# Patient Record
Sex: MALE | Race: WHITE | NOT HISPANIC OR LATINO | Employment: OTHER | URBAN - METROPOLITAN AREA
[De-identification: names, ages, dates, MRNs, and addresses within clinical notes are randomized per-mention and may not be internally consistent; named-entity substitution may affect disease eponyms.]

---

## 2018-02-14 ENCOUNTER — OFFICE VISIT (OUTPATIENT)
Dept: PAIN MEDICINE | Facility: CLINIC | Age: 83
End: 2018-02-14
Payer: MEDICARE

## 2018-02-14 VITALS
BODY MASS INDEX: 29.84 KG/M2 | WEIGHT: 240 LBS | HEART RATE: 89 BPM | SYSTOLIC BLOOD PRESSURE: 136 MMHG | HEIGHT: 75 IN | DIASTOLIC BLOOD PRESSURE: 70 MMHG

## 2018-02-14 DIAGNOSIS — M47.896 OTHER SPONDYLOSIS, LUMBAR REGION: ICD-10-CM

## 2018-02-14 DIAGNOSIS — M51.36 DDD (DEGENERATIVE DISC DISEASE), LUMBAR: Primary | ICD-10-CM

## 2018-02-14 PROCEDURE — 99203 OFFICE O/P NEW LOW 30 MIN: CPT | Mod: PBBFAC,PO | Performed by: ANESTHESIOLOGY

## 2018-02-14 PROCEDURE — 99999 PR PBB SHADOW E&M-NEW PATIENT-LVL III: CPT | Mod: PBBFAC,,, | Performed by: ANESTHESIOLOGY

## 2018-02-14 PROCEDURE — 1159F MED LIST DOCD IN RCRD: CPT | Mod: ,,, | Performed by: ANESTHESIOLOGY

## 2018-02-14 PROCEDURE — 1125F AMNT PAIN NOTED PAIN PRSNT: CPT | Mod: ,,, | Performed by: ANESTHESIOLOGY

## 2018-02-14 PROCEDURE — 99204 OFFICE O/P NEW MOD 45 MIN: CPT | Mod: S$PBB,,, | Performed by: ANESTHESIOLOGY

## 2018-02-14 RX ORDER — SITAGLIPTIN 100 MG/1
100 TABLET, FILM COATED ORAL DAILY
COMMUNITY
Start: 2017-11-28

## 2018-02-14 RX ORDER — HYDROCODONE BITARTRATE AND ACETAMINOPHEN 5; 325 MG/1; MG/1
TABLET ORAL
Status: ON HOLD | COMMUNITY
Start: 2017-12-13 | End: 2018-03-05

## 2018-02-14 RX ORDER — PANTOPRAZOLE SODIUM 40 MG/1
TABLET, DELAYED RELEASE ORAL
COMMUNITY
Start: 2017-12-11 | End: 2019-03-12 | Stop reason: SDUPTHER

## 2018-02-14 RX ORDER — LISINOPRIL 2.5 MG/1
2.5 TABLET ORAL DAILY
COMMUNITY
Start: 2018-01-08 | End: 2019-04-09 | Stop reason: SDUPTHER

## 2018-02-14 RX ORDER — RIVAROXABAN 20 MG/1
TABLET, FILM COATED ORAL
COMMUNITY
Start: 2018-01-17 | End: 2018-04-17 | Stop reason: SDUPTHER

## 2018-02-14 RX ORDER — COLLAGENASE SANTYL 250 [ARB'U]/G
OINTMENT TOPICAL
COMMUNITY
Start: 2017-12-11 | End: 2020-02-26

## 2018-02-14 NOTE — PROGRESS NOTES
This note was completed with dictation software and grammatical errors may exist.    Referring Physician: Navdeep, Philipreferral    PCP: Dave Enriquez MD      CC:  Low back pain    HPI:   Ric Guzmán is a 82 y.o. male with PMHx DM2, clotting disorder on NOAC, HTN, s/p x5 CAIRA for back pain in 2012, SCS placement in 2015 with subsequent removal in 2016 presents with c/o lower back/upper buttock pain.  He states it has been present for many years.  It does not radiate.  It is constant,  It is described as an aching.  Exacerbating factors are standing, alleviating factors are rest.  It is constant.  No bowel or bladder incontinence.  No other associated symptoms. He has not had any lumbar medial branch nerve blocks.        ROS:  CONSTITUTIONAL: No fevers, chills, night sweats, wt. loss, appetite changes  SKIN: no rashes or itching  ENT: No headaches, head trauma, vision changes, or eye pain  LYMPH NODES: None noticed   CV: No chest pain, palpitations.   RESP: No shortness of breath, dyspnea on exertion, cough, wheezing, or hemoptysis  GI: No nausea, emesis, diarrhea, constipation, melena, hematochezia, pain.    : No dysuria, hematuria, urgency, or frequency   HEME: No easy bruising, bleeding problems  PSYCHIATRIC: No depression, anxiety, psychosis, hallucinations.  NEURO: No seizures, memory loss, dizziness or difficulty sleeping  MSK: +HPI      Past Medical History:   Diagnosis Date    ADHD (attention deficit hyperactivity disorder)     Allergy     Arthritis     Cancer     Cataract     Clotting disorder     Diabetes mellitus, type 2     Ulcer      Past Surgical History:   Procedure Laterality Date    CHOLECYSTECTOMY      COLON SURGERY      EYE SURGERY      FRACTURE SURGERY      HERNIA REPAIR      JOINT REPLACEMENT      SPINE SURGERY      TONSILLECTOMY      VASECTOMY       No family history on file.  Social History     Social History    Marital status:      Spouse name: N/A    Number of  "children: N/A    Years of education: N/A     Social History Main Topics    Smoking status: Former Smoker    Smokeless tobacco: Former User    Alcohol use 2.4 oz/week     4 Cans of beer per week    Drug use: No    Sexual activity: No     Other Topics Concern    None     Social History Narrative    None         Medications/Allergies: See med card    Vitals:    02/14/18 1403   BP: 136/70   Pulse: 89   Weight: 108.9 kg (240 lb)   Height: 6' 3" (1.905 m)   PainSc:   8   PainLoc: Back         Physical exam:    GENERAL: A and O x3, the patient appears well groomed and is in no acute distress.  Skin: No rashes or obvious lesions  HEENT: normocephalic, atraumatic  CARDIOVASCULAR:  Palpable peripheral pulses  LUNGS: easy work of breathing  ABDOMEN: soft, nontender   UPPER EXTREMITIES: Normal alignment, normal range of motion, no atrophy, no skin changes,  hair growth and nail growth normal and equal bilaterally. No swelling, no tenderness.    LOWER EXTREMITIES:  Normal alignment, normal range of motion, no atrophy, no skin changes,  hair growth and nail growth normal and equal bilaterally. No swelling, no tenderness.    LUMBAR SPINE  Lumbar spine: ROM is full with flexion extension and oblique extension with no increased pain.  + Mild increase in pain bilaterally with axial loading.  Jalen's test causes no increased pain on either side.    Supine straight leg raise is negative bilaterally.    Internal and external rotation of the hip causes no increased pain on either side.  Myofascial exam: No tenderness to palpation across lumbar paraspinous muscles.      MENTAL STATUS: normal orientation, speech, language, and fund of knowledge for social situation.  Emotional state appropriate.    CRANIAL NERVES:  II:  PERRL bilaterally,   III,IV,VI: EOMI.    V:  Facial sensation equal bilaterally  VII:  Facial motor function normal.  VIII:  Hearing equal to finger rub bilaterally  IX/X: Gag normal, palate symmetric  XI: "  Shoulder shrug equal, head turn equal  XII:  Tongue midline without fasciculations      MOTOR: Tone and bulk: normal bilateral upper and lower Strength: normal   Delt Bi Tri WE WF     R 5 5 5 5 5 5   L 5 5 5 5 5 5     IP ADD ABD Quad TA Gas HAM  R 5 5 5 5 5 5 5  L 5 5 5 5 5 5 5    SENSATION: Light touch and pinprick intact bilaterally  REFLEXES: normal, symmetric, nonbrisk.  Toes down, no clonus. No hoffmans.  GAIT: normal rise, base, steps, and arm swing.        Imaging:  MRI 6.2016  Mild ligamentum flavum hypertrophy noted at L3, L5.  Mild facet arthropathy L4-5.      Assessment:  Patient referred for lower back pain  1. Facet arthropathy, lumbar     2. DDD (degenerative disc disease), lumbar     3. Lumbar spondylosis           Plan:  - I have stressed the importance of physical activity and exercise to improve overall health  - Request records to review  - I believe his low back pain maybe due to facet arthropathy and have recommended lumbar medial branch blocks as a diagnostic procedure.  If successful, would proceed with radiofrequency ablation.  - Follow up after procedure

## 2018-02-19 ENCOUNTER — TELEPHONE (OUTPATIENT)
Dept: PAIN MEDICINE | Facility: CLINIC | Age: 83
End: 2018-02-19

## 2018-02-19 NOTE — TELEPHONE ENCOUNTER
----- Message from Cathleen Carreon sent at 2/19/2018 12:34 PM CST -----  Contact: pt  Pt states that he received a call today thinks from the office and he is returning to see the reason for the call...[phone]

## 2018-02-26 DIAGNOSIS — M48.8X6 OTHER SPECIFIED SPONDYLOPATHIES, LUMBAR REGION: Primary | ICD-10-CM

## 2018-02-27 ENCOUNTER — DOCUMENTATION ONLY (OUTPATIENT)
Dept: PAIN MEDICINE | Facility: CLINIC | Age: 83
End: 2018-02-27

## 2018-02-27 NOTE — PROGRESS NOTES
MRI lumbar spine without contrast 2/2/2018 ([facility])  Impression:  Slight multilevel spondylosis involving slight bilateral foraminal narrowing at L4-5 due to facet arthropathy.  No central canal stenosis.

## 2018-03-05 ENCOUNTER — HOSPITAL ENCOUNTER (OUTPATIENT)
Facility: AMBULARY SURGERY CENTER | Age: 83
Discharge: HOME | End: 2018-03-05
Attending: ANESTHESIOLOGY | Admitting: ANESTHESIOLOGY
Payer: MEDICARE

## 2018-03-05 ENCOUNTER — SURGERY (OUTPATIENT)
Age: 83
End: 2018-03-05

## 2018-03-05 DIAGNOSIS — M47.896 OTHER SPONDYLOSIS, LUMBAR REGION: Primary | ICD-10-CM

## 2018-03-05 LAB — POCT GLUCOSE: 252 MG/DL (ref 70–110)

## 2018-03-05 PROCEDURE — 64494 INJ PARAVERT F JNT L/S 2 LEV: CPT | Mod: 50,,, | Performed by: ANESTHESIOLOGY

## 2018-03-05 PROCEDURE — 64493 INJ PARAVERT F JNT L/S 1 LEV: CPT | Mod: RT | Performed by: ANESTHESIOLOGY

## 2018-03-05 PROCEDURE — 64494 INJ PARAVERT F JNT L/S 2 LEV: CPT | Mod: LT | Performed by: ANESTHESIOLOGY

## 2018-03-05 PROCEDURE — 99152 MOD SED SAME PHYS/QHP 5/>YRS: CPT | Mod: ,,, | Performed by: ANESTHESIOLOGY

## 2018-03-05 PROCEDURE — 64493 INJ PARAVERT F JNT L/S 1 LEV: CPT | Mod: 50,,, | Performed by: ANESTHESIOLOGY

## 2018-03-05 PROCEDURE — 64495 INJ PARAVERT F JNT L/S 3 LEV: CPT | Mod: RT | Performed by: ANESTHESIOLOGY

## 2018-03-05 PROCEDURE — G8907 PT DOC NO EVENTS ON DISCHARG: HCPCS | Performed by: ANESTHESIOLOGY

## 2018-03-05 RX ORDER — BUPIVACAINE HYDROCHLORIDE 5 MG/ML
INJECTION, SOLUTION EPIDURAL; INTRACAUDAL
Status: DISCONTINUED | OUTPATIENT
Start: 2018-03-05 | End: 2018-03-05 | Stop reason: HOSPADM

## 2018-03-05 RX ORDER — BUPIVACAINE HYDROCHLORIDE 5 MG/ML
INJECTION, SOLUTION EPIDURAL; INTRACAUDAL
Status: DISCONTINUED
Start: 2018-03-05 | End: 2018-03-05 | Stop reason: HOSPADM

## 2018-03-05 RX ORDER — SODIUM CHLORIDE, SODIUM LACTATE, POTASSIUM CHLORIDE, CALCIUM CHLORIDE 600; 310; 30; 20 MG/100ML; MG/100ML; MG/100ML; MG/100ML
INJECTION, SOLUTION INTRAVENOUS ONCE AS NEEDED
Status: COMPLETED | OUTPATIENT
Start: 2018-03-05 | End: 2018-03-05

## 2018-03-05 RX ORDER — MIDAZOLAM HYDROCHLORIDE 2 MG/2ML
INJECTION, SOLUTION INTRAMUSCULAR; INTRAVENOUS
Status: DISCONTINUED | OUTPATIENT
Start: 2018-03-05 | End: 2018-03-05 | Stop reason: HOSPADM

## 2018-03-05 RX ORDER — MIDAZOLAM HYDROCHLORIDE 1 MG/ML
INJECTION INTRAMUSCULAR; INTRAVENOUS
Status: DISCONTINUED
Start: 2018-03-05 | End: 2018-03-05 | Stop reason: HOSPADM

## 2018-03-05 RX ADMIN — BUPIVACAINE HYDROCHLORIDE 5 ML: 5 INJECTION, SOLUTION EPIDURAL; INTRACAUDAL at 12:03

## 2018-03-05 RX ADMIN — SODIUM CHLORIDE, SODIUM LACTATE, POTASSIUM CHLORIDE, CALCIUM CHLORIDE: 600; 310; 30; 20 INJECTION, SOLUTION INTRAVENOUS at 12:03

## 2018-03-05 RX ADMIN — MIDAZOLAM HYDROCHLORIDE 2 MG: 2 INJECTION, SOLUTION INTRAMUSCULAR; INTRAVENOUS at 12:03

## 2018-03-05 NOTE — OP NOTE
PROCEDURE DATE: 3/5/2018    PROCEDURE:  Bilateral L3,4,5 medial branch nerve blocks    DIAGNOSIS:  Other lumbar spondylosis    Post Op diagnosis: Same    PHYSICIAN: Joe Matos MD    MEDICATIONS INJECTED: 0.5% bupivicaine, 0.5 ml at each level    SEDATION MEDICATIONS:IV Versed    LOCAL ANESTHETIC USED: None    ESTIMATED BLOOD LOSS:  None    COMPLICATIONS:  None    TECHNIQUE: A time out was taken to identify the patient, procedure and side of the procedure. The patient was placed in a prone position, then prepped and draped in the usual sterile fashion using ChloraPrep and sterile towels.  The levels were determined under fluoroscopic guidance and then marked.  A 25-gauge 3.5 inch needle was introduced to the anatomic location of the L3,4,5 medial branch nerves on the bilateral side. The above medication was then injected. The patient tolerated the procedure well.     The patient was monitored after the procedure. Patient was given pain diary to record pain levels at home.     If found to have greater than a 50% recovery and so will be scheduled for a radiofrequency ablation of the corresponding nerves.  Patient was given post procedure and discharge instructions to follow at home.  The patient was discharged in a stable condition.

## 2018-03-05 NOTE — DISCHARGE SUMMARY
[facility]  Discharge Note  Short Stay    Admit Date: 3/5/2018    Discharge Date and Time: 3/5/2018    Attending Physician: Joe Matos MD     Discharge Provider: Joe Matos    Diagnoses:  Active Hospital Problems    Diagnosis  POA    *Other spondylosis, lumbar region [M47.896]  Yes      Resolved Hospital Problems    Diagnosis Date Resolved POA   No resolved problems to display.       Hospital Course: Lumbar MBB  Discharged Condition: Good    Final Diagnoses:   Active Hospital Problems    Diagnosis  POA    *Other spondylosis, lumbar region [M47.896]  Yes      Resolved Hospital Problems    Diagnosis Date Resolved POA   No resolved problems to display.       Disposition: Home or Self Care    Follow up/Patient Instructions:    Medications:  Reconciled Home Medications:   Current Discharge Medication List      CONTINUE these medications which have NOT CHANGED    Details   lisinopril (PRINIVIL,ZESTRIL) 2.5 MG tablet       JANUVIA 100 mg Tab       pantoprazole (PROTONIX) 40 MG tablet       SANTYL ointment       XARELTO 20 mg Tab          STOP taking these medications       hydrocodone-acetaminophen 5-325mg (NORCO) 5-325 mg per tablet Comments:   Reason for Stopping:               Discharge Procedure Orders  Call MD for:  temperature >100.4     Call MD for:  persistent nausea and vomiting or diarrhea     Call MD for:  severe uncontrolled pain     Call MD for:  redness, tenderness, or signs of infection (pain, swelling, redness, odor or green/yellow discharge around incision site)     Call MD for:  difficulty breathing or increased cough     Call MD for:  severe persistent headache          Follow up with MD in 2-3 weeks    Discharge Procedure Orders (must include Diet, Follow-up, Activity):    Discharge Procedure Orders (must include Diet, Follow-up, Activity)  Call MD for:  temperature >100.4     Call MD for:  persistent nausea and vomiting or diarrhea     Call MD for:  severe uncontrolled pain     Call MD  for:  redness, tenderness, or signs of infection (pain, swelling, redness, odor or green/yellow discharge around incision site)     Call MD for:  difficulty breathing or increased cough     Call MD for:  severe persistent headache

## 2018-03-05 NOTE — PLAN OF CARE
Patient sitting up in chair and states she is ready to go home. Spouse chairside and states she is ready to take patient home; she states she is driving  home. Patient denies nausea, pain or any limb weakness. Patient recorded pain assessment on pain diary; spouse and patient able to teach back all discharge instructions including pain diary and recording and usage and precautions of ice pack(given) for as needed use.

## 2018-03-05 NOTE — DISCHARGE INSTRUCTIONS
Recovery After Procedural Sedation (Adult)  You have been given medicine by vein to make you sleep during your surgery. This may have included both a pain medicine and sleeping medicine. Most of the effects have worn off. But you may still have some drowsiness for the next 6 to 8 hours.  Home care  Follow these guidelines when you get home:  · For the next 8 hours, you should be watched by a responsible adult. This person should make sure your condition is not getting worse.  · Don't drink any alcohol for the next 24 hours.  · Don't drive, operate dangerous machinery, or make important business or personal decisions during the next 24 hours.  Note: Your healthcare provider may tell you not to take any medicine by mouth for pain or sleep in the next 4 hours. These medicines may react with the medicines you were given in the hospital. This could cause a much stronger response than usual.  Follow-up care  Follow up with your healthcare provider if you are not alert and back to your usual level of activity within 12 hours.  When to seek medical advice  Call your healthcare provider right away if any of these occur:  · Drowsiness gets worse  · Weakness or dizziness gets worse  · Repeated vomiting  · You can't be awakened   Date Last Reviewed: 10/18/2016  © 1249-8086 [employer]. [address] [place] [address], [place] [address]. All rights reserved. This information is not intended as a substitute for professional medical care. Always follow your healthcare professional's instructions.      Nerve Block  This was a test, not a treatment. Your pain may return.  Keep your pain journal Dr office will call to check your pain levels within 3 days    Home care instructions  You may apply ice pack to the injection site for 2 days , NO HEAT for 2 days. No hot tubs, swimming pools saunas, or heating pads for 2 days.  You may take a shower but no soaking above level of injection in tub or pool for 2 days  Resume Aspirin,  Plavix, or Coumadin the day after the procedure unless otherwise instructed.  Gradually increase activity  Do not drive for 24 hr  If diabetic monitor your glucose carefully. Follow up with your primary MD if this is a problem    SEEK  IMMEDIATE MEDICAL HELP FOR:  Severe increase in your usual pain or appearance of new pain  Prolonged or increasing weakness or numbness in the legs or arms  Drainage, redness, active bleeding, or increased swelling at the injection site  Temperature over 100.0 degrees F.  Headache that increases when your head is upright and decreases when you lie flat  Shortness of breath, chest pain, or problems breathing

## 2018-03-05 NOTE — H&P (VIEW-ONLY)
This note was completed with dictation software and grammatical errors may exist.    Referring Physician: Navdeep, Philipreferral    PCP: Dave Enriquez MD      CC:  Low back pain    HPI:   Ric Guzmán is a 82 y.o. male with PMHx DM2, clotting disorder on NOAC, HTN, s/p x5 CIARA for back pain in 2012, SCS placement in 2015 with subsequent removal in 2016 presents with c/o lower back/upper buttock pain.  He states it has been present for many years.  It does not radiate.  It is constant,  It is described as an aching.  Exacerbating factors are standing, alleviating factors are rest.  It is constant.  No bowel or bladder incontinence.  No other associated symptoms. He has not had any lumbar medial branch nerve blocks.        ROS:  CONSTITUTIONAL: No fevers, chills, night sweats, wt. loss, appetite changes  SKIN: no rashes or itching  ENT: No headaches, head trauma, vision changes, or eye pain  LYMPH NODES: None noticed   CV: No chest pain, palpitations.   RESP: No shortness of breath, dyspnea on exertion, cough, wheezing, or hemoptysis  GI: No nausea, emesis, diarrhea, constipation, melena, hematochezia, pain.    : No dysuria, hematuria, urgency, or frequency   HEME: No easy bruising, bleeding problems  PSYCHIATRIC: No depression, anxiety, psychosis, hallucinations.  NEURO: No seizures, memory loss, dizziness or difficulty sleeping  MSK: +HPI      Past Medical History:   Diagnosis Date    ADHD (attention deficit hyperactivity disorder)     Allergy     Arthritis     Cancer     Cataract     Clotting disorder     Diabetes mellitus, type 2     Ulcer      Past Surgical History:   Procedure Laterality Date    CHOLECYSTECTOMY      COLON SURGERY      EYE SURGERY      FRACTURE SURGERY      HERNIA REPAIR      JOINT REPLACEMENT      SPINE SURGERY      TONSILLECTOMY      VASECTOMY       No family history on file.  Social History     Social History    Marital status:      Spouse name: N/A    Number of  "children: N/A    Years of education: N/A     Social History Main Topics    Smoking status: Former Smoker    Smokeless tobacco: Former User    Alcohol use 2.4 oz/week     4 Cans of beer per week    Drug use: No    Sexual activity: No     Other Topics Concern    None     Social History Narrative    None         Medications/Allergies: See med card    Vitals:    02/14/18 1403   BP: 136/70   Pulse: 89   Weight: 108.9 kg (240 lb)   Height: 6' 3" (1.905 m)   PainSc:   8   PainLoc: Back         Physical exam:    GENERAL: A and O x3, the patient appears well groomed and is in no acute distress.  Skin: No rashes or obvious lesions  HEENT: normocephalic, atraumatic  CARDIOVASCULAR:  Palpable peripheral pulses  LUNGS: easy work of breathing  ABDOMEN: soft, nontender   UPPER EXTREMITIES: Normal alignment, normal range of motion, no atrophy, no skin changes,  hair growth and nail growth normal and equal bilaterally. No swelling, no tenderness.    LOWER EXTREMITIES:  Normal alignment, normal range of motion, no atrophy, no skin changes,  hair growth and nail growth normal and equal bilaterally. No swelling, no tenderness.    LUMBAR SPINE  Lumbar spine: ROM is full with flexion extension and oblique extension with no increased pain.  + Mild increase in pain bilaterally with axial loading.  Jalen's test causes no increased pain on either side.    Supine straight leg raise is negative bilaterally.    Internal and external rotation of the hip causes no increased pain on either side.  Myofascial exam: No tenderness to palpation across lumbar paraspinous muscles.      MENTAL STATUS: normal orientation, speech, language, and fund of knowledge for social situation.  Emotional state appropriate.    CRANIAL NERVES:  II:  PERRL bilaterally,   III,IV,VI: EOMI.    V:  Facial sensation equal bilaterally  VII:  Facial motor function normal.  VIII:  Hearing equal to finger rub bilaterally  IX/X: Gag normal, palate symmetric  XI: "  Shoulder shrug equal, head turn equal  XII:  Tongue midline without fasciculations      MOTOR: Tone and bulk: normal bilateral upper and lower Strength: normal   Delt Bi Tri WE WF     R 5 5 5 5 5 5   L 5 5 5 5 5 5     IP ADD ABD Quad TA Gas HAM  R 5 5 5 5 5 5 5  L 5 5 5 5 5 5 5    SENSATION: Light touch and pinprick intact bilaterally  REFLEXES: normal, symmetric, nonbrisk.  Toes down, no clonus. No hoffmans.  GAIT: normal rise, base, steps, and arm swing.        Imaging:  MRI 6.2016  Mild ligamentum flavum hypertrophy noted at L3, L5.  Mild facet arthropathy L4-5.      Assessment:  Patient referred for lower back pain  1. Facet arthropathy, lumbar     2. DDD (degenerative disc disease), lumbar     3. Lumbar spondylosis           Plan:  - I have stressed the importance of physical activity and exercise to improve overall health  - Request records to review  - I believe his low back pain maybe due to facet arthropathy and have recommended lumbar medial branch blocks as a diagnostic procedure.  If successful, would proceed with radiofrequency ablation.  - Follow up after procedure

## 2018-03-06 VITALS
DIASTOLIC BLOOD PRESSURE: 76 MMHG | SYSTOLIC BLOOD PRESSURE: 165 MMHG | HEART RATE: 79 BPM | BODY MASS INDEX: 29.85 KG/M2 | RESPIRATION RATE: 18 BRPM | OXYGEN SATURATION: 97 % | WEIGHT: 240.06 LBS | HEIGHT: 75 IN | TEMPERATURE: 98 F

## 2018-03-08 ENCOUNTER — TELEPHONE (OUTPATIENT)
Dept: PAIN MEDICINE | Facility: CLINIC | Age: 83
End: 2018-03-08

## 2018-03-09 ENCOUNTER — OFFICE VISIT (OUTPATIENT)
Dept: PAIN MEDICINE | Facility: CLINIC | Age: 83
End: 2018-03-09
Payer: MEDICARE

## 2018-03-09 VITALS
HEIGHT: 75 IN | BODY MASS INDEX: 29.84 KG/M2 | DIASTOLIC BLOOD PRESSURE: 79 MMHG | WEIGHT: 240 LBS | SYSTOLIC BLOOD PRESSURE: 166 MMHG | HEART RATE: 86 BPM

## 2018-03-09 DIAGNOSIS — M51.36 DDD (DEGENERATIVE DISC DISEASE), LUMBAR: ICD-10-CM

## 2018-03-09 DIAGNOSIS — M47.819 FACET ARTHROPATHY: Primary | ICD-10-CM

## 2018-03-09 PROCEDURE — 99213 OFFICE O/P EST LOW 20 MIN: CPT | Mod: PBBFAC,PO | Performed by: PHYSICIAN ASSISTANT

## 2018-03-09 PROCEDURE — 99214 OFFICE O/P EST MOD 30 MIN: CPT | Mod: S$PBB,,, | Performed by: PHYSICIAN ASSISTANT

## 2018-03-09 PROCEDURE — 99999 PR PBB SHADOW E&M-EST. PATIENT-LVL III: CPT | Mod: PBBFAC,,, | Performed by: PHYSICIAN ASSISTANT

## 2018-03-09 NOTE — PROGRESS NOTES
Referring Physician: No ref. provider found    PCP: Dave Enriquez MD      CC:  Low back pain    Interval History:  Mr. Guzmán is a 82 y.o. male with chornic low back pain who presents today with low back pain that extends into upper buttock. Pain does not radiate. It is worse with walking. He is s/p lumbar MBB at L3, 4, 5 bilaterally that provided 50% relief of his pain. He says he was able to walk twice as far and his friends noticed he was standing straighter and walking better. He lives in [place]. 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  Pain today is rated 8/10.  Pt has been seen in the clinic before, however pt is new to me.     History below per Dr. Matos    HPI:   Ric Guzmán is a 82 y.o. male with PMHx DM2, clotting disorder on NOAC, HTN, s/p x5 CIARA for back pain in 2012, SCS placement in 2015 with subsequent removal in 2016 presents with c/o lower back/upper buttock pain.  He states it has been present for many years.  It does not radiate.  It is constant,  It is described as an aching.  Exacerbating factors are standing, alleviating factors are rest.  It is constant.  No bowel or bladder incontinence.  No other associated symptoms. He has not had any lumbar medial branch nerve blocks.      Interventional History:  Lumbar MBB at L3, ,4,5 bilaterally 50% relief 3/5/18    ROS:  CONSTITUTIONAL: No fevers, chills, night sweats, wt. loss, appetite changes  SKIN: no rashes or itching  ENT: No headaches, head trauma, vision changes, or eye pain  LYMPH NODES: None noticed   CV: No chest pain, palpitations.   RESP: No shortness of breath, dyspnea on exertion, cough, wheezing, or hemoptysis  GI: No nausea, emesis, diarrhea, constipation, melena, hematochezia, pain.    : No dysuria, hematuria, urgency, or frequency   HEME: No easy bruising, bleeding problems  PSYCHIATRIC: No depression, anxiety, psychosis, hallucinations.  NEURO: No seizures, memory loss, dizziness or difficulty  "sleeping  MSK: +HPI      Past Medical History:   Diagnosis Date    ADHD (attention deficit hyperactivity disorder)     Allergy     Arthritis     Cancer     Cataract     Clotting disorder     Diabetes mellitus, type 2     Ulcer      Past Surgical History:   Procedure Laterality Date    CHOLECYSTECTOMY      COLON SURGERY      EYE SURGERY      FRACTURE SURGERY      HERNIA REPAIR      JOINT REPLACEMENT      SPINE SURGERY      TONSILLECTOMY      VASECTOMY       History reviewed. No pertinent family history.  Social History     Social History    Marital status:      Spouse name: N/A    Number of children: N/A    Years of education: N/A     Social History Main Topics    Smoking status: Former Smoker    Smokeless tobacco: Former User    Alcohol use 2.4 oz/week     4 Cans of beer per week    Drug use: No    Sexual activity: No     Other Topics Concern    None     Social History Narrative    None         Medications/Allergies: See med card    Vitals:    03/09/18 0901   BP: (!) 166/79   Pulse: 86   Weight: 108.9 kg (240 lb)   Height: 6' 3" (1.905 m)   PainSc:   8   PainLoc: Back         Physical exam:    GENERAL: A and O x3, the patient appears well groomed and is in no acute distress.  Skin: No rashes or obvious lesions  HEENT: normocephalic, atraumatic  CARDIOVASCULAR:  RRR  LUNGS: non labored breathing  ABDOMEN: soft, nontender   UPPER EXTREMITIES: Normal alignment, normal range of motion, no atrophy, no skin changes,  hair growth and nail growth normal and equal bilaterally. No swelling, no tenderness.    LOWER EXTREMITIES:  Normal alignment, normal range of motion, no atrophy, no skin changes,  hair growth and nail growth normal and equal bilaterally. No swelling, no tenderness.    LUMBAR SPINE  Lumbar spine: ROM is full with flexion extension and oblique extension with no increased pain.  + Mild increase in pain bilaterally with axial loading.  Jalen's test causes no increased pain on " either side.    Supine straight leg raise is negative bilaterally.    Internal and external rotation of the hip causes no increased pain on either side.  Myofascial exam: No tenderness to palpation across lumbar paraspinous muscles.      MENTAL STATUS: normal orientation, speech, language, and fund of knowledge for social situation.  Emotional state appropriate.    CRANIAL NERVES:  II:  PERRL bilaterally,   III,IV,VI: EOMI.    V:  Facial sensation equal bilaterally  VII:  Facial motor function normal.  VIII:  Hearing equal to finger rub bilaterally  IX/X: Gag normal, palate symmetric  XI:  Shoulder shrug equal, head turn equal  XII:  Tongue midline without fasciculations      MOTOR: Tone and bulk: normal bilateral upper and lower Strength: normal   Delt Bi Tri WE WF     R 5 5 5 5 5 5   L 5 5 5 5 5 5     IP ADD ABD Quad TA Gas HAM  R 5 5 5 5 5 5 5  L 5 5 5 5 5 5 5    SENSATION: Light touch and pinprick intact bilaterally  REFLEXES: normal, symmetric, nonbrisk.  Toes down, no clonus. No hoffmans.  GAIT: normal rise, base, steps, and arm swing.        Imaging:  MRI 6.2016  Mild ligamentum flavum hypertrophy noted at L3, L5.  Mild facet arthropathy L4-5.      Assessment:  Mr. Bansal is a 82 y.o. male with back pain  1. Facet arthropathy     2. DDD (degenerative disc disease), lumbar       Plan:  1.  I have stressed the importance of physical activity and exercise to improve overall health  2. Schedule lumbar MB RFA at L3, ,4, 5 bilaterally. I have explained the risks, benefits, and alternatives of the procedure in detail. The patient voices understanding and all questions have been answered. The patient agrees to proceed as planned. Written Consent obtained.   3. Recommend formal PT once he returns home  4. F/u s/p RFA

## 2018-03-09 NOTE — PROGRESS NOTES
This note was completed with dictation software and grammatical errors may exist.    Referring Physician: No ref. provider found    PCP: Dave Enriquez MD      CC:  Low back pain    HPI:   Ric Guzmán is a 82 y.o. male with PMHx DM2, clotting disorder on NOAC, HTN, s/p x5 CIARA for back pain in 2012, SCS placement in 2015 with subsequent removal in 2016 presents with c/o lower back/upper buttock pain.  He states it has been present for many years.  It does not radiate.  It is constant,  It is described as an aching.  Exacerbating factors are standing, alleviating factors are rest.  It is constant.  No bowel or bladder incontinence.  No other associated symptoms. He has not had any lumbar medial branch nerve blocks.        ROS:  CONSTITUTIONAL: No fevers, chills, night sweats, wt. loss, appetite changes  SKIN: no rashes or itching  ENT: No headaches, head trauma, vision changes, or eye pain  LYMPH NODES: None noticed   CV: No chest pain, palpitations.   RESP: No shortness of breath, dyspnea on exertion, cough, wheezing, or hemoptysis  GI: No nausea, emesis, diarrhea, constipation, melena, hematochezia, pain.    : No dysuria, hematuria, urgency, or frequency   HEME: No easy bruising, bleeding problems  PSYCHIATRIC: No depression, anxiety, psychosis, hallucinations.  NEURO: No seizures, memory loss, dizziness or difficulty sleeping  MSK: +HPI      Past Medical History:   Diagnosis Date    ADHD (attention deficit hyperactivity disorder)     Allergy     Arthritis     Cancer     Cataract     Clotting disorder     Diabetes mellitus, type 2     Ulcer      Past Surgical History:   Procedure Laterality Date    CHOLECYSTECTOMY      COLON SURGERY      EYE SURGERY      FRACTURE SURGERY      HERNIA REPAIR      JOINT REPLACEMENT      SPINE SURGERY      TONSILLECTOMY      VASECTOMY       History reviewed. No pertinent family history.  Social History     Social History    Marital status:      Spouse  "name: N/A    Number of children: N/A    Years of education: N/A     Social History Main Topics    Smoking status: Former Smoker    Smokeless tobacco: Former User    Alcohol use 2.4 oz/week     4 Cans of beer per week    Drug use: No    Sexual activity: No     Other Topics Concern    None     Social History Narrative    None         Medications/Allergies: See med card    Vitals:    03/09/18 0901   BP: (!) 166/79   Pulse: 86   Weight: 108.9 kg (240 lb)   Height: 6' 3" (1.905 m)   PainSc:   8   PainLoc: Back         Physical exam:    GENERAL: A and O x3, the patient appears well groomed and is in no acute distress.  Skin: No rashes or obvious lesions  HEENT: normocephalic, atraumatic  CARDIOVASCULAR:  Palpable peripheral pulses  LUNGS: easy work of breathing  ABDOMEN: soft, nontender   UPPER EXTREMITIES: Normal alignment, normal range of motion, no atrophy, no skin changes,  hair growth and nail growth normal and equal bilaterally. No swelling, no tenderness.    LOWER EXTREMITIES:  Normal alignment, normal range of motion, no atrophy, no skin changes,  hair growth and nail growth normal and equal bilaterally. No swelling, no tenderness.    LUMBAR SPINE  Lumbar spine: ROM is full with flexion extension and oblique extension with no increased pain.  + Mild increase in pain bilaterally with axial loading.  Jalen's test causes no increased pain on either side.    Supine straight leg raise is negative bilaterally.    Internal and external rotation of the hip causes no increased pain on either side.  Myofascial exam: No tenderness to palpation across lumbar paraspinous muscles.      MENTAL STATUS: normal orientation, speech, language, and fund of knowledge for social situation.  Emotional state appropriate.    CRANIAL NERVES:  II:  PERRL bilaterally,   III,IV,VI: EOMI.    V:  Facial sensation equal bilaterally  VII:  Facial motor function normal.  VIII:  Hearing equal to finger rub bilaterally  IX/X: Gag normal, " palate symmetric  XI:  Shoulder shrug equal, head turn equal  XII:  Tongue midline without fasciculations      MOTOR: Tone and bulk: normal bilateral upper and lower Strength: normal   Delt Bi Tri WE WF     R 5 5 5 5 5 5   L 5 5 5 5 5 5     IP ADD ABD Quad TA Gas HAM  R 5 5 5 5 5 5 5  L 5 5 5 5 5 5 5    SENSATION: Light touch and pinprick intact bilaterally  REFLEXES: normal, symmetric, nonbrisk.  Toes down, no clonus. No hoffmans.  GAIT: normal rise, base, steps, and arm swing.        Imaging:  MRI 6.2016  Mild ligamentum flavum hypertrophy noted at L3, L5.  Mild facet arthropathy L4-5.      Assessment:  Patient referred for lower back pain  No diagnosis found.      Plan:  - I have stressed the importance of physical activity and exercise to improve overall health  - Request records to review  - I believe his low back pain maybe due to facet arthropathy and have recommended lumbar medial branch blocks as a diagnostic procedure.  If successful, would proceed with radiofrequency ablation.  - Follow up after procedure

## 2018-03-14 DIAGNOSIS — M48.8X6 OTHER SPECIFIED SPONDYLOPATHIES, LUMBAR REGION: Primary | ICD-10-CM

## 2018-03-22 ENCOUNTER — HOSPITAL ENCOUNTER (OUTPATIENT)
Facility: AMBULARY SURGERY CENTER | Age: 83
Discharge: HOME | End: 2018-03-22
Attending: ANESTHESIOLOGY | Admitting: ANESTHESIOLOGY
Payer: MEDICARE

## 2018-03-22 ENCOUNTER — SURGERY (OUTPATIENT)
Age: 83
End: 2018-03-22

## 2018-03-22 DIAGNOSIS — M47.896 OTHER SPONDYLOSIS, LUMBAR REGION: Primary | ICD-10-CM

## 2018-03-22 LAB — POCT GLUCOSE: 230 MG/DL (ref 70–110)

## 2018-03-22 PROCEDURE — 99152 MOD SED SAME PHYS/QHP 5/>YRS: CPT | Mod: ,,, | Performed by: ANESTHESIOLOGY

## 2018-03-22 PROCEDURE — 64635 DESTROY LUMB/SAC FACET JNT: CPT | Mod: RT | Performed by: ANESTHESIOLOGY

## 2018-03-22 PROCEDURE — 64635 DESTROY LUMB/SAC FACET JNT: CPT | Mod: 50,,, | Performed by: ANESTHESIOLOGY

## 2018-03-22 PROCEDURE — G8907 PT DOC NO EVENTS ON DISCHARG: HCPCS | Performed by: ANESTHESIOLOGY

## 2018-03-22 PROCEDURE — 64636 DESTROY L/S FACET JNT ADDL: CPT | Mod: 50,,, | Performed by: ANESTHESIOLOGY

## 2018-03-22 PROCEDURE — 64636 DESTROY L/S FACET JNT ADDL: CPT | Mod: LT | Performed by: ANESTHESIOLOGY

## 2018-03-22 RX ORDER — LIDOCAINE HYDROCHLORIDE 10 MG/ML
INJECTION, SOLUTION EPIDURAL; INFILTRATION; INTRACAUDAL; PERINEURAL
Status: DISCONTINUED
Start: 2018-03-22 | End: 2018-03-22 | Stop reason: HOSPADM

## 2018-03-22 RX ORDER — FENTANYL CITRATE 50 UG/ML
INJECTION, SOLUTION INTRAMUSCULAR; INTRAVENOUS
Status: DISCONTINUED
Start: 2018-03-22 | End: 2018-03-22 | Stop reason: HOSPADM

## 2018-03-22 RX ORDER — LIDOCAINE HYDROCHLORIDE 20 MG/ML
INJECTION, SOLUTION EPIDURAL; INFILTRATION; INTRACAUDAL; PERINEURAL
Status: DISCONTINUED | OUTPATIENT
Start: 2018-03-22 | End: 2018-03-22 | Stop reason: HOSPADM

## 2018-03-22 RX ORDER — SODIUM CHLORIDE, SODIUM LACTATE, POTASSIUM CHLORIDE, CALCIUM CHLORIDE 600; 310; 30; 20 MG/100ML; MG/100ML; MG/100ML; MG/100ML
INJECTION, SOLUTION INTRAVENOUS ONCE AS NEEDED
Status: COMPLETED | OUTPATIENT
Start: 2018-03-22 | End: 2018-03-22

## 2018-03-22 RX ORDER — METHYLPREDNISOLONE ACETATE 80 MG/ML
INJECTION, SUSPENSION INTRA-ARTICULAR; INTRALESIONAL; INTRAMUSCULAR; SOFT TISSUE
Status: DISCONTINUED
Start: 2018-03-22 | End: 2018-03-22 | Stop reason: HOSPADM

## 2018-03-22 RX ORDER — METHYLPREDNISOLONE ACETATE 80 MG/ML
INJECTION, SUSPENSION INTRA-ARTICULAR; INTRALESIONAL; INTRAMUSCULAR; SOFT TISSUE
Status: DISCONTINUED | OUTPATIENT
Start: 2018-03-22 | End: 2018-03-22 | Stop reason: HOSPADM

## 2018-03-22 RX ORDER — BUPIVACAINE HYDROCHLORIDE 2.5 MG/ML
INJECTION, SOLUTION EPIDURAL; INFILTRATION; INTRACAUDAL
Status: DISCONTINUED | OUTPATIENT
Start: 2018-03-22 | End: 2018-03-22 | Stop reason: HOSPADM

## 2018-03-22 RX ORDER — LIDOCAINE HYDROCHLORIDE 20 MG/ML
INJECTION, SOLUTION EPIDURAL; INFILTRATION; INTRACAUDAL; PERINEURAL
Status: DISCONTINUED
Start: 2018-03-22 | End: 2018-03-22 | Stop reason: HOSPADM

## 2018-03-22 RX ORDER — FENTANYL CITRATE 50 UG/ML
INJECTION, SOLUTION INTRAMUSCULAR; INTRAVENOUS
Status: DISCONTINUED | OUTPATIENT
Start: 2018-03-22 | End: 2018-03-22 | Stop reason: HOSPADM

## 2018-03-22 RX ORDER — MIDAZOLAM HYDROCHLORIDE 2 MG/2ML
INJECTION, SOLUTION INTRAMUSCULAR; INTRAVENOUS
Status: DISCONTINUED | OUTPATIENT
Start: 2018-03-22 | End: 2018-03-22 | Stop reason: HOSPADM

## 2018-03-22 RX ORDER — BUPIVACAINE HYDROCHLORIDE 2.5 MG/ML
INJECTION, SOLUTION EPIDURAL; INFILTRATION; INTRACAUDAL
Status: DISCONTINUED
Start: 2018-03-22 | End: 2018-03-22 | Stop reason: HOSPADM

## 2018-03-22 RX ORDER — MIDAZOLAM HYDROCHLORIDE 1 MG/ML
INJECTION INTRAMUSCULAR; INTRAVENOUS
Status: DISCONTINUED
Start: 2018-03-22 | End: 2018-03-22 | Stop reason: HOSPADM

## 2018-03-22 RX ORDER — LIDOCAINE HYDROCHLORIDE 10 MG/ML
INJECTION, SOLUTION EPIDURAL; INFILTRATION; INTRACAUDAL; PERINEURAL
Status: DISCONTINUED | OUTPATIENT
Start: 2018-03-22 | End: 2018-03-22 | Stop reason: HOSPADM

## 2018-03-22 RX ADMIN — SODIUM CHLORIDE, SODIUM LACTATE, POTASSIUM CHLORIDE, CALCIUM CHLORIDE: 600; 310; 30; 20 INJECTION, SOLUTION INTRAVENOUS at 12:03

## 2018-03-22 RX ADMIN — FENTANYL CITRATE 50 MCG: 50 INJECTION, SOLUTION INTRAMUSCULAR; INTRAVENOUS at 12:03

## 2018-03-22 RX ADMIN — METHYLPREDNISOLONE ACETATE 80 MG: 80 INJECTION, SUSPENSION INTRA-ARTICULAR; INTRALESIONAL; INTRAMUSCULAR; SOFT TISSUE at 12:03

## 2018-03-22 RX ADMIN — MIDAZOLAM HYDROCHLORIDE 2 MG: 2 INJECTION, SOLUTION INTRAMUSCULAR; INTRAVENOUS at 12:03

## 2018-03-22 RX ADMIN — BUPIVACAINE HYDROCHLORIDE 6 ML: 2.5 INJECTION, SOLUTION EPIDURAL; INFILTRATION; INTRACAUDAL at 12:03

## 2018-03-22 RX ADMIN — LIDOCAINE HYDROCHLORIDE 10 ML: 10 INJECTION, SOLUTION EPIDURAL; INFILTRATION; INTRACAUDAL; PERINEURAL at 12:03

## 2018-03-22 RX ADMIN — LIDOCAINE HYDROCHLORIDE 6 ML: 20 INJECTION, SOLUTION EPIDURAL; INFILTRATION; INTRACAUDAL; PERINEURAL at 12:03

## 2018-03-22 NOTE — OP NOTE
PROCEDURE DATE: 3/22/2018    PROCEDURE:  Radiofrequency ablation of the L3,4,5 medial branch nerves on the bilateral-side utilizing fluoroscopy    DIAGNOSIS:  Other lumbar spondylosis  Post op Diagnosis: Same    PHYSICIAN: Joe Matos MD    MEDICATIONS INJECTED:  From a mixture of 6ml of 0.25% bupivicaine and 80mg of methylprednisone,  1ml of this solution was injected at each level.    LOCAL ANESTHETIC USED: Lidocaine 1%, 2 ml given at each site.    SEDATION MEDICATIONS: RN IV sedation    ESTIMATED BLOOD LOSS:  None    COMPLICATIONS:  None    TECHNIQUE:  A time out was taken to identify patient and procedure side prior to starting the procedure. Laying in a prone position, the patient was prepped and draped in the usual sterile fashion using ChloraPrep and sterile towels.  The levels were determined under fluoroscopic guidance and then marked.  Local anesthetic was given by raising a wheal at the skin over each site and then infiltrated approximately 2cm deeper.  A 20-gauge  100 mm RF needle was introduced to the anatomic location of the bilateral L3,4,5 medial branch nerves.  Motor stimulation up to 2 Volts at each level confirmed no motor nerve involvement.  Impedance was less than 800 ohms at each level.  1ml of 2% lidocaine was instilled prior to lesioning.  Ablation was performed per level utilizing radiofrequency generator 80°C for 60 seconds.  Needles were then rotated 90° and a second lesion was performed.  The above noted medication was then injected slowly. The patient tolerated the procedure well.     The patient was monitored after the procedure.  Patient was given post procedure and discharge instructions to follow at home.  The patient was discharged in a stable condition

## 2018-03-22 NOTE — DISCHARGE INSTRUCTIONS
Recovery After Procedural Sedation (Adult)  You have been given medicine by vein to make you sleep during your surgery. This may have included both a pain medicine and sleeping medicine. Most of the effects have worn off. But you may still have some drowsiness for the next 6 to 8 hours.  Home care  Follow these guidelines when you get home:  · For the next 8 hours, you should be watched by a responsible adult. This person should make sure your condition is not getting worse.  · Don't drink any alcohol for the next 24 hours.  · Don't drive, operate dangerous machinery, or make important business or personal decisions during the next 24 hours.  Note: Your healthcare provider may tell you not to take any medicine by mouth for pain or sleep in the next 4 hours. These medicines may react with the medicines you were given in the hospital. This could cause a much stronger response than usual.  Follow-up care  Follow up with your healthcare provider if you are not alert and back to your usual level of activity within 12 hours.  When to seek medical advice  Call your healthcare provider right away if any of these occur:  · Drowsiness gets worse  · Weakness or dizziness gets worse  · Repeated vomiting  · You can't be awakened   Date Last Reviewed: 10/18/2016  © 3785-3424 [employer]. [address] [place] [address], [place] [address]. All rights reserved. This information is not intended as a substitute for professional medical care. Always follow your healthcare professional's instructions.      RADIOFREQUENCY/Pain injection instructions:     Steroids take about a 2 weeks to relieve pain.  Initially you may get pain relief from the local anesthetic but this may wear off before the steroid works.    No driving for 24 hrs   Activity as tolerated- gradually increase activities.  Dont lift over 10 lbs for 24 hrs   No heat at injection sites x 2 days. No hot tubs, swimming, saunas, or heating pads for 2 days.  Use ice pack  for mild swelling and for comfort at 20 minute intervals, 20 minutes on 20 minutes off. No direct contact of ice itself with skin.  May shower today. No tub baths for two days.      Resume Aspirin, Plavix, or Coumadin the day after the procedure unless otherwise instructed.   If diabetic,monitor your glucose carefully as steroids can increase glucose level    Seek immediate medical help for:   Severe increase in your usual pain or appearance of new pain.  Prolonged (mor than 8 hours) or increasing weakness or numbness in the legs or arms.    - Numbing medicine was injected that affects nerves that carry information from     the  muscles to brain and the brain to the muscles.  This numbness can last 6-8 hrs so be very careful and get assistance when standing or  walking.    Fever above 101 ,Drainage,redness,active bleeding, or increased swelling at the injection site.  Headache, shortness of breath, chest pain, or breathing problems.

## 2018-03-22 NOTE — PLAN OF CARE
Patient sitting in chair and denies pain, nausea or any weakness. Patient states he is ready to go home. Spouse chairside and states she is ready to take patient home; spouse states she is driving the patient home.

## 2018-03-22 NOTE — DISCHARGE SUMMARY
[facility]  Discharge Note  Short Stay    Admit Date: 3/22/2018    Discharge Date and Time: 3/22/2018    Attending Physician: Joe Matos MD     Discharge Provider: Joe Matos    Diagnoses:  Active Hospital Problems    Diagnosis  POA    *Other spondylosis, lumbar region [M47.896]  Yes      Resolved Hospital Problems    Diagnosis Date Resolved POA   No resolved problems to display.       Hospital Course: Lumbar MB RFA  Discharged Condition: Good    Final Diagnoses:   Active Hospital Problems    Diagnosis  POA    *Other spondylosis, lumbar region [M47.896]  Yes      Resolved Hospital Problems    Diagnosis Date Resolved POA   No resolved problems to display.       Disposition: Home or Self Care    Follow up/Patient Instructions:    Medications:  Reconciled Home Medications:   Current Discharge Medication List      CONTINUE these medications which have NOT CHANGED    Details   lisinopril (PRINIVIL,ZESTRIL) 2.5 MG tablet       JANUVIA 100 mg Tab       pantoprazole (PROTONIX) 40 MG tablet       SANTYL ointment       XARELTO 20 mg Tab              Discharge Procedure Orders  Call MD for:  temperature >100.4     Call MD for:  persistent nausea and vomiting or diarrhea     Call MD for:  severe uncontrolled pain     Call MD for:  redness, tenderness, or signs of infection (pain, swelling, redness, odor or green/yellow discharge around incision site)     Call MD for:  difficulty breathing or increased cough     Call MD for:  severe persistent headache          Follow up with MD in 2-3 weeks    Discharge Procedure Orders (must include Diet, Follow-up, Activity):    Discharge Procedure Orders (must include Diet, Follow-up, Activity)  Call MD for:  temperature >100.4     Call MD for:  persistent nausea and vomiting or diarrhea     Call MD for:  severe uncontrolled pain     Call MD for:  redness, tenderness, or signs of infection (pain, swelling, redness, odor or green/yellow discharge around incision site)     Call MD  for:  difficulty breathing or increased cough     Call MD for:  severe persistent headache

## 2018-03-22 NOTE — INTERVAL H&P NOTE
The patient has been examined and the H&P has been reviewed:    I concur with the findings and no changes have occurred since H&P was written.   This patient has been cleared for surgery in an ambulatory surgical facility    ASA 3,  Mallampatti Score 3  No history of anesthetic complications  Plan for RN IV sedation      Anesthesia/Surgery risks, benefits and alternative options discussed and understood by patient/family.          Active Hospital Problems    Diagnosis  POA    Other spondylosis, lumbar region [M47.896]  Yes      Resolved Hospital Problems    Diagnosis Date Resolved POA   No resolved problems to display.

## 2018-03-23 VITALS
HEIGHT: 75 IN | WEIGHT: 240 LBS | BODY MASS INDEX: 29.84 KG/M2 | SYSTOLIC BLOOD PRESSURE: 199 MMHG | TEMPERATURE: 98 F | OXYGEN SATURATION: 98 % | DIASTOLIC BLOOD PRESSURE: 84 MMHG | HEART RATE: 72 BPM | RESPIRATION RATE: 18 BRPM

## 2018-04-04 ENCOUNTER — OFFICE VISIT (OUTPATIENT)
Dept: PAIN MEDICINE | Facility: CLINIC | Age: 83
End: 2018-04-04
Payer: MEDICARE

## 2018-04-04 VITALS
HEIGHT: 75 IN | DIASTOLIC BLOOD PRESSURE: 75 MMHG | SYSTOLIC BLOOD PRESSURE: 150 MMHG | HEART RATE: 83 BPM | BODY MASS INDEX: 29.84 KG/M2 | WEIGHT: 240 LBS

## 2018-04-04 DIAGNOSIS — M47.819 FACET ARTHROPATHY: ICD-10-CM

## 2018-04-04 DIAGNOSIS — M51.36 DDD (DEGENERATIVE DISC DISEASE), LUMBAR: Primary | ICD-10-CM

## 2018-04-04 PROCEDURE — 99213 OFFICE O/P EST LOW 20 MIN: CPT | Mod: S$PBB,,, | Performed by: PHYSICIAN ASSISTANT

## 2018-04-04 PROCEDURE — 99999 PR PBB SHADOW E&M-EST. PATIENT-LVL III: CPT | Mod: PBBFAC,,, | Performed by: PHYSICIAN ASSISTANT

## 2018-04-04 PROCEDURE — 99213 OFFICE O/P EST LOW 20 MIN: CPT | Mod: PBBFAC,PN | Performed by: PHYSICIAN ASSISTANT

## 2018-04-04 NOTE — PROGRESS NOTES
Referring Physician: No ref. provider found    PCP: Dave Enriquez MD      CC:  Low back pain    Interval History:  Mr. Guzmán is a 82 y.o. male with chornic low back pain who presents today with low back pain that extends into upper buttock. Pain does not radiate. It is worse with walking. He is s/p lumbar MB RFA at L3, 4, 5 bilaterally that provided 80% relief of his pain. He c/o bilateral buttock and posterior thigh pain with walking >200 steps. Pain resolves with rest. He has had several epidurals in the past with minimal benefit. He lives in [place]. 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  Pain today is rated 8/10.    HPI:   Ric Guzmán is a 82 y.o. male with PMHx DM2, clotting disorder on NOAC, HTN, s/p x5 CIARA for back pain in 2012, SCS placement in 2015 with subsequent removal in 2016 presents with c/o lower back/upper buttock pain.  He states it has been present for many years.  It does not radiate.  It is constant,  It is described as an aching.  Exacerbating factors are standing, alleviating factors are rest.  It is constant.  No bowel or bladder incontinence.  No other associated symptoms. He has not had any lumbar medial branch nerve blocks.      Interventional History:  Lumbar MBB at L3, ,4,5 bilaterally 50% relief 3/5/18  Lumbar MB RFA at L3, ,4,5 bilaterally 80% relief of back pain 3/22/18    ROS:  CONSTITUTIONAL: No fevers, chills, night sweats, wt. loss, appetite changes  SKIN: no rashes or itching  ENT: No headaches, head trauma, vision changes, or eye pain  LYMPH NODES: None noticed   CV: No chest pain, palpitations.   RESP: No shortness of breath, dyspnea on exertion, cough, wheezing, or hemoptysis  GI: No nausea, emesis, diarrhea, constipation, melena, hematochezia, pain.    : No dysuria, hematuria, urgency, or frequency   HEME: No easy bruising, bleeding problems  PSYCHIATRIC: No depression, anxiety, psychosis, hallucinations.  NEURO: No seizures, memory loss,  "dizziness or difficulty sleeping  MSK: +HPI      Past Medical History:   Diagnosis Date    ADHD (attention deficit hyperactivity disorder)     Allergy     Arthritis     Cancer     Cataract     Clotting disorder     Diabetes mellitus, type 2     Ulcer      Past Surgical History:   Procedure Laterality Date    CHOLECYSTECTOMY      COLON SURGERY      EYE SURGERY      FRACTURE SURGERY      HERNIA REPAIR      JOINT REPLACEMENT      SPINE SURGERY      TONSILLECTOMY      VASECTOMY       No family history on file.  Social History     Social History    Marital status:      Spouse name: N/A    Number of children: N/A    Years of education: N/A     Social History Main Topics    Smoking status: Former Smoker    Smokeless tobacco: Former User    Alcohol use 2.4 oz/week     4 Cans of beer per week    Drug use: No    Sexual activity: No     Other Topics Concern    Not on file     Social History Narrative    No narrative on file         Medications/Allergies: See med card    Vitals:    04/04/18 1038   BP: (!) 150/75   Pulse: 83   Weight: 108.9 kg (240 lb)   Height: 6' 3" (1.905 m)   PainSc:   8   PainLoc: Back         Physical exam:    GENERAL: A and O x3, the patient appears well groomed and is in no acute distress.  Skin: No rashes or obvious lesions  HEENT: normocephalic, atraumatic  CARDIOVASCULAR:  RRR  LUNGS: non labored breathing  ABDOMEN: soft, nontender   UPPER EXTREMITIES: Normal alignment, normal range of motion, no atrophy, no skin changes,  hair growth and nail growth normal and equal bilaterally. No swelling, no tenderness.    LOWER EXTREMITIES:  Normal alignment, normal range of motion, no atrophy, no skin changes,  hair growth and nail growth normal and equal bilaterally. No swelling, no tenderness.    LUMBAR SPINE  Lumbar spine: ROM is full with flexion extension and oblique extension with no increased pain.  + Mild increase in pain bilaterally with axial loading.  Jalen's test " causes no increased pain on either side.    Supine straight leg raise is negative bilaterally.    Internal and external rotation of the hip causes no increased pain on either side.  Myofascial exam: No tenderness to palpation across lumbar paraspinous muscles.      MENTAL STATUS: normal orientation, speech, language, and fund of knowledge for social situation.  Emotional state appropriate.    CRANIAL NERVES:  II:  PERRL bilaterally,   III,IV,VI: EOMI.    V:  Facial sensation equal bilaterally  VII:  Facial motor function normal.  VIII:  Hearing equal to finger rub bilaterally  IX/X: Gag normal, palate symmetric  XI:  Shoulder shrug equal, head turn equal  XII:  Tongue midline without fasciculations      MOTOR: Tone and bulk: normal bilateral upper and lower Strength: normal   Delt Bi Tri WE WF     R 5 5 5 5 5 5   L 5 5 5 5 5 5     IP ADD ABD Quad TA Gas HAM  R 5 5 5 5 5 5 5  L 5 5 5 5 5 5 5    SENSATION: Light touch and pinprick intact bilaterally  REFLEXES: normal, symmetric, nonbrisk.  Toes down, no clonus. No hoffmans.  GAIT: normal rise, base, steps, and arm swing.        Imaging:  MRI 6.2016  Mild ligamentum flavum hypertrophy noted at L3, L5.  Mild facet arthropathy L4-5.      Assessment:  Mr. Bansal is a 82 y.o. male with back pain  1. DDD (degenerative disc disease), lumbar     2. Facet arthropathy       Plan:  1.  I have stressed the importance of physical activity and exercise to improve overall health  2. Monitor progress and consider repeat lumbar MB RFA at L3, ,4, 5 bilaterally  3. Recommend formal PT once he returns home  4. Recommend IESI at L5-S1.   5. F/u prn. He will be returning to [place] next week

## 2018-04-17 RX ORDER — RIVAROXABAN 20 MG/1
TABLET, FILM COATED ORAL
Qty: 90 TABLET | Refills: 3 | Status: ON HOLD | OUTPATIENT
Start: 2018-04-17 | End: 2020-02-28 | Stop reason: SDUPTHER

## 2018-11-26 ENCOUNTER — OFFICE VISIT (OUTPATIENT)
Dept: PAIN MEDICINE | Facility: CLINIC | Age: 83
End: 2018-11-26
Payer: MEDICARE

## 2018-11-26 VITALS
HEIGHT: 75 IN | SYSTOLIC BLOOD PRESSURE: 169 MMHG | HEART RATE: 84 BPM | BODY MASS INDEX: 29.84 KG/M2 | DIASTOLIC BLOOD PRESSURE: 69 MMHG | WEIGHT: 240 LBS

## 2018-11-26 DIAGNOSIS — M47.896 OTHER SPONDYLOSIS, LUMBAR REGION: ICD-10-CM

## 2018-11-26 DIAGNOSIS — M51.36 DDD (DEGENERATIVE DISC DISEASE), LUMBAR: Primary | ICD-10-CM

## 2018-11-26 DIAGNOSIS — M54.16 LUMBAR RADICULOPATHY: Primary | ICD-10-CM

## 2018-11-26 DIAGNOSIS — M54.16 LUMBAR RADICULITIS: ICD-10-CM

## 2018-11-26 PROCEDURE — 99213 OFFICE O/P EST LOW 20 MIN: CPT | Mod: PBBFAC,PN | Performed by: ANESTHESIOLOGY

## 2018-11-26 PROCEDURE — 99214 OFFICE O/P EST MOD 30 MIN: CPT | Mod: S$PBB,,, | Performed by: ANESTHESIOLOGY

## 2018-11-26 PROCEDURE — 99999 PR PBB SHADOW E&M-EST. PATIENT-LVL III: CPT | Mod: PBBFAC,,, | Performed by: ANESTHESIOLOGY

## 2018-11-26 NOTE — PROGRESS NOTES
Referring Physician: No ref. provider found    PCP: Dave Enriquez MD      CC:  Low back pain    Interval History:  Mr. Guzmán is a 83 y.o. male with a known patient to our clinic.  Was last seen in April 2018.  He has history chronic lower back pain.  Lower back pain has improved following lumbar MB RFA procedure. His main complaint is bilateral buttock and posterior thigh pain with walking >200 steps. Pain resolves with rest.. He lives in [place]. 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  He denies any worsening weakness.  No bowel bladder changes PE  Prior HPI:   Ric Guzmán is a 83 y.o. male with PMHx DM2, clotting disorder on NOAC, HTN, s/p x5 CIARA for back pain in 2012, SCS placement in 2015 with subsequent removal in 2016 presents with c/o lower back/upper buttock pain.  He states it has been present for many years.  It does not radiate.  It is constant,  It is described as an aching.  Exacerbating factors are standing, alleviating factors are rest.  It is constant.  No bowel or bladder incontinence.  No other associated symptoms. .      Interventional History:  Lumbar MBB at L3, ,4,5 bilaterally 50% relief 3/5/18  Lumbar MB RFA at L3, ,4,5 bilaterally 80% relief of back pain 3/22/18    ROS:  CONSTITUTIONAL: No fevers, chills, night sweats, wt. loss, appetite changes  SKIN: no rashes or itching  ENT: No headaches, head trauma, vision changes, or eye pain  LYMPH NODES: None noticed   CV: No chest pain, palpitations.   RESP: No shortness of breath, dyspnea on exertion, cough, wheezing, or hemoptysis  GI: No nausea, emesis, diarrhea, constipation, melena, hematochezia, pain.    : No dysuria, hematuria, urgency, or frequency   HEME: No easy bruising, bleeding problems  PSYCHIATRIC: No depression, anxiety, psychosis, hallucinations.  NEURO: No seizures, memory loss, dizziness or difficulty sleeping  MSK: +HPI      Past Medical History:   Diagnosis Date    ADHD (attention deficit  "hyperactivity disorder)     Allergy     Arthritis     Cancer     Cataract     Clotting disorder     Diabetes mellitus, type 2     Ulcer      Past Surgical History:   Procedure Laterality Date    BLOCK-NERVE-MEDIAL BRANCH-LUMBAR Bilateral 3/5/2018    [facility] at [facility]    CHOLECYSTECTOMY      COLON SURGERY      EYE SURGERY      FRACTURE SURGERY      HERNIA REPAIR      JOINT REPLACEMENT      RADIOFREQUENCY THERMOCOAGULATION (RFTC)-NERVE-MEDIAN BRANCH-LUMBAR Bilateral 3/22/2018    [facility] at [facility]    SPINE SURGERY      TONSILLECTOMY      VASECTOMY       History reviewed. No pertinent family history.  Social History     Socioeconomic History    Marital status:      Spouse name: None    Number of children: None    Years of education: None    Highest education level: None   Social Needs    Financial resource strain: None    Food insecurity - worry: None    Food insecurity - inability: None    Transportation needs - medical: None    Transportation needs - non-medical: None   Occupational History    None   Tobacco Use    Smoking status: Former Smoker    Smokeless tobacco: Former User   Substance and Sexual Activity    Alcohol use: Yes     Alcohol/week: 2.4 oz     Types: 4 Cans of beer per week    Drug use: No    Sexual activity: No   Other Topics Concern    None   Social History Narrative    None         Medications/Allergies: See med card    Vitals:    11/26/18 1020   BP: (!) 169/69   Pulse: 84   Weight: 108.9 kg (240 lb)   Height: 6' 3" (1.905 m)   PainSc:   7   PainLoc: Back         Physical exam:    GENERAL: A and O x3, the patient appears well groomed and is in no acute distress.  Skin: No rashes or obvious lesions  HEENT: normocephalic, atraumatic  CARDIOVASCULAR:  RRR  LUNGS: non labored breathing  ABDOMEN: soft, nontender   UPPER EXTREMITIES: Normal alignment, normal range of motion, no atrophy, no skin changes,  hair growth and nail " growth normal and equal bilaterally. No swelling, no tenderness.    LOWER EXTREMITIES:  Normal alignment, normal range of motion, no atrophy, no skin changes,  hair growth and nail growth normal and equal bilaterally. No swelling, no tenderness.    LUMBAR SPINE  Lumbar spine: ROM is full with flexion extension and oblique extension with no increased pain.  + Mild increase in pain bilaterally with axial loading.  Jalen's test causes no increased pain on either side.    Supine straight leg raise is negative bilaterally.    Internal and external rotation of the hip causes no increased pain on either side.  Myofascial exam: No tenderness to palpation across lumbar paraspinous muscles.      MENTAL STATUS: normal orientation, speech, language, and fund of knowledge for social situation.  Emotional state appropriate.    CRANIAL NERVES:  II:  PERRL bilaterally,   III,IV,VI: EOMI.    V:  Facial sensation equal bilaterally  VII:  Facial motor function normal.  VIII:  Hearing equal to finger rub bilaterally  IX/X: Gag normal, palate symmetric  XI:  Shoulder shrug equal, head turn equal  XII:  Tongue midline without fasciculations      MOTOR: Tone and bulk: normal bilateral upper and lower Strength: normal   Delt Bi Tri WE WF     R 5 5 5 5 5 5   L 5 5 5 5 5 5     IP ADD ABD Quad TA Gas HAM  R 5 5 5 5 5 5 5  L 5 5 5 5 5 5 5    SENSATION: Light touch and pinprick intact bilaterally  REFLEXES: normal, symmetric, nonbrisk.  Toes down, no clonus. No hoffmans.  GAIT: normal rise, base, steps, and arm swing.        Imaging:  MRI 6.2016  Mild ligamentum flavum hypertrophy noted at L3, L5.  Mild facet arthropathy L4-5.      Assessment:  Mr. Bansal is a 83 y.o. male with back pain  1. DDD (degenerative disc disease), lumbar     2. Lumbar radiculitis     3. Other spondylosis, lumbar region       Plan:  1.  I have stressed the importance of physical activity and exercise to improve overall health  2. Monitor progress and consider  repeat lumbar MB RFA at L3, ,4, 5 bilaterally  3. Recommend formal PT once he returns home  4.  Schedule caudal epidural steroid injection to see help with his buttock and radicular pain.  5.  Follow-up after procedure

## 2018-11-26 NOTE — H&P (VIEW-ONLY)
Referring Physician: No ref. provider found    PCP: Dave Enriquez MD      CC:  Low back pain    Interval History:  Mr. Guzmán is a 83 y.o. male with a known patient to our clinic.  Was last seen in April 2018.  He has history chronic lower back pain.  Lower back pain has improved following lumbar MB RFA procedure. His main complaint is bilateral buttock and posterior thigh pain with walking >200 steps. Pain resolves with rest.. He lives in [place]. 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  He denies any worsening weakness.  No bowel bladder changes PE  Prior HPI:   Ric Guzmán is a 83 y.o. male with PMHx DM2, clotting disorder on NOAC, HTN, s/p x5 CIARA for back pain in 2012, SCS placement in 2015 with subsequent removal in 2016 presents with c/o lower back/upper buttock pain.  He states it has been present for many years.  It does not radiate.  It is constant,  It is described as an aching.  Exacerbating factors are standing, alleviating factors are rest.  It is constant.  No bowel or bladder incontinence.  No other associated symptoms. .      Interventional History:  Lumbar MBB at L3, ,4,5 bilaterally 50% relief 3/5/18  Lumbar MB RFA at L3, ,4,5 bilaterally 80% relief of back pain 3/22/18    ROS:  CONSTITUTIONAL: No fevers, chills, night sweats, wt. loss, appetite changes  SKIN: no rashes or itching  ENT: No headaches, head trauma, vision changes, or eye pain  LYMPH NODES: None noticed   CV: No chest pain, palpitations.   RESP: No shortness of breath, dyspnea on exertion, cough, wheezing, or hemoptysis  GI: No nausea, emesis, diarrhea, constipation, melena, hematochezia, pain.    : No dysuria, hematuria, urgency, or frequency   HEME: No easy bruising, bleeding problems  PSYCHIATRIC: No depression, anxiety, psychosis, hallucinations.  NEURO: No seizures, memory loss, dizziness or difficulty sleeping  MSK: +HPI      Past Medical History:   Diagnosis Date    ADHD (attention deficit  "hyperactivity disorder)     Allergy     Arthritis     Cancer     Cataract     Clotting disorder     Diabetes mellitus, type 2     Ulcer      Past Surgical History:   Procedure Laterality Date    BLOCK-NERVE-MEDIAL BRANCH-LUMBAR Bilateral 3/5/2018    [facility]    CHOLECYSTECTOMY      COLON SURGERY      EYE SURGERY      FRACTURE SURGERY      HERNIA REPAIR      JOINT REPLACEMENT      RADIOFREQUENCY THERMOCOAGULATION (RFTC)-NERVE-MEDIAN BRANCH-LUMBAR Bilateral 3/22/2018    [facility] at [facility]    SPINE SURGERY      TONSILLECTOMY      VASECTOMY       History reviewed. No pertinent family history.  Social History     Socioeconomic History    Marital status:      Spouse name: None    Number of children: None    Years of education: None    Highest education level: None   Social Needs    Financial resource strain: None    Food insecurity - worry: None    Food insecurity - inability: None    Transportation needs - medical: None    Transportation needs - non-medical: None   Occupational History    None   Tobacco Use    Smoking status: Former Smoker    Smokeless tobacco: Former User   Substance and Sexual Activity    Alcohol use: Yes     Alcohol/week: 2.4 oz     Types: 4 Cans of beer per week    Drug use: No    Sexual activity: No   Other Topics Concern    None   Social History Narrative    None         Medications/Allergies: See med card    Vitals:    11/26/18 1020   BP: (!) 169/69   Pulse: 84   Weight: 108.9 kg (240 lb)   Height: 6' 3" (1.905 m)   PainSc:   7   PainLoc: Back         Physical exam:    GENERAL: A and O x3, the patient appears well groomed and is in no acute distress.  Skin: No rashes or obvious lesions  HEENT: normocephalic, atraumatic  CARDIOVASCULAR:  RRR  LUNGS: non labored breathing  ABDOMEN: soft, nontender   UPPER EXTREMITIES: Normal alignment, normal range of motion, no atrophy, no skin changes,  hair growth and nail " growth normal and equal bilaterally. No swelling, no tenderness.    LOWER EXTREMITIES:  Normal alignment, normal range of motion, no atrophy, no skin changes,  hair growth and nail growth normal and equal bilaterally. No swelling, no tenderness.    LUMBAR SPINE  Lumbar spine: ROM is full with flexion extension and oblique extension with no increased pain.  + Mild increase in pain bilaterally with axial loading.  Jalen's test causes no increased pain on either side.    Supine straight leg raise is negative bilaterally.    Internal and external rotation of the hip causes no increased pain on either side.  Myofascial exam: No tenderness to palpation across lumbar paraspinous muscles.      MENTAL STATUS: normal orientation, speech, language, and fund of knowledge for social situation.  Emotional state appropriate.    CRANIAL NERVES:  II:  PERRL bilaterally,   III,IV,VI: EOMI.    V:  Facial sensation equal bilaterally  VII:  Facial motor function normal.  VIII:  Hearing equal to finger rub bilaterally  IX/X: Gag normal, palate symmetric  XI:  Shoulder shrug equal, head turn equal  XII:  Tongue midline without fasciculations      MOTOR: Tone and bulk: normal bilateral upper and lower Strength: normal   Delt Bi Tri WE WF     R 5 5 5 5 5 5   L 5 5 5 5 5 5     IP ADD ABD Quad TA Gas HAM  R 5 5 5 5 5 5 5  L 5 5 5 5 5 5 5    SENSATION: Light touch and pinprick intact bilaterally  REFLEXES: normal, symmetric, nonbrisk.  Toes down, no clonus. No hoffmans.  GAIT: normal rise, base, steps, and arm swing.        Imaging:  MRI 6.2016  Mild ligamentum flavum hypertrophy noted at L3, L5.  Mild facet arthropathy L4-5.      Assessment:  Mr. Bansal is a 83 y.o. male with back pain  1. DDD (degenerative disc disease), lumbar     2. Lumbar radiculitis     3. Other spondylosis, lumbar region       Plan:  1.  I have stressed the importance of physical activity and exercise to improve overall health  2. Monitor progress and consider  repeat lumbar MB RFA at L3, ,4, 5 bilaterally  3. Recommend formal PT once he returns home  4.  Schedule caudal epidural steroid injection to see help with his buttock and radicular pain.  5.  Follow-up after procedure

## 2018-12-10 ENCOUNTER — HOSPITAL ENCOUNTER (OUTPATIENT)
Facility: AMBULARY SURGERY CENTER | Age: 83
Discharge: HOME | End: 2018-12-10
Attending: ANESTHESIOLOGY | Admitting: ANESTHESIOLOGY
Payer: MEDICARE

## 2018-12-10 DIAGNOSIS — M54.16 LUMBAR RADICULITIS: Primary | ICD-10-CM

## 2018-12-10 LAB — POCT GLUCOSE: 134 MG/DL (ref 70–110)

## 2018-12-10 PROCEDURE — 62323 NJX INTERLAMINAR LMBR/SAC: CPT | Mod: ,,, | Performed by: ANESTHESIOLOGY

## 2018-12-10 PROCEDURE — 99152 MOD SED SAME PHYS/QHP 5/>YRS: CPT | Mod: ,,, | Performed by: ANESTHESIOLOGY

## 2018-12-10 PROCEDURE — G8907 PT DOC NO EVENTS ON DISCHARG: HCPCS | Performed by: ANESTHESIOLOGY

## 2018-12-10 PROCEDURE — 62323 NJX INTERLAMINAR LMBR/SAC: CPT | Performed by: ANESTHESIOLOGY

## 2018-12-10 RX ORDER — MIDAZOLAM HYDROCHLORIDE 2 MG/2ML
INJECTION, SOLUTION INTRAMUSCULAR; INTRAVENOUS
Status: DISCONTINUED | OUTPATIENT
Start: 2018-12-10 | End: 2018-12-10 | Stop reason: HOSPADM

## 2018-12-10 RX ORDER — MIDAZOLAM HYDROCHLORIDE 1 MG/ML
INJECTION INTRAMUSCULAR; INTRAVENOUS
Status: DISCONTINUED
Start: 2018-12-10 | End: 2018-12-10 | Stop reason: HOSPADM

## 2018-12-10 RX ORDER — SODIUM CHLORIDE 9 MG/ML
INJECTION, SOLUTION INTRAVENOUS CONTINUOUS
Status: DISCONTINUED | OUTPATIENT
Start: 2018-12-10 | End: 2018-12-10 | Stop reason: HOSPADM

## 2018-12-10 RX ORDER — FENTANYL CITRATE 50 UG/ML
INJECTION, SOLUTION INTRAMUSCULAR; INTRAVENOUS
Status: DISCONTINUED
Start: 2018-12-10 | End: 2018-12-10 | Stop reason: HOSPADM

## 2018-12-10 RX ORDER — LIDOCAINE HYDROCHLORIDE 10 MG/ML
INJECTION, SOLUTION EPIDURAL; INFILTRATION; INTRACAUDAL; PERINEURAL
Status: DISCONTINUED | OUTPATIENT
Start: 2018-12-10 | End: 2018-12-10 | Stop reason: HOSPADM

## 2018-12-10 RX ORDER — FENTANYL CITRATE 50 UG/ML
INJECTION, SOLUTION INTRAMUSCULAR; INTRAVENOUS
Status: DISCONTINUED | OUTPATIENT
Start: 2018-12-10 | End: 2018-12-10 | Stop reason: HOSPADM

## 2018-12-10 RX ORDER — DEXAMETHASONE SODIUM PHOSPHATE 10 MG/ML
INJECTION INTRAMUSCULAR; INTRAVENOUS
Status: DISCONTINUED | OUTPATIENT
Start: 2018-12-10 | End: 2018-12-10 | Stop reason: HOSPADM

## 2018-12-10 RX ORDER — SODIUM CHLORIDE, SODIUM LACTATE, POTASSIUM CHLORIDE, CALCIUM CHLORIDE 600; 310; 30; 20 MG/100ML; MG/100ML; MG/100ML; MG/100ML
INJECTION, SOLUTION INTRAVENOUS ONCE AS NEEDED
Status: COMPLETED | OUTPATIENT
Start: 2018-12-10 | End: 2018-12-10

## 2018-12-10 RX ORDER — LIDOCAINE HYDROCHLORIDE 10 MG/ML
INJECTION, SOLUTION EPIDURAL; INFILTRATION; INTRACAUDAL; PERINEURAL
Status: DISPENSED
Start: 2018-12-10 | End: 2018-12-11

## 2018-12-10 RX ORDER — DEXAMETHASONE SODIUM PHOSPHATE 10 MG/ML
INJECTION INTRAMUSCULAR; INTRAVENOUS
Status: DISPENSED
Start: 2018-12-10 | End: 2018-12-11

## 2018-12-10 RX ADMIN — SODIUM CHLORIDE, SODIUM LACTATE, POTASSIUM CHLORIDE, CALCIUM CHLORIDE: 600; 310; 30; 20 INJECTION, SOLUTION INTRAVENOUS at 11:12

## 2018-12-10 NOTE — DISCHARGE INSTRUCTIONS
Recovery After Procedural Sedation (Adult)  You have been given medicine by vein to make you sleep during your surgery. This may have included both a pain medicine and sleeping medicine. Most of the effects have worn off. But you may still have some drowsiness for the next 6 to 8 hours.  Home care  Follow these guidelines when you get home:  · For the next 8 hours, you should be watched by a responsible adult. This person should make sure your condition is not getting worse.  · Don't drink any alcohol for the next 24 hours.  · Don't drive, operate dangerous machinery, or make important business or personal decisions during the next 24 hours.  Note: Your healthcare provider may tell you not to take any medicine by mouth for pain or sleep in the next 4 hours. These medicines may react with the medicines you were given in the hospital. This could cause a much stronger response than usual.  Follow-up care  Follow up with your healthcare provider if you are not alert and back to your usual level of activity within 12 hours.  When to seek medical advice  Call your healthcare provider right away if any of these occur:  · Drowsiness gets worse  · Weakness or dizziness gets worse  · Repeated vomiting  · You can't be awakened   Date Last Reviewed: 10/18/2016  © 9645-9172 [employer]. [address] [place] [address], [place] [address]. All rights reserved. This information is not intended as a substitute for professional medical care. Always follow your healthcare professional's instructions.      Pain injection instructions:     This procedure may take a couple weeks to relieve pain  You may get some pain relief from the local anesthetic initally.    No driving for 24 hrs.   Activity as tolerated- gradually increase activities.  Dont lift over 10 lbs for 24 hrs   No heat at injection sites x 2 days. No heating pads, hot tubs, saunas, or swimming in any body of water or pool for 2 days.  Use ice pack for mild swelling  and for comfort , apply for 20 minutes, remove for 20 minute intervals. No direct contact of ice itself  to skin.  May shower today. No tub baths for two days.      Resume Aspirin, Plavix, or Coumadin the day after the procedure unless otherwise instructed.   If diabetic,monitor your glucose carefully as steroids can increase your glucose level    Seek immediate medical help for:   Severe increase in your usual pain or appearance of new pain.  Prolonged (mor than 8 hours) or increasing weakness or numbness in the legs or arms. Numbing medicine was injected and can affect the messages to and from the brain and legs or arms.  .    Fever above 101 ,Drainage,redness,active bleeding, or increased swelling at the injection site.  Headache, shortness of breath, chest pain, or breathing problems.

## 2018-12-10 NOTE — OP NOTE
PROCEDURE DATE: 12/10/2018    PROCEDURE:  Caudal epidural steroid injection under fluoroscopy.    Diagnosis: Lumbar disc displacement without myelopathy  Post Op diagnosis: Same    PHYSICIAN: Joe Matos M.D.    MEDICATIONS INJECTED:  10 mg of dexamethasone and 4 ml of sterile, preservative-free NaCl.    LOCAL ANESTHETIC GIVEN:  Lidocaine 1%, 2 ml total    SEDATION MEDICATIONS: RN IV sedation    ESTIMATED BLOOD LOSS:  None    COMPLICATIONS:  None    TECHNIQUE:   After the patient was placed in prone position, the patient was prepped and draped in the usual sterile fashion using ChloraPrep and sterile towels.  Appropriate anatomic landmarks were determined by identifying the sacral hiatus in the lateral fluoroscopic view.  Local anesthetic was given via a 25g 1.5 inch needle by raising a wheal and infiltrating down to the periosteum.  A 3.5 inch 20 gauge touhy needle was introduced thru the sacral hiatus.  1ml of contrast was injected to confirm placement in the appropriate area and that there was no vascular uptake.  The medication was then injected slowly.  The patient tolerated the procedure well.    The patient was monitored after the procedure.  Patient was given post procedure and discharge instructions to follow at home.  The patient was discharged in a stable condition

## 2018-12-10 NOTE — DISCHARGE SUMMARY
[facility]  Discharge Note  Short Stay    Admit Date: 12/10/2018    Discharge Date and Time: 12/10/2018    Attending Physician: Joe Matos MD     Discharge Provider: Joe Matos    Diagnoses:  Active Hospital Problems    Diagnosis  POA    *Lumbar radiculitis [M54.16]  Yes      Resolved Hospital Problems   No resolved problems to display.       Hospital Course: Caudal CIARA  Discharged Condition: Good    Final Diagnoses:   Active Hospital Problems    Diagnosis  POA    *Lumbar radiculitis [M54.16]  Yes      Resolved Hospital Problems   No resolved problems to display.       Disposition: Home or Self Care    Follow up/Patient Instructions:    Medications:  Reconciled Home Medications:      Medication List      CONTINUE taking these medications    JANUVIA 100 MG Tab  Generic drug:  SITagliptin     lisinopril 2.5 MG tablet  Commonly known as:  PRINIVIL,ZESTRIL     pantoprazole 40 MG tablet  Commonly known as:  PROTONIX     SANTYL ointment  Generic drug:  collagenase     XARELTO 20 mg Tab  Generic drug:  rivaroxaban  TAKE 1 TABLET DAILY          Discharge Procedure Orders   Call MD for:  temperature >100.4     Call MD for:  persistent nausea and vomiting or diarrhea     Call MD for:  severe uncontrolled pain     Call MD for:  redness, tenderness, or signs of infection (pain, swelling, redness, odor or green/yellow discharge around incision site)     Call MD for:  difficulty breathing or increased cough     Call MD for:  severe persistent headache        Follow up with MD in 2-3 weeks    Discharge Procedure Orders (must include Diet, Follow-up, Activity):   Discharge Procedure Orders (must include Diet, Follow-up, Activity)   Call MD for:  temperature >100.4     Call MD for:  persistent nausea and vomiting or diarrhea     Call MD for:  severe uncontrolled pain     Call MD for:  redness, tenderness, or signs of infection (pain, swelling, redness, odor or green/yellow discharge around incision site)     Call MD  for:  difficulty breathing or increased cough     Call MD for:  severe persistent headache

## 2018-12-12 VITALS
DIASTOLIC BLOOD PRESSURE: 86 MMHG | HEART RATE: 71 BPM | BODY MASS INDEX: 29.84 KG/M2 | TEMPERATURE: 99 F | RESPIRATION RATE: 18 BRPM | HEIGHT: 75 IN | SYSTOLIC BLOOD PRESSURE: 200 MMHG | WEIGHT: 240 LBS | OXYGEN SATURATION: 95 %

## 2019-01-08 ENCOUNTER — OFFICE VISIT (OUTPATIENT)
Dept: PAIN MEDICINE | Facility: CLINIC | Age: 84
End: 2019-01-08
Payer: MEDICARE

## 2019-01-08 VITALS
HEART RATE: 85 BPM | WEIGHT: 240 LBS | BODY MASS INDEX: 29.84 KG/M2 | DIASTOLIC BLOOD PRESSURE: 69 MMHG | HEIGHT: 75 IN | SYSTOLIC BLOOD PRESSURE: 141 MMHG

## 2019-01-08 DIAGNOSIS — M54.16 LUMBAR RADICULITIS: Primary | ICD-10-CM

## 2019-01-08 DIAGNOSIS — M47.896 OTHER SPONDYLOSIS, LUMBAR REGION: ICD-10-CM

## 2019-01-08 DIAGNOSIS — M46.1 SACROILIITIS: ICD-10-CM

## 2019-01-08 DIAGNOSIS — M46.1 SACROILIITIS: Primary | ICD-10-CM

## 2019-01-08 DIAGNOSIS — M51.36 DDD (DEGENERATIVE DISC DISEASE), LUMBAR: ICD-10-CM

## 2019-01-08 PROCEDURE — 99213 OFFICE O/P EST LOW 20 MIN: CPT | Mod: PBBFAC,PN | Performed by: PHYSICIAN ASSISTANT

## 2019-01-08 PROCEDURE — 99213 PR OFFICE/OUTPT VISIT, EST, LEVL III, 20-29 MIN: ICD-10-PCS | Mod: S$PBB,,, | Performed by: PHYSICIAN ASSISTANT

## 2019-01-08 PROCEDURE — 99999 PR PBB SHADOW E&M-EST. PATIENT-LVL III: CPT | Mod: PBBFAC,,, | Performed by: PHYSICIAN ASSISTANT

## 2019-01-08 PROCEDURE — 99213 OFFICE O/P EST LOW 20 MIN: CPT | Mod: S$PBB,,, | Performed by: PHYSICIAN ASSISTANT

## 2019-01-08 PROCEDURE — 99999 PR PBB SHADOW E&M-EST. PATIENT-LVL III: ICD-10-PCS | Mod: PBBFAC,,, | Performed by: PHYSICIAN ASSISTANT

## 2019-01-08 RX ORDER — BLOOD-GLUCOSE METER
KIT MISCELLANEOUS
COMMUNITY
Start: 2019-01-07 | End: 2020-02-26 | Stop reason: DRUGHIGH

## 2019-01-08 RX ORDER — PIOGLITAZONEHYDROCHLORIDE 45 MG/1
45 TABLET ORAL DAILY
COMMUNITY
Start: 2018-12-25 | End: 2019-11-25

## 2019-01-08 NOTE — H&P (VIEW-ONLY)
Referring Physician: No ref. provider found    PCP: Dave Enriquez MD      CC:  Low back pain    Interval History:  Mr. Guzmán is a 83 y.o. male with chronic low back pain who presents today for f/u s/p caudal CIARA. Reports no improvement. His main complaint is bilateral buttock and posterior thigh pain with walking >200 steps. Pain resolves with rest. Reports Dr. Barksdale has recommend SIJ injections. He had a left hip replacement in 2017.  Lower back pain has improved following lumbar MB RFA procedure. . He lives in [place]. 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  He denies any worsening weakness.  No bowel bladder changes. Pain today is rated 8/10.    Prior HPI:   Ric Guzmán is a 83 y.o. male with PMHx DM2, clotting disorder on NOAC, HTN, s/p x5 CIARA for back pain in 2012, SCS placement in 2015 with subsequent removal in 2016 presents with c/o lower back/upper buttock pain.  He states it has been present for many years.  It does not radiate.  It is constant,  It is described as an aching.  Exacerbating factors are standing, alleviating factors are rest.  It is constant.  No bowel or bladder incontinence.  No other associated symptoms. .      Interventional History:  Lumbar MBB at L3, ,4,5 bilaterally 50% relief 3/5/18  Lumbar MB RFA at L3, ,4,5 bilaterally 80% relief of back pain 3/22/18  Caudal CIARA on  12/10/18 with no improvement in symptom.     ROS:  CONSTITUTIONAL: No fevers, chills, night sweats, wt. loss, appetite changes  SKIN: no rashes or itching  ENT: No headaches, head trauma, vision changes, or eye pain  LYMPH NODES: None noticed   CV: No chest pain, palpitations.   RESP: No shortness of breath, dyspnea on exertion, cough, wheezing, or hemoptysis  GI: No nausea, emesis, diarrhea, constipation, melena, hematochezia, pain.    : No dysuria, hematuria, urgency, or frequency   HEME: No easy bruising, bleeding problems  PSYCHIATRIC: No depression, anxiety, psychosis,  "hallucinations.  NEURO: No seizures, memory loss, dizziness or difficulty sleeping  MSK: +HPI      Past Medical History:   Diagnosis Date    ADHD (attention deficit hyperactivity disorder)     Allergy     Arthritis     Cancer     Cataract     Clotting disorder     Diabetes mellitus, type 2     Lumbar radiculitis 12/10/2018    Ulcer      Past Surgical History:   Procedure Laterality Date    BLOCK-NERVE-MEDIAL BRANCH-LUMBAR Bilateral 3/5/2018    [facility] by [facility]os [facility] at [facility] OR    CHOLECYSTECTOMY      COLON SURGERY      EYE SURGERY      FRACTURE SURGERY      HERNIA REPAIR      Injection-steroid-epidural-caudal N/A 12/10/2018    [facility] at [facility]    JOINT REPLACEMENT      RADIOFREQUENCY THERMOCOAGULATION (RFTC)-NERVE-MEDIAN BRANCH-LUMBAR Bilateral 3/22/2018    [facility] at [facility] OR    SPINE SURGERY      TONSILLECTOMY      VASECTOMY       History reviewed. No pertinent family history.  Social History     Socioeconomic History    Marital status:      Spouse name: None    Number of children: None    Years of education: None    Highest education level: None   Social Needs    Financial resource strain: None    Food insecurity - worry: None    Food insecurity - inability: None    Transportation needs - medical: None    Transportation needs - non-medical: None   Occupational History    None   Tobacco Use    Smoking status: Former Smoker    Smokeless tobacco: Former User   Substance and Sexual Activity    Alcohol use: Yes     Alcohol/week: 2.4 oz     Types: 4 Cans of beer per week    Drug use: No    Sexual activity: No   Other Topics Concern    None   Social History Narrative    None         Medications/Allergies: See med card    Vitals:    01/08/19 1014   BP: (!) 141/69   Pulse: 85   Weight: 108.9 kg (240 lb)   Height: 6' 3" (1.905 m)   PainSc:   8   PainLoc: Back         Physical exam:    GENERAL: A and O x3, the patient appears well " groomed and is in no acute distress.  Skin: No rashes or obvious lesions  HEENT: normocephalic, atraumatic  CARDIOVASCULAR:  RRR  LUNGS: non labored breathing  ABDOMEN: soft, nontender   UPPER EXTREMITIES: Normal alignment, normal range of motion, no atrophy, no skin changes,  hair growth and nail growth normal and equal bilaterally. No swelling, no tenderness.    LOWER EXTREMITIES:  Normal alignment, normal range of motion, no atrophy, no skin changes,  hair growth and nail growth normal and equal bilaterally. No swelling, no tenderness.    LUMBAR SPINE  Lumbar spine: ROM is full with flexion extension and oblique extension with no increased pain.  + Mild increase in pain bilaterally with axial loading.  Jalen's test causes no increased pain on either side.    Supine straight leg raise is negative bilaterally.    Internal and external rotation of the hip causes no increased pain on either side.  Myofascial exam: No tenderness to palpation across lumbar paraspinous muscles.      MENTAL STATUS: normal orientation, speech, language, and fund of knowledge for social situation.  Emotional state appropriate.    CRANIAL NERVES:  II:  PERRL bilaterally,   III,IV,VI: EOMI.    V:  Facial sensation equal bilaterally  VII:  Facial motor function normal.  VIII:  Hearing equal to finger rub bilaterally  IX/X: Gag normal, palate symmetric  XI:  Shoulder shrug equal, head turn equal  XII:  Tongue midline without fasciculations      MOTOR: Tone and bulk: normal bilateral upper and lower Strength: normal   Delt Bi Tri WE WF     R 5 5 5 5 5 5   L 5 5 5 5 5 5     IP ADD ABD Quad TA Gas HAM  R 5 5 5 5 5 5 5  L 5 5 5 5 5 5 5    SENSATION: Light touch and pinprick intact bilaterally  REFLEXES: normal, symmetric, nonbrisk.  Toes down, no clonus. No hoffmans.  GAIT: normal rise, base, steps, and arm swing.        Imaging:  MRI 6.2016  Mild ligamentum flavum hypertrophy noted at L3, L5.  Mild facet arthropathy L4-5.       Assessment:  Mr. Bansal is a 83 y.o. male with back pain  1. Lumbar radiculitis     2. Other spondylosis, lumbar region     3. DDD (degenerative disc disease), lumbar     4. Sacroiliitis       Plan:  1.  I have stressed the importance of physical activity and exercise to improve overall health  2. Monitor progress and consider repeat lumbar MB RFA at L3, ,4, 5 bilaterally  3. Recommend formal PT once he returns home  4. Bilateral sacroiliac injection. I have explained the risks, benefits, and alternatives of the procedure in detail. The patient voices understanding and all questions have been answered. The patient agrees to proceed as planned. Written Consent obtained.   5. Discussed possible new SCS. He is not interested.   6. F/u with Dr. Matos s/p SIJ injection.

## 2019-01-08 NOTE — PROGRESS NOTES
Referring Physician: No ref. provider found    PCP: Dave Enriquez MD      CC:  Low back pain    Interval History:  Mr. Guzmán is a 83 y.o. male with chronic low back pain who presents today for f/u s/p caudal CIARA. Reports no improvement. His main complaint is bilateral buttock and posterior thigh pain with walking >200 steps. Pain resolves with rest. Reports Dr. Barksdale has recommend SIJ injections. He had a left hip replacement in 2017.  Lower back pain has improved following lumbar MB RFA procedure. . He lives in [place]. 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  He denies any worsening weakness.  No bowel bladder changes. Pain today is rated 8/10.    Prior HPI:   Ric Guzmán is a 83 y.o. male with PMHx DM2, clotting disorder on NOAC, HTN, s/p x5 CIARA for back pain in 2012, SCS placement in 2015 with subsequent removal in 2016 presents with c/o lower back/upper buttock pain.  He states it has been present for many years.  It does not radiate.  It is constant,  It is described as an aching.  Exacerbating factors are standing, alleviating factors are rest.  It is constant.  No bowel or bladder incontinence.  No other associated symptoms. .      Interventional History:  Lumbar MBB at L3, ,4,5 bilaterally 50% relief 3/5/18  Lumbar MB RFA at L3, ,4,5 bilaterally 80% relief of back pain 3/22/18  Caudal CIARA on  12/10/18 with no improvement in symptom.     ROS:  CONSTITUTIONAL: No fevers, chills, night sweats, wt. loss, appetite changes  SKIN: no rashes or itching  ENT: No headaches, head trauma, vision changes, or eye pain  LYMPH NODES: None noticed   CV: No chest pain, palpitations.   RESP: No shortness of breath, dyspnea on exertion, cough, wheezing, or hemoptysis  GI: No nausea, emesis, diarrhea, constipation, melena, hematochezia, pain.    : No dysuria, hematuria, urgency, or frequency   HEME: No easy bruising, bleeding problems  PSYCHIATRIC: No depression, anxiety, psychosis,  "hallucinations.  NEURO: No seizures, memory loss, dizziness or difficulty sleeping  MSK: +HPI      Past Medical History:   Diagnosis Date    ADHD (attention deficit hyperactivity disorder)     Allergy     Arthritis     Cancer     Cataract     Clotting disorder     Diabetes mellitus, type 2     Lumbar radiculitis 12/10/2018    Ulcer      Past Surgical History:   Procedure Laterality Date    BLOCK-NERVE-MEDIAL BRANCH-LUMBAR Bilateral 3/5/2018    [facility]    CHOLECYSTECTOMY      COLON SURGERY      EYE SURGERY      FRACTURE SURGERY      HERNIA REPAIR      Injection-steroid-epidural-caudal N/A 12/10/2018    [facility]    JOINT REPLACEMENT      RADIOFREQUENCY THERMOCOAGULATION (RFTC)-NERVE-MEDIAN BRANCH-LUMBAR Bilateral 3/22/2018    [facility]    SPINE SURGERY      TONSILLECTOMY      VASECTOMY       History reviewed. No pertinent family history.  Social History     Socioeconomic History    Marital status:      Spouse name: None    Number of children: None    Years of education: None    Highest education level: None   Social Needs    Financial resource strain: None    Food insecurity - worry: None    Food insecurity - inability: None    Transportation needs - medical: None    Transportation needs - non-medical: None   Occupational History    None   Tobacco Use    Smoking status: Former Smoker    Smokeless tobacco: Former User   Substance and Sexual Activity    Alcohol use: Yes     Alcohol/week: 2.4 oz     Types: 4 Cans of beer per week    Drug use: No    Sexual activity: No   Other Topics Concern    None   Social History Narrative    None         Medications/Allergies: See med card    Vitals:    01/08/19 1014   BP: (!) 141/69   Pulse: 85   Weight: 108.9 kg (240 lb)   Height: 6' 3" (1.905 m)   PainSc:   8   PainLoc: Back         Physical exam:    GENERAL: A and O x3, the patient appears well " groomed and is in no acute distress.  Skin: No rashes or obvious lesions  HEENT: normocephalic, atraumatic  CARDIOVASCULAR:  RRR  LUNGS: non labored breathing  ABDOMEN: soft, nontender   UPPER EXTREMITIES: Normal alignment, normal range of motion, no atrophy, no skin changes,  hair growth and nail growth normal and equal bilaterally. No swelling, no tenderness.    LOWER EXTREMITIES:  Normal alignment, normal range of motion, no atrophy, no skin changes,  hair growth and nail growth normal and equal bilaterally. No swelling, no tenderness.    LUMBAR SPINE  Lumbar spine: ROM is full with flexion extension and oblique extension with no increased pain.  + Mild increase in pain bilaterally with axial loading.  Jalen's test causes no increased pain on either side.    Supine straight leg raise is negative bilaterally.    Internal and external rotation of the hip causes no increased pain on either side.  Myofascial exam: No tenderness to palpation across lumbar paraspinous muscles.      MENTAL STATUS: normal orientation, speech, language, and fund of knowledge for social situation.  Emotional state appropriate.    CRANIAL NERVES:  II:  PERRL bilaterally,   III,IV,VI: EOMI.    V:  Facial sensation equal bilaterally  VII:  Facial motor function normal.  VIII:  Hearing equal to finger rub bilaterally  IX/X: Gag normal, palate symmetric  XI:  Shoulder shrug equal, head turn equal  XII:  Tongue midline without fasciculations      MOTOR: Tone and bulk: normal bilateral upper and lower Strength: normal   Delt Bi Tri WE WF     R 5 5 5 5 5 5   L 5 5 5 5 5 5     IP ADD ABD Quad TA Gas HAM  R 5 5 5 5 5 5 5  L 5 5 5 5 5 5 5    SENSATION: Light touch and pinprick intact bilaterally  REFLEXES: normal, symmetric, nonbrisk.  Toes down, no clonus. No hoffmans.  GAIT: normal rise, base, steps, and arm swing.        Imaging:  MRI 6.2016  Mild ligamentum flavum hypertrophy noted at L3, L5.  Mild facet arthropathy L4-5.       Assessment:  Mr. Bansal is a 83 y.o. male with back pain  1. Lumbar radiculitis     2. Other spondylosis, lumbar region     3. DDD (degenerative disc disease), lumbar     4. Sacroiliitis       Plan:  1.  I have stressed the importance of physical activity and exercise to improve overall health  2. Monitor progress and consider repeat lumbar MB RFA at L3, ,4, 5 bilaterally  3. Recommend formal PT once he returns home  4. Bilateral sacroiliac injection. I have explained the risks, benefits, and alternatives of the procedure in detail. The patient voices understanding and all questions have been answered. The patient agrees to proceed as planned. Written Consent obtained.   5. Discussed possible new SCS. He is not interested.   6. F/u with Dr. Matos s/p SIJ injection.

## 2019-01-15 ENCOUNTER — LAB VISIT (OUTPATIENT)
Dept: LAB | Facility: HOSPITAL | Age: 84
End: 2019-01-15
Attending: FAMILY MEDICINE
Payer: MEDICARE

## 2019-01-15 ENCOUNTER — OFFICE VISIT (OUTPATIENT)
Dept: FAMILY MEDICINE | Facility: CLINIC | Age: 84
End: 2019-01-15
Payer: MEDICARE

## 2019-01-15 VITALS
WEIGHT: 249.19 LBS | RESPIRATION RATE: 18 BRPM | BODY MASS INDEX: 30.98 KG/M2 | HEIGHT: 75 IN | DIASTOLIC BLOOD PRESSURE: 81 MMHG | SYSTOLIC BLOOD PRESSURE: 183 MMHG | OXYGEN SATURATION: 95 % | HEART RATE: 86 BPM

## 2019-01-15 DIAGNOSIS — E11.9 TYPE 2 DIABETES MELLITUS WITHOUT COMPLICATION, WITHOUT LONG-TERM CURRENT USE OF INSULIN: ICD-10-CM

## 2019-01-15 DIAGNOSIS — M54.16 LUMBAR RADICULITIS: Primary | ICD-10-CM

## 2019-01-15 LAB
ALBUMIN SERPL BCP-MCNC: 4.2 G/DL
ALP SERPL-CCNC: 76 U/L
ALT SERPL W/O P-5'-P-CCNC: 26 U/L
ANION GAP SERPL CALC-SCNC: 9 MMOL/L
AST SERPL-CCNC: 27 U/L
BASOPHILS # BLD AUTO: 0.07 K/UL
BASOPHILS NFR BLD: 1.1 %
BILIRUB SERPL-MCNC: 0.5 MG/DL
BNP SERPL-MCNC: 141 PG/ML
BUN SERPL-MCNC: 30 MG/DL
CALCIUM SERPL-MCNC: 9.4 MG/DL
CHLORIDE SERPL-SCNC: 102 MMOL/L
CHOLEST SERPL-MCNC: 207 MG/DL
CHOLEST/HDLC SERPL: 5 {RATIO}
CO2 SERPL-SCNC: 26 MMOL/L
CREAT SERPL-MCNC: 0.9 MG/DL
DIFFERENTIAL METHOD: ABNORMAL
EOSINOPHIL # BLD AUTO: 0.1 K/UL
EOSINOPHIL NFR BLD: 2.1 %
ERYTHROCYTE [DISTWIDTH] IN BLOOD BY AUTOMATED COUNT: 13.1 %
EST. GFR  (AFRICAN AMERICAN): >60 ML/MIN/1.73 M^2
EST. GFR  (NON AFRICAN AMERICAN): >60 ML/MIN/1.73 M^2
ESTIMATED AVG GLUCOSE: 151 MG/DL
GLUCOSE SERPL-MCNC: 221 MG/DL
HBA1C MFR BLD HPLC: 6.9 %
HCT VFR BLD AUTO: 37.9 %
HDLC SERPL-MCNC: 41 MG/DL
HDLC SERPL: 19.8 %
HGB BLD-MCNC: 12.5 G/DL
IMM GRANULOCYTES # BLD AUTO: 0.03 K/UL
IMM GRANULOCYTES NFR BLD AUTO: 0.5 %
LDLC SERPL CALC-MCNC: 139.4 MG/DL
LYMPHOCYTES # BLD AUTO: 1 K/UL
LYMPHOCYTES NFR BLD: 15.6 %
MCH RBC QN AUTO: 32.5 PG
MCHC RBC AUTO-ENTMCNC: 33 G/DL
MCV RBC AUTO: 98 FL
MONOCYTES # BLD AUTO: 0.7 K/UL
MONOCYTES NFR BLD: 11.8 %
NEUTROPHILS # BLD AUTO: 4.2 K/UL
NEUTROPHILS NFR BLD: 68.9 %
NONHDLC SERPL-MCNC: 166 MG/DL
NRBC BLD-RTO: 0 /100 WBC
PLATELET # BLD AUTO: 165 K/UL
PMV BLD AUTO: 12 FL
POTASSIUM SERPL-SCNC: 4.5 MMOL/L
PROT SERPL-MCNC: 7.8 G/DL
RBC # BLD AUTO: 3.85 M/UL
SODIUM SERPL-SCNC: 137 MMOL/L
T4 FREE SERPL-MCNC: 1.09 NG/DL
TRIGL SERPL-MCNC: 133 MG/DL
TSH SERPL DL<=0.005 MIU/L-ACNC: 1.09 UIU/ML
WBC # BLD AUTO: 6.09 K/UL

## 2019-01-15 PROCEDURE — 84439 ASSAY OF FREE THYROXINE: CPT

## 2019-01-15 PROCEDURE — 83036 HEMOGLOBIN GLYCOSYLATED A1C: CPT

## 2019-01-15 PROCEDURE — 99214 OFFICE O/P EST MOD 30 MIN: CPT | Mod: S$PBB,,, | Performed by: FAMILY MEDICINE

## 2019-01-15 PROCEDURE — 99999 PR PBB SHADOW E&M-EST. PATIENT-LVL III: ICD-10-PCS | Mod: PBBFAC,,, | Performed by: FAMILY MEDICINE

## 2019-01-15 PROCEDURE — 84443 ASSAY THYROID STIM HORMONE: CPT

## 2019-01-15 PROCEDURE — 85025 COMPLETE CBC W/AUTO DIFF WBC: CPT

## 2019-01-15 PROCEDURE — 83880 ASSAY OF NATRIURETIC PEPTIDE: CPT

## 2019-01-15 PROCEDURE — 36415 COLL VENOUS BLD VENIPUNCTURE: CPT

## 2019-01-15 PROCEDURE — 80053 COMPREHEN METABOLIC PANEL: CPT

## 2019-01-15 PROCEDURE — 99999 PR PBB SHADOW E&M-EST. PATIENT-LVL III: CPT | Mod: PBBFAC,,, | Performed by: FAMILY MEDICINE

## 2019-01-15 PROCEDURE — 99213 OFFICE O/P EST LOW 20 MIN: CPT | Mod: PBBFAC,PN | Performed by: FAMILY MEDICINE

## 2019-01-15 PROCEDURE — 99214 PR OFFICE/OUTPT VISIT, EST, LEVL IV, 30-39 MIN: ICD-10-PCS | Mod: S$PBB,,, | Performed by: FAMILY MEDICINE

## 2019-01-15 PROCEDURE — 80061 LIPID PANEL: CPT

## 2019-01-15 NOTE — PROGRESS NOTES
"Subjective:       Patient ID: Ric Guzmán is a 83 y.o. male.    Chief Complaint: Follow-up    Follow up    In regard to the patient's diabetes, patient reports that blood sugars have been Good control. Patient denieshypoglycemia. Patient is not currently on insulin therapy. Patient is on ACE/ARB and is not on statin. Last a1c was 6.5 in [place].    Having some blurry vision  Saw optho in [place]  Nothing worrisome          Review of Systems   Constitutional: Negative for activity change, appetite change, fatigue and fever.   HENT: Negative for congestion, dental problem, ear discharge, hearing loss, postnasal drip, sinus pain, sneezing and trouble swallowing.    Eyes: Positive for visual disturbance. Negative for photophobia and discharge.   Respiratory: Negative for apnea, cough and shortness of breath.    Cardiovascular: Negative for chest pain.   Gastrointestinal: Negative for abdominal distention, abdominal pain, blood in stool, diarrhea, nausea and vomiting.   Endocrine: Negative for cold intolerance.   Genitourinary: Negative for difficulty urinating, flank pain, frequency, hematuria and testicular pain.   Musculoskeletal: Positive for arthralgias, back pain and gait problem. Negative for myalgias.   Skin: Negative for color change.   Allergic/Immunologic: Negative for environmental allergies, food allergies and immunocompromised state.   Neurological: Negative for dizziness, syncope, numbness and headaches.   Hematological: Negative for adenopathy. Does not bruise/bleed easily.   Psychiatric/Behavioral: Negative for agitation, confusion, decreased concentration, hallucinations, self-injury and sleep disturbance.   All other systems reviewed and are negative.        Reviewed family, medical, surgical, and social history.    Objective:      BP (!) 183/81 (BP Location: Right arm, Patient Position: Sitting, BP Method: Medium (Automatic))   Pulse 86   Resp 18   Ht 6' 3" (1.905 m)   Wt 113 kg (249 lb 3.2 oz)   " SpO2 95%   BMI 31.15 kg/m²   Physical Exam   Constitutional: He is oriented to person, place, and time. He appears well-developed and well-nourished. No distress.   HENT:   Head: Normocephalic and atraumatic.   Nose: Nose normal.   Mouth/Throat: Oropharynx is clear and moist. No oropharyngeal exudate.   Eyes: Conjunctivae and EOM are normal. Pupils are equal, round, and reactive to light.   Neck: Normal range of motion. No thyromegaly present.   Cardiovascular: Normal rate, regular rhythm, normal heart sounds and intact distal pulses. Exam reveals no gallop and no friction rub.   No murmur heard.  Pulmonary/Chest: Effort normal and breath sounds normal. No respiratory distress. He has no wheezes. He has no rales.   Abdominal: Soft. He exhibits no distension. There is no tenderness. There is no guarding.   Musculoskeletal: Normal range of motion. He exhibits tenderness and deformity. He exhibits no edema.   Neurological: He is alert and oriented to person, place, and time. He displays normal reflexes. No cranial nerve deficit or sensory deficit. He exhibits normal muscle tone. Coordination normal.   Skin: Skin is warm and dry. No rash noted. He is not diaphoretic. No erythema. No pallor.   Psychiatric: He has a normal mood and affect. His behavior is normal. Judgment and thought content normal.   Nursing note and vitals reviewed.      Assessment:       1. Lumbar radiculitis    2. Type 2 diabetes mellitus without complication, without long-term current use of insulin        Plan:       Lumbar radiculitis    Type 2 diabetes mellitus without complication, without long-term current use of insulin  -     CBC auto differential; Future; Expected date: 01/15/2019  -     Comprehensive metabolic panel; Future; Expected date: 01/15/2019  -     Hemoglobin A1c; Future; Expected date: 01/15/2019  -     Lipid panel; Future; Expected date: 01/15/2019  -     TSH; Future; Expected date: 01/15/2019  -     T4, free; Future; Expected  date: 01/15/2019  -     Brain natriuretic peptide; Future; Expected date: 01/15/2019            Risks, benefits, and side effects were discussed with the patient. All questions were answered to the fullest satisfaction of the patient, and pt verbalized understanding and agreement to treatment plan. Pt was to call with any new or worsening symptoms, or present to the ER.

## 2019-01-17 ENCOUNTER — HOSPITAL ENCOUNTER (OUTPATIENT)
Facility: AMBULARY SURGERY CENTER | Age: 84
Discharge: HOME | End: 2019-01-17
Attending: ANESTHESIOLOGY | Admitting: ANESTHESIOLOGY
Payer: MEDICARE

## 2019-01-17 ENCOUNTER — TELEPHONE (OUTPATIENT)
Dept: FAMILY MEDICINE | Facility: CLINIC | Age: 84
End: 2019-01-17

## 2019-01-17 DIAGNOSIS — G89.29 CHRONIC SI JOINT PAIN: Primary | ICD-10-CM

## 2019-01-17 DIAGNOSIS — M53.3 CHRONIC SI JOINT PAIN: Primary | ICD-10-CM

## 2019-01-17 LAB — POCT GLUCOSE: 153 MG/DL (ref 70–110)

## 2019-01-17 PROCEDURE — 99152 MOD SED SAME PHYS/QHP 5/>YRS: CPT | Mod: ,,, | Performed by: ANESTHESIOLOGY

## 2019-01-17 PROCEDURE — 27096 INJECT SACROILIAC JOINT: CPT | Mod: 50,,, | Performed by: ANESTHESIOLOGY

## 2019-01-17 PROCEDURE — 27096 INJECT SACROILIAC JOINT: CPT | Mod: RT | Performed by: ANESTHESIOLOGY

## 2019-01-17 PROCEDURE — 99152 PR MOD CONSCIOUS SEDATION, SAME PHYS, 5+ YRS, FIRST 15 MIN: ICD-10-PCS | Mod: ,,, | Performed by: ANESTHESIOLOGY

## 2019-01-17 PROCEDURE — 27096 PR INJECTION,SACROILIAC JOINT: ICD-10-PCS | Mod: 50,,, | Performed by: ANESTHESIOLOGY

## 2019-01-17 RX ORDER — FENTANYL CITRATE 50 UG/ML
INJECTION, SOLUTION INTRAMUSCULAR; INTRAVENOUS
Status: DISCONTINUED | OUTPATIENT
Start: 2019-01-17 | End: 2019-01-17 | Stop reason: HOSPADM

## 2019-01-17 RX ORDER — SODIUM CHLORIDE, SODIUM LACTATE, POTASSIUM CHLORIDE, CALCIUM CHLORIDE 600; 310; 30; 20 MG/100ML; MG/100ML; MG/100ML; MG/100ML
INJECTION, SOLUTION INTRAVENOUS ONCE AS NEEDED
Status: COMPLETED | OUTPATIENT
Start: 2019-01-17 | End: 2019-01-17

## 2019-01-17 RX ORDER — MIDAZOLAM HYDROCHLORIDE 1 MG/ML
INJECTION INTRAMUSCULAR; INTRAVENOUS
Status: DISCONTINUED | OUTPATIENT
Start: 2019-01-17 | End: 2019-01-17 | Stop reason: HOSPADM

## 2019-01-17 RX ORDER — BUPIVACAINE HYDROCHLORIDE 2.5 MG/ML
INJECTION, SOLUTION EPIDURAL; INFILTRATION; INTRACAUDAL
Status: DISCONTINUED | OUTPATIENT
Start: 2019-01-17 | End: 2019-01-17 | Stop reason: HOSPADM

## 2019-01-17 RX ORDER — METHYLPREDNISOLONE ACETATE 80 MG/ML
INJECTION, SUSPENSION INTRA-ARTICULAR; INTRALESIONAL; INTRAMUSCULAR; SOFT TISSUE
Status: DISCONTINUED | OUTPATIENT
Start: 2019-01-17 | End: 2019-01-17 | Stop reason: HOSPADM

## 2019-01-17 RX ORDER — FENTANYL CITRATE 50 UG/ML
INJECTION, SOLUTION INTRAMUSCULAR; INTRAVENOUS
Status: DISCONTINUED
Start: 2019-01-17 | End: 2019-01-17 | Stop reason: HOSPADM

## 2019-01-17 RX ORDER — LIDOCAINE HYDROCHLORIDE 10 MG/ML
INJECTION, SOLUTION EPIDURAL; INFILTRATION; INTRACAUDAL; PERINEURAL
Status: DISCONTINUED | OUTPATIENT
Start: 2019-01-17 | End: 2019-01-17 | Stop reason: HOSPADM

## 2019-01-17 RX ORDER — MIDAZOLAM HYDROCHLORIDE 1 MG/ML
INJECTION INTRAMUSCULAR; INTRAVENOUS
Status: DISCONTINUED
Start: 2019-01-17 | End: 2019-01-17 | Stop reason: HOSPADM

## 2019-01-17 RX ADMIN — SODIUM CHLORIDE, SODIUM LACTATE, POTASSIUM CHLORIDE, CALCIUM CHLORIDE: 600; 310; 30; 20 INJECTION, SOLUTION INTRAVENOUS at 11:01

## 2019-01-17 NOTE — OP NOTE
Procedure Date: 1/17/2019    PROCEDURE:  Bilateral sacroiliac joint injection utilizing fluoroscopy.    DIAGNOSIS: Bilateralsided sacroiliitis.  Post op diagnosis: same    PHYSICIAN: Joe Matos MD    MEDICATIONS INJECTED:  Methylprednisone 40mg and 1.5ml Bupivacaine 0.25% into each joint    LOCAL ANESTHETIC USED: Lidocaine 1%,1ml total.     SEDATION MEDICATIONS: RN IV sedation    ESTIMATED BLOOD LOSS: None    COMPLICATIONS: NOne    TECHNIQUE:   Time-out taken to identify patient and procedure side prior to starting the procedure. After placing the patient in the prone position, the patient was prepped and draped in the usual sterile fashion using ChloraPrep and sterile towels.  The area was determined under fluoroscopy in the AP view.  Lidocaine was injected by raising a wheal and going down to the periosteum using a 25-gauge 1.5 inch needle.  The 3.5 inch 22-gauge spinal needle was introduced into inferior opening of the right and left sacroiliac joint.  Negative pressure applied to confirm no intravascular placement.  0.5ml ml of contrast dye was injected to confirm placement and to confirm that there was no vascular uptake. The medication was then injected slowly. The patient tolerated the procedure well.    The patient was monitored after the procedure.  The patient was given post procedure and discharge instructions to follow at home. The patient was discharged in a stable condition

## 2019-01-17 NOTE — DISCHARGE SUMMARY
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       Disposition: Home or Self Care    Follow up/Patient Instructions:    Medications:  Reconciled Home Medications:      Medication List      CONTINUE taking these medications    FREESTYLE LITE STRIPS Strp  Generic drug:  blood sugar diagnostic     JANUVIA 100 MG Tab  Generic drug:  SITagliptin     lisinopril 2.5 MG tablet  Commonly known as:  PRINIVIL,ZESTRIL     pantoprazole 40 MG tablet  Commonly known as:  PROTONIX     pioglitazone 45 MG tablet  Commonly known as:  ACTOS     SANTYL ointment  Generic drug:  collagenase     XARELTO 20 mg Tab  Generic drug:  rivaroxaban  TAKE 1 TABLET DAILY          Discharge Procedure Orders   Call MD for:  temperature >100.4     Call MD for:  persistent nausea and vomiting or diarrhea     Call MD for:  severe uncontrolled pain     Call MD for:  redness, tenderness, or signs of infection (pain, swelling, redness, odor or green/yellow discharge around incision site)     Call MD for:  difficulty breathing or increased cough     Call MD for:  severe persistent headache        Follow up with MD in 2-3 weeks    Discharge Procedure Orders (must include Diet, Follow-up, Activity):   Discharge Procedure Orders (must include Diet, Follow-up, Activity)   Call MD for:  temperature >100.4     Call MD for:  persistent nausea and vomiting or diarrhea     Call MD for:  severe uncontrolled pain      Call MD for:  redness, tenderness, or signs of infection (pain, swelling, redness, odor or green/yellow discharge around incision site)     Call MD for:  difficulty breathing or increased cough     Call MD for:  severe persistent headache

## 2019-01-17 NOTE — DISCHARGE INSTRUCTIONS
Pain injection instructions:     This procedure may take a couple weeks to relieve pain  You may get some pain relief from the local anesthetic initally.    No driving for 24 hrs.   Activity as tolerated- gradually increase activities.  Dont lift over 10 lbs for 24 hrs   No heat at injection sites x 2 days. No heating pads, hot tubs, saunas, or swimming in any body of water or pool for 2 days.  Use ice pack for mild swelling and for comfort , apply for 20 minutes, remove for 20 minute intervals. No direct contact of ice itself  to skin.  May shower today. Do not allow shower water to hit injection sites for 2 days.No tub baths for two days.      Resume Aspirin, Plavix, or Coumadin the day after the procedure unless otherwise instructed.   If diabetic,monitor your glucose carefully as steroids can increase your glucose level    Seek immediate medical help for:   Severe increase in your usual pain or appearance of new pain.  Prolonged (mor than 8 hours) or increasing weakness or numbness in the legs or arms. Numbing medicine was injected and can affect the messages to and from the brain and legs or arms.  .    Fever above 100.4F ,Drainage,redness,active bleeding, or increased swelling at the injection site.  Headache, shortness of breath, chest pain, or breathing problems.

## 2019-01-17 NOTE — TELEPHONE ENCOUNTER
----- Message from Dave Enriquez MD sent at 1/17/2019  8:31 AM CST -----  Blood work was in the acceptable range, and we will discuss it further at his next visit.

## 2019-01-17 NOTE — PLAN OF CARE
Patient sitting in chair and states ready to go home; vital signs stable, denies pain nausea or any weakness. Able to stand from chair without dizziness Patient's spouse Lyndsay, present at chairside and states she is ready to take patient home and that she is driving the patient home.

## 2019-01-17 NOTE — TELEPHONE ENCOUNTER
----- Message from Lupe May sent at 1/17/2019  9:24 AM CST -----  Contact: self   Type:  Patient Returning Call    Who Called: patient   Who Left Message for Patient: Ariadne   Does the patient know what this is regarding?: n/a  Best Call Back Number: [phone]

## 2019-01-17 NOTE — TELEPHONE ENCOUNTER
I returned patient's call and informed that labs were in acceptable range.  Pt verbalized understanding.  Ariadne Ron

## 2019-01-18 VITALS
HEART RATE: 65 BPM | HEIGHT: 75 IN | BODY MASS INDEX: 29.84 KG/M2 | WEIGHT: 240 LBS | OXYGEN SATURATION: 95 % | SYSTOLIC BLOOD PRESSURE: 174 MMHG | RESPIRATION RATE: 18 BRPM | DIASTOLIC BLOOD PRESSURE: 80 MMHG | TEMPERATURE: 98 F

## 2019-02-15 ENCOUNTER — OFFICE VISIT (OUTPATIENT)
Dept: PAIN MEDICINE | Facility: CLINIC | Age: 84
End: 2019-02-15
Payer: MEDICARE

## 2019-02-15 VITALS
HEART RATE: 73 BPM | BODY MASS INDEX: 29.84 KG/M2 | WEIGHT: 240 LBS | SYSTOLIC BLOOD PRESSURE: 158 MMHG | DIASTOLIC BLOOD PRESSURE: 71 MMHG | HEIGHT: 75 IN

## 2019-02-15 DIAGNOSIS — M47.896 OTHER SPONDYLOSIS, LUMBAR REGION: Primary | ICD-10-CM

## 2019-02-15 DIAGNOSIS — M51.36 DDD (DEGENERATIVE DISC DISEASE), LUMBAR: ICD-10-CM

## 2019-02-15 DIAGNOSIS — G89.4 CHRONIC PAIN DISORDER: ICD-10-CM

## 2019-02-15 DIAGNOSIS — M46.1 SACROILIITIS: ICD-10-CM

## 2019-02-15 PROCEDURE — 99999 PR PBB SHADOW E&M-EST. PATIENT-LVL III: ICD-10-PCS | Mod: PBBFAC,,, | Performed by: ANESTHESIOLOGY

## 2019-02-15 PROCEDURE — 99214 OFFICE O/P EST MOD 30 MIN: CPT | Mod: S$PBB,,, | Performed by: ANESTHESIOLOGY

## 2019-02-15 PROCEDURE — 99214 PR OFFICE/OUTPT VISIT, EST, LEVL IV, 30-39 MIN: ICD-10-PCS | Mod: S$PBB,,, | Performed by: ANESTHESIOLOGY

## 2019-02-15 PROCEDURE — 99999 PR PBB SHADOW E&M-EST. PATIENT-LVL III: CPT | Mod: PBBFAC,,, | Performed by: ANESTHESIOLOGY

## 2019-02-15 PROCEDURE — 99213 OFFICE O/P EST LOW 20 MIN: CPT | Mod: PBBFAC,PN | Performed by: ANESTHESIOLOGY

## 2019-02-15 NOTE — H&P (VIEW-ONLY)
Referring Physician: No ref. provider found    PCP: Dave Enriquez MD      CC:  Low back pain    Interval History:  Mr. Guzmán is a 83 y.o. male with chronic low back pain who presents today for f/u s/p bilateral SI joint injections on January 17, 2019.  He reports 50% relief of his buttock pain.  He is able to stand and get up from a sitting position much better.  Pain is slowly wean.  He desires radiofrequency ablation procedure for his SI joints. He had a left hip replacement in 2017.  Lower back pain has improved following lumbar MB RFA procedure. . He lives in [place]. 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  He denies any worsening weakness.  No bowel bladder changes.   Prior HPI:   Ric Guzmán is a 83 y.o. male with PMHx DM2, clotting disorder on NOAC, HTN, s/p x5 CIARA for back pain in 2012, SCS placement in 2015 with subsequent removal in 2016 presents with c/o lower back/upper buttock pain.  He states it has been present for many years.  It does not radiate.  It is constant,  It is described as an aching.  Exacerbating factors are standing, alleviating factors are rest.  It is constant.  No bowel or bladder incontinence.  No other associated symptoms. .      Interventional History:  Lumbar MBB at L3, ,4,5 bilaterally 50% relief 3/5/18  Lumbar MB RFA at L3, ,4,5 bilaterally 80% relief of back pain 3/22/18  Caudal CIARA on  12/10/18 with no improvement in symptom.   Bilateral SI joint injection on 1/17/2019 with 50% relief of his bilateral buttock pain    ROS:  CONSTITUTIONAL: No fevers, chills, night sweats, wt. loss, appetite changes  SKIN: no rashes or itching  ENT: No headaches, head trauma, vision changes, or eye pain  LYMPH NODES: None noticed   CV: No chest pain, palpitations.   RESP: No shortness of breath, dyspnea on exertion, cough, wheezing, or hemoptysis  GI: No nausea, emesis, diarrhea, constipation, melena, hematochezia, pain.    : No dysuria, hematuria, urgency,  or frequency   HEME: No easy bruising, bleeding problems  PSYCHIATRIC: No depression, anxiety, psychosis, hallucinations.  NEURO: No seizures, memory loss, dizziness or difficulty sleeping  MSK: +HPI      Past Medical History:   Diagnosis Date    ADHD (attention deficit hyperactivity disorder)     Allergy     Arthritis     Cancer     Cataract     Clotting disorder     Diabetes mellitus, type 2     Lumbar radiculitis 12/10/2018    Ulcer      Past Surgical History:   Procedure Laterality Date    BLOCK, NERVE, SACROILIAC JOINT, LATERAL BRANCH Bilateral 1/17/2019    Performed by [facility]os [facility] at [facility] [place] [facility]    BLOCK-NERVE-MEDIAL BRANCH-LUMBAR Bilateral 3/5/2018    Performed [facility] at [facility] [place] [facility]    CHOLECYSTECTOMY      COLON SURGERY      EYE SURGERY      FRACTURE SURGERY      HERNIA REPAIR      Injection-steroid-epidural-caudal N/A 12/10/2018    Performed [facility] at [facility] [place] [facility]    JOINT REPLACEMENT      RADIOFREQUENCY THERMOCOAGULATION (RFTC)-NERVE-MEDIAN BRANCH-LUMBAR Bilateral 3/22/2018    Performed by [facility] at [facility] [place] [facility]    SPINE SURGERY      TONSILLECTOMY      VASECTOMY       History reviewed. No pertinent family history.  Social History     Socioeconomic History    Marital status:      Spouse name: None    Number of children: None    Years of education: None    Highest education level: None   Social Needs    Financial resource strain: None    Food insecurity - worry: None    Food insecurity - inability: None    Transportation needs - medical: None    Transportation needs - non-medical: None   Occupational History    None   Tobacco Use    Smoking status: Former Smoker    Smokeless tobacco: Former User   Substance and Sexual Activity    Alcohol use: Yes     Alcohol/week: 2.4 oz     Types: 4 Cans of beer per week    Drug use: No    Sexual activity: No   Other Topics Concern    None   Social History Narrative    None         Medications/Allergies: See med  "card    Vitals:    02/15/19 1022   BP: (!) 158/71   Pulse: 73   Weight: 108.9 kg (240 lb)   Height: 6' 3" (1.905 m)   PainSc:   9   PainLoc: Back         Physical exam:    GENERAL: A and O x3, the patient appears well groomed and is in no acute distress.  Skin: No rashes or obvious lesions  HEENT: normocephalic, atraumatic  CARDIOVASCULAR:  RRR  LUNGS: non labored breathing  ABDOMEN: soft, nontender   UPPER EXTREMITIES: Normal alignment, normal range of motion, no atrophy, no skin changes,  hair growth and nail growth normal and equal bilaterally. No swelling, no tenderness.    LOWER EXTREMITIES:  Normal alignment, normal range of motion, no atrophy, no skin changes,  hair growth and nail growth normal and equal bilaterally. No swelling, no tenderness.    LUMBAR SPINE  Lumbar spine: ROM is full with flexion extension and oblique extension with no increased pain.  + Mild increase in pain bilaterally with axial loading.  Jalen's test causes no increased pain on either side.    Supine straight leg raise is negative bilaterally.    Internal and external rotation of the hip causes no increased pain on either side.  Myofascial exam: No tenderness to palpation across lumbar paraspinous muscles.      MENTAL STATUS: normal orientation, speech, language, and fund of knowledge for social situation.  Emotional state appropriate.    CRANIAL NERVES:  II:  PERRL bilaterally,   III,IV,VI: EOMI.    V:  Facial sensation equal bilaterally  VII:  Facial motor function normal.  VIII:  Hearing equal to finger rub bilaterally  IX/X: Gag normal, palate symmetric  XI:  Shoulder shrug equal, head turn equal  XII:  Tongue midline without fasciculations      MOTOR: Tone and bulk: normal bilateral upper and lower Strength: normal   Delt Bi Tri WE WF     R 5 5 5 5 5 5   L 5 5 5 5 5 5     IP ADD ABD Quad TA Gas HAM  R 5 5 5 5 5 5 5  L 5 5 5 5 5 5 5    SENSATION: Light touch and pinprick intact bilaterally  REFLEXES: normal, symmetric, " nonbrisk.  Toes down, no clonus. No hoffmans.  GAIT: normal rise, base, steps, and arm swing.        Imaging:  MRI 6.2016  Mild ligamentum flavum hypertrophy noted at L3, L5.  Mild facet arthropathy L4-5.      Assessment:  Mr. Bansal is a 83 y.o. male with back pain  1. Other spondylosis, lumbar region     2. Sacroiliitis     3. DDD (degenerative disc disease), lumbar     4. Chronic pain disorder       Plan:  1.  I have stressed the importance of physical activity and exercise to improve overall health  2. Monitor progress and consider repeat lumbar MB RFA at L3, ,4, 5 bilaterally  3. Recommend formal PT once he returns home  4.  Schedule left then right sided L5 medial branch, S1 and S2 lateral branch radiofrequency ablation to help with his bilateral buttock pain.   5.  Follow-up at the procedures

## 2019-02-15 NOTE — H&P (VIEW-ONLY)
Referring Physician: No ref. provider found    PCP: Dave Enriquez MD      CC:  Low back pain    Interval History:  Mr. Guzmán is a 83 y.o. male with chronic low back pain who presents today for f/u s/p bilateral SI joint injections on January 17, 2019.  He reports 50% relief of his buttock pain.  He is able to stand and get up from a sitting position much better.  Pain is slowly wean.  He desires radiofrequency ablation procedure for his SI joints. He had a left hip replacement in 2017.  Lower back pain has improved following lumbar MB RFA procedure. . He lives in [place]. 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  He denies any worsening weakness.  No bowel bladder changes.   Prior HPI:   Ric Guzmán is a 83 y.o. male with PMHx DM2, clotting disorder on NOAC, HTN, s/p x5 CIARA for back pain in 2012, SCS placement in 2015 with subsequent removal in 2016 presents with c/o lower back/upper buttock pain.  He states it has been present for many years.  It does not radiate.  It is constant,  It is described as an aching.  Exacerbating factors are standing, alleviating factors are rest.  It is constant.  No bowel or bladder incontinence.  No other associated symptoms. .      Interventional History:  Lumbar MBB at L3, ,4,5 bilaterally 50% relief 3/5/18  Lumbar MB RFA at L3, ,4,5 bilaterally 80% relief of back pain 3/22/18  Caudal CIARA on  12/10/18 with no improvement in symptom.   Bilateral SI joint injection on 1/17/2019 with 50% relief of his bilateral buttock pain    ROS:  CONSTITUTIONAL: No fevers, chills, night sweats, wt. loss, appetite changes  SKIN: no rashes or itching  ENT: No headaches, head trauma, vision changes, or eye pain  LYMPH NODES: None noticed   CV: No chest pain, palpitations.   RESP: No shortness of breath, dyspnea on exertion, cough, wheezing, or hemoptysis  GI: No nausea, emesis, diarrhea, constipation, melena, hematochezia, pain.    : No dysuria, hematuria, urgency,  or frequency   HEME: No easy bruising, bleeding problems  PSYCHIATRIC: No depression, anxiety, psychosis, hallucinations.  NEURO: No seizures, memory loss, dizziness or difficulty sleeping  MSK: +HPI      Past Medical History:   Diagnosis Date    ADHD (attention deficit hyperactivity disorder)     Allergy     Arthritis     Cancer     Cataract     Clotting disorder     Diabetes mellitus, type 2     Lumbar radiculitis 12/10/2018    Ulcer      Past Surgical History:   Procedure Laterality Date    BLOCK, NERVE, SACROILIAC JOINT, LATERAL BRANCH Bilateral 1/17/2019    [facility]    BLOCK-NERVE-MEDIAL BRANCH-LUMBAR Bilateral 3/5/2018    [facility] at [facility]    CHOLECYSTECTOMY      COLON SURGERY      EYE SURGERY      FRACTURE SURGERY      HERNIA REPAIR      Injection-steroid-epidural-caudal N/A 12/10/2018    [facility] at [facility]    JOINT REPLACEMENT      RADIOFREQUENCY THERMOCOAGULATION (RFTC)-NERVE-MEDIAN BRANCH-LUMBAR Bilateral 3/22/2018    [facility] at [facility]    SPINE SURGERY      TONSILLECTOMY      VASECTOMY       History reviewed. No pertinent family history.  Social History     Socioeconomic History    Marital status:      Spouse name: None    Number of children: None    Years of education: None    Highest education level: None   Social Needs    Financial resource strain: None    Food insecurity - worry: None    Food insecurity - inability: None    Transportation needs - medical: None    Transportation needs - non-medical: None   Occupational History    None   Tobacco Use    Smoking status: Former Smoker    Smokeless tobacco: Former User   Substance and Sexual Activity    Alcohol use: Yes     Alcohol/week: 2.4 oz     Types: 4 Cans of beer per week    Drug use: No    Sexual activity: No   Other Topics Concern    None   Social History Narrative    None         Medications/Allergies: See med  "card    Vitals:    02/15/19 1022   BP: (!) 158/71   Pulse: 73   Weight: 108.9 kg (240 lb)   Height: 6' 3" (1.905 m)   PainSc:   9   PainLoc: Back         Physical exam:    GENERAL: A and O x3, the patient appears well groomed and is in no acute distress.  Skin: No rashes or obvious lesions  HEENT: normocephalic, atraumatic  CARDIOVASCULAR:  RRR  LUNGS: non labored breathing  ABDOMEN: soft, nontender   UPPER EXTREMITIES: Normal alignment, normal range of motion, no atrophy, no skin changes,  hair growth and nail growth normal and equal bilaterally. No swelling, no tenderness.    LOWER EXTREMITIES:  Normal alignment, normal range of motion, no atrophy, no skin changes,  hair growth and nail growth normal and equal bilaterally. No swelling, no tenderness.    LUMBAR SPINE  Lumbar spine: ROM is full with flexion extension and oblique extension with no increased pain.  + Mild increase in pain bilaterally with axial loading.  Jalen's test causes no increased pain on either side.    Supine straight leg raise is negative bilaterally.    Internal and external rotation of the hip causes no increased pain on either side.  Myofascial exam: No tenderness to palpation across lumbar paraspinous muscles.      MENTAL STATUS: normal orientation, speech, language, and fund of knowledge for social situation.  Emotional state appropriate.    CRANIAL NERVES:  II:  PERRL bilaterally,   III,IV,VI: EOMI.    V:  Facial sensation equal bilaterally  VII:  Facial motor function normal.  VIII:  Hearing equal to finger rub bilaterally  IX/X: Gag normal, palate symmetric  XI:  Shoulder shrug equal, head turn equal  XII:  Tongue midline without fasciculations      MOTOR: Tone and bulk: normal bilateral upper and lower Strength: normal   Delt Bi Tri WE WF     R 5 5 5 5 5 5   L 5 5 5 5 5 5     IP ADD ABD Quad TA Gas HAM  R 5 5 5 5 5 5 5  L 5 5 5 5 5 5 5    SENSATION: Light touch and pinprick intact bilaterally  REFLEXES: normal, symmetric, " nonbrisk.  Toes down, no clonus. No hoffmans.  GAIT: normal rise, base, steps, and arm swing.        Imaging:  MRI 6.2016  Mild ligamentum flavum hypertrophy noted at L3, L5.  Mild facet arthropathy L4-5.      Assessment:  Mr. Bansal is a 83 y.o. male with back pain  1. Other spondylosis, lumbar region     2. Sacroiliitis     3. DDD (degenerative disc disease), lumbar     4. Chronic pain disorder       Plan:  1.  I have stressed the importance of physical activity and exercise to improve overall health  2. Monitor progress and consider repeat lumbar MB RFA at L3, ,4, 5 bilaterally  3. Recommend formal PT once he returns home  4.  Schedule left then right sided L5 medial branch, S1 and S2 lateral branch radiofrequency ablation to help with his bilateral buttock pain.   5.  Follow-up at the procedures

## 2019-02-15 NOTE — PROGRESS NOTES
Referring Physician: No ref. provider found    PCP: Dave Enriquez MD      CC:  Low back pain    Interval History:  Mr. Guzmán is a 83 y.o. male with chronic low back pain who presents today for f/u s/p bilateral SI joint injections on January 17, 2019.  He reports 50% relief of his buttock pain.  He is able to stand and get up from a sitting position much better.  Pain is slowly wean.  He desires radiofrequency ablation procedure for his SI joints. He had a left hip replacement in 2017.  Lower back pain has improved following lumbar MB RFA procedure. . He lives in [place]. 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  He denies any worsening weakness.  No bowel bladder changes.   Prior HPI:   Ric Guzmán is a 83 y.o. male with PMHx DM2, clotting disorder on NOAC, HTN, s/p x5 CIARA for back pain in 2012, SCS placement in 2015 with subsequent removal in 2016 presents with c/o lower back/upper buttock pain.  He states it has been present for many years.  It does not radiate.  It is constant,  It is described as an aching.  Exacerbating factors are standing, alleviating factors are rest.  It is constant.  No bowel or bladder incontinence.  No other associated symptoms. .      Interventional History:  Lumbar MBB at L3, ,4,5 bilaterally 50% relief 3/5/18  Lumbar MB RFA at L3, ,4,5 bilaterally 80% relief of back pain 3/22/18  Caudal CIARA on  12/10/18 with no improvement in symptom.   Bilateral SI joint injection on 1/17/2019 with 50% relief of his bilateral buttock pain    ROS:  CONSTITUTIONAL: No fevers, chills, night sweats, wt. loss, appetite changes  SKIN: no rashes or itching  ENT: No headaches, head trauma, vision changes, or eye pain  LYMPH NODES: None noticed   CV: No chest pain, palpitations.   RESP: No shortness of breath, dyspnea on exertion, cough, wheezing, or hemoptysis  GI: No nausea, emesis, diarrhea, constipation, melena, hematochezia, pain.    : No dysuria, hematuria, urgency,  or frequency   HEME: No easy bruising, bleeding problems  PSYCHIATRIC: No depression, anxiety, psychosis, hallucinations.  NEURO: No seizures, memory loss, dizziness or difficulty sleeping  MSK: +HPI      Past Medical History:   Diagnosis Date    ADHD (attention deficit hyperactivity disorder)     Allergy     Arthritis     Cancer     Cataract     Clotting disorder     Diabetes mellitus, type 2     Lumbar radiculitis 12/10/2018    Ulcer      Past Surgical History:   Procedure Laterality Date    BLOCK, NERVE, SACROILIAC JOINT, LATERAL BRANCH Bilateral 1/17/2019    Per[facility] at [facility]    BLOCK-NERVE-MEDIAL BRANCH-LUMBAR Bilateral 3/5/2018    Per[facility] at [facility]    CHOLECYSTECTOMY      COLON SURGERY      EYE SURGERY      FRACTURE SURGERY      HERNIA REPAIR      Injection-steroid-epidural-caudal N/A 12/10/2018    Performed by [facility] at [facility]    JOINT REPLACEMENT      RADIOFREQUENCY THERMOCOAGULATION (RFTC)-NERVE-MEDIAN BRANCH-LUMBAR Bilateral 3/22/2018    Performed by [facility] at [facility]    SPINE SURGERY      TONSILLECTOMY      VASECTOMY       History reviewed. No pertinent family history.  Social History     Socioeconomic History    Marital status:      Spouse name: None    Number of children: None    Years of education: None    Highest education level: None   Social Needs    Financial resource strain: None    Food insecurity - worry: None    Food insecurity - inability: None    Transportation needs - medical: None    Transportation needs - non-medical: None   Occupational History    None   Tobacco Use    Smoking status: Former Smoker    Smokeless tobacco: Former User   Substance and Sexual Activity    Alcohol use: Yes     Alcohol/week: 2.4 oz     Types: 4 Cans of beer per week    Drug use: No    Sexual activity: No   Other Topics Concern    None   Social History Narrative    None         Medications/Allergies: See med  "card    Vitals:    02/15/19 1022   BP: (!) 158/71   Pulse: 73   Weight: 108.9 kg (240 lb)   Height: 6' 3" (1.905 m)   PainSc:   9   PainLoc: Back         Physical exam:    GENERAL: A and O x3, the patient appears well groomed and is in no acute distress.  Skin: No rashes or obvious lesions  HEENT: normocephalic, atraumatic  CARDIOVASCULAR:  RRR  LUNGS: non labored breathing  ABDOMEN: soft, nontender   UPPER EXTREMITIES: Normal alignment, normal range of motion, no atrophy, no skin changes,  hair growth and nail growth normal and equal bilaterally. No swelling, no tenderness.    LOWER EXTREMITIES:  Normal alignment, normal range of motion, no atrophy, no skin changes,  hair growth and nail growth normal and equal bilaterally. No swelling, no tenderness.    LUMBAR SPINE  Lumbar spine: ROM is full with flexion extension and oblique extension with no increased pain.  + Mild increase in pain bilaterally with axial loading.  Jalen's test causes no increased pain on either side.    Supine straight leg raise is negative bilaterally.    Internal and external rotation of the hip causes no increased pain on either side.  Myofascial exam: No tenderness to palpation across lumbar paraspinous muscles.      MENTAL STATUS: normal orientation, speech, language, and fund of knowledge for social situation.  Emotional state appropriate.    CRANIAL NERVES:  II:  PERRL bilaterally,   III,IV,VI: EOMI.    V:  Facial sensation equal bilaterally  VII:  Facial motor function normal.  VIII:  Hearing equal to finger rub bilaterally  IX/X: Gag normal, palate symmetric  XI:  Shoulder shrug equal, head turn equal  XII:  Tongue midline without fasciculations      MOTOR: Tone and bulk: normal bilateral upper and lower Strength: normal   Delt Bi Tri WE WF     R 5 5 5 5 5 5   L 5 5 5 5 5 5     IP ADD ABD Quad TA Gas HAM  R 5 5 5 5 5 5 5  L 5 5 5 5 5 5 5    SENSATION: Light touch and pinprick intact bilaterally  REFLEXES: normal, symmetric, " nonbrisk.  Toes down, no clonus. No hoffmans.  GAIT: normal rise, base, steps, and arm swing.        Imaging:  MRI 6.2016  Mild ligamentum flavum hypertrophy noted at L3, L5.  Mild facet arthropathy L4-5.      Assessment:  Mr. Bansal is a 83 y.o. male with back pain  1. Other spondylosis, lumbar region     2. Sacroiliitis     3. DDD (degenerative disc disease), lumbar     4. Chronic pain disorder       Plan:  1.  I have stressed the importance of physical activity and exercise to improve overall health  2. Monitor progress and consider repeat lumbar MB RFA at L3, ,4, 5 bilaterally  3. Recommend formal PT once he returns home  4.  Schedule left then right sided L5 medial branch, S1 and S2 lateral branch radiofrequency ablation to help with his bilateral buttock pain.   5.  Follow-up at the procedures

## 2019-02-25 ENCOUNTER — OFFICE VISIT (OUTPATIENT)
Dept: FAMILY MEDICINE | Facility: CLINIC | Age: 84
End: 2019-02-25
Payer: MEDICARE

## 2019-02-25 VITALS
HEIGHT: 75 IN | BODY MASS INDEX: 29.84 KG/M2 | DIASTOLIC BLOOD PRESSURE: 70 MMHG | OXYGEN SATURATION: 96 % | SYSTOLIC BLOOD PRESSURE: 154 MMHG | WEIGHT: 240 LBS | HEART RATE: 89 BPM | RESPIRATION RATE: 20 BRPM

## 2019-02-25 DIAGNOSIS — E11.9 TYPE 2 DIABETES MELLITUS WITHOUT COMPLICATION, WITHOUT LONG-TERM CURRENT USE OF INSULIN: ICD-10-CM

## 2019-02-25 DIAGNOSIS — M54.16 LUMBAR RADICULITIS: Primary | ICD-10-CM

## 2019-02-25 PROCEDURE — 99213 OFFICE O/P EST LOW 20 MIN: CPT | Mod: S$PBB,,, | Performed by: FAMILY MEDICINE

## 2019-02-25 PROCEDURE — 99999 PR PBB SHADOW E&M-EST. PATIENT-LVL III: ICD-10-PCS | Mod: PBBFAC,,, | Performed by: FAMILY MEDICINE

## 2019-02-25 PROCEDURE — 99999 PR PBB SHADOW E&M-EST. PATIENT-LVL III: CPT | Mod: PBBFAC,,, | Performed by: FAMILY MEDICINE

## 2019-02-25 PROCEDURE — 99213 PR OFFICE/OUTPT VISIT, EST, LEVL III, 20-29 MIN: ICD-10-PCS | Mod: S$PBB,,, | Performed by: FAMILY MEDICINE

## 2019-02-25 PROCEDURE — 99213 OFFICE O/P EST LOW 20 MIN: CPT | Mod: PBBFAC,PN | Performed by: FAMILY MEDICINE

## 2019-02-25 NOTE — PROGRESS NOTES
"Subjective:       Patient ID: Ric Guzmán is a 83 y.o. male.    Chief Complaint: Follow-up    Follow up    In regard to the patient's diabetes, patient reports that blood sugars have been Good control. Patient denieshypoglycemia. Patient is not currently on insulin therapy. Patient is on ACE/ARB and is not on statin. Last a1c was 6.5 in [place].        Review of Systems   Constitutional: Negative for activity change, appetite change, fatigue and fever.   HENT: Negative for congestion, dental problem, ear discharge, hearing loss, postnasal drip, sinus pain, sneezing and trouble swallowing.    Eyes: Positive for visual disturbance. Negative for photophobia and discharge.   Respiratory: Negative for apnea, cough and shortness of breath.    Cardiovascular: Negative for chest pain.   Gastrointestinal: Negative for abdominal distention, abdominal pain, blood in stool, diarrhea, nausea and vomiting.   Endocrine: Negative for cold intolerance.   Genitourinary: Negative for difficulty urinating, flank pain, frequency, hematuria and testicular pain.   Musculoskeletal: Positive for arthralgias, back pain and gait problem. Negative for myalgias.   Skin: Negative for color change.   Allergic/Immunologic: Negative for environmental allergies, food allergies and immunocompromised state.   Neurological: Negative for dizziness, syncope, numbness and headaches.   Hematological: Negative for adenopathy. Does not bruise/bleed easily.   Psychiatric/Behavioral: Negative for agitation, confusion, decreased concentration, hallucinations, self-injury and sleep disturbance.   All other systems reviewed and are negative.        Reviewed family, medical, surgical, and social history.    Objective:      BP (!) 154/70 (BP Location: Left arm, Patient Position: Sitting, BP Method: Medium (Automatic))   Pulse 89   Resp 20   Ht 6' 3" (1.905 m)   Wt 108.9 kg (240 lb)   SpO2 96%   BMI 30.00 kg/m²   Physical Exam   Constitutional: He is " oriented to person, place, and time. He appears well-developed and well-nourished. No distress.   HENT:   Head: Normocephalic and atraumatic.   Nose: Nose normal.   Mouth/Throat: Oropharynx is clear and moist. No oropharyngeal exudate.   Eyes: Conjunctivae and EOM are normal. Pupils are equal, round, and reactive to light.   Neck: Normal range of motion. No thyromegaly present.   Cardiovascular: Normal rate, regular rhythm, normal heart sounds and intact distal pulses. Exam reveals no gallop and no friction rub.   No murmur heard.  Pulmonary/Chest: Effort normal and breath sounds normal. No respiratory distress. He has no wheezes. He has no rales.   Abdominal: Soft. He exhibits no distension. There is no tenderness. There is no guarding.   Musculoskeletal: Normal range of motion. He exhibits tenderness and deformity. He exhibits no edema.   Neurological: He is alert and oriented to person, place, and time. He displays normal reflexes. No cranial nerve deficit or sensory deficit. He exhibits normal muscle tone. Coordination normal.   Skin: Skin is warm and dry. No rash noted. He is not diaphoretic. No erythema. No pallor.   Psychiatric: He has a normal mood and affect. His behavior is normal. Judgment and thought content normal.   Nursing note and vitals reviewed.      Assessment:       1. Lumbar radiculitis    2. Type 2 diabetes mellitus without complication, without long-term current use of insulin        Plan:       Lumbar radiculitis    Type 2 diabetes mellitus without complication, without long-term current use of insulin            Risks, benefits, and side effects were discussed with the patient. All questions were answered to the fullest satisfaction of the patient, and pt verbalized understanding and agreement to treatment plan. Pt was to call with any new or worsening symptoms, or present to the ER.

## 2019-03-01 ENCOUNTER — HOSPITAL ENCOUNTER (OUTPATIENT)
Facility: HOSPITAL | Age: 84
Discharge: HOME | End: 2019-03-01
Attending: ANESTHESIOLOGY | Admitting: ANESTHESIOLOGY
Payer: MEDICARE

## 2019-03-01 VITALS
DIASTOLIC BLOOD PRESSURE: 75 MMHG | WEIGHT: 240 LBS | TEMPERATURE: 98 F | OXYGEN SATURATION: 98 % | RESPIRATION RATE: 16 BRPM | HEART RATE: 67 BPM | HEIGHT: 75 IN | SYSTOLIC BLOOD PRESSURE: 168 MMHG | BODY MASS INDEX: 29.84 KG/M2

## 2019-03-01 DIAGNOSIS — M54.16 LUMBAR RADICULOPATHY: Primary | ICD-10-CM

## 2019-03-01 DIAGNOSIS — G89.29 CHRONIC SI JOINT PAIN: ICD-10-CM

## 2019-03-01 DIAGNOSIS — M53.3 CHRONIC SI JOINT PAIN: ICD-10-CM

## 2019-03-01 DIAGNOSIS — M47.896 OTHER SPONDYLOSIS, LUMBAR REGION: Primary | ICD-10-CM

## 2019-03-01 PROCEDURE — 99152 PR MOD CONSCIOUS SEDATION, SAME PHYS, 5+ YRS, FIRST 15 MIN: ICD-10-PCS | Mod: ,,, | Performed by: ANESTHESIOLOGY

## 2019-03-01 PROCEDURE — 99900104 DSU ONLY-NO CHARGE-EA ADD'L HR (STAT): Performed by: ANESTHESIOLOGY

## 2019-03-01 PROCEDURE — 64635 PR DESTROY LUMB/SAC FACET JNT: ICD-10-PCS | Mod: LT,,, | Performed by: ANESTHESIOLOGY

## 2019-03-01 PROCEDURE — 99152 MOD SED SAME PHYS/QHP 5/>YRS: CPT | Mod: ,,, | Performed by: ANESTHESIOLOGY

## 2019-03-01 PROCEDURE — 64640 PR DESTRUCT BY NEURO AGENT; OTHER PERIPH NERVE: ICD-10-PCS | Mod: 59,LT,, | Performed by: ANESTHESIOLOGY

## 2019-03-01 PROCEDURE — 71000015 HC POSTOP RECOV 1ST HR: Performed by: ANESTHESIOLOGY

## 2019-03-01 PROCEDURE — 64635 DESTROY LUMB/SAC FACET JNT: CPT | Mod: LT,,, | Performed by: ANESTHESIOLOGY

## 2019-03-01 PROCEDURE — 99900103 DSU ONLY-NO CHARGE-INITIAL HR (STAT): Performed by: ANESTHESIOLOGY

## 2019-03-01 PROCEDURE — 36000704 HC OR TIME LEV I 1ST 15 MIN: Performed by: ANESTHESIOLOGY

## 2019-03-01 PROCEDURE — 64640 INJECTION TREATMENT OF NERVE: CPT | Mod: 59,LT,, | Performed by: ANESTHESIOLOGY

## 2019-03-01 PROCEDURE — 63600175 PHARM REV CODE 636 W HCPCS: Performed by: ANESTHESIOLOGY

## 2019-03-01 PROCEDURE — 25000003 PHARM REV CODE 250: Performed by: ANESTHESIOLOGY

## 2019-03-01 PROCEDURE — 36000705 HC OR TIME LEV I EA ADD 15 MIN: Performed by: ANESTHESIOLOGY

## 2019-03-01 PROCEDURE — 27201423 OPTIME MED/SURG SUP & DEVICES STERILE SUPPLY: Performed by: ANESTHESIOLOGY

## 2019-03-01 RX ORDER — METHYLPREDNISOLONE ACETATE 80 MG/ML
INJECTION, SUSPENSION INTRA-ARTICULAR; INTRALESIONAL; INTRAMUSCULAR; SOFT TISSUE
Status: DISCONTINUED | OUTPATIENT
Start: 2019-03-01 | End: 2019-03-01 | Stop reason: HOSPADM

## 2019-03-01 RX ORDER — SODIUM CHLORIDE, SODIUM LACTATE, POTASSIUM CHLORIDE, CALCIUM CHLORIDE 600; 310; 30; 20 MG/100ML; MG/100ML; MG/100ML; MG/100ML
INJECTION, SOLUTION INTRAVENOUS ONCE AS NEEDED
Status: COMPLETED | OUTPATIENT
Start: 2019-03-01 | End: 2019-03-01

## 2019-03-01 RX ORDER — MIDAZOLAM HYDROCHLORIDE 1 MG/ML
INJECTION INTRAMUSCULAR; INTRAVENOUS
Status: DISCONTINUED | OUTPATIENT
Start: 2019-03-01 | End: 2019-03-01 | Stop reason: HOSPADM

## 2019-03-01 RX ORDER — BUPIVACAINE HYDROCHLORIDE 2.5 MG/ML
INJECTION, SOLUTION EPIDURAL; INFILTRATION; INTRACAUDAL
Status: DISCONTINUED | OUTPATIENT
Start: 2019-03-01 | End: 2019-03-01 | Stop reason: HOSPADM

## 2019-03-01 RX ORDER — LIDOCAINE HYDROCHLORIDE 10 MG/ML
INJECTION, SOLUTION EPIDURAL; INFILTRATION; INTRACAUDAL; PERINEURAL
Status: DISCONTINUED | OUTPATIENT
Start: 2019-03-01 | End: 2019-03-01 | Stop reason: HOSPADM

## 2019-03-01 RX ORDER — FENTANYL CITRATE 50 UG/ML
INJECTION, SOLUTION INTRAMUSCULAR; INTRAVENOUS
Status: DISCONTINUED | OUTPATIENT
Start: 2019-03-01 | End: 2019-03-01 | Stop reason: HOSPADM

## 2019-03-01 RX ORDER — LIDOCAINE HYDROCHLORIDE 20 MG/ML
INJECTION, SOLUTION EPIDURAL; INFILTRATION; INTRACAUDAL; PERINEURAL
Status: DISCONTINUED | OUTPATIENT
Start: 2019-03-01 | End: 2019-03-01 | Stop reason: HOSPADM

## 2019-03-01 RX ADMIN — SODIUM CHLORIDE, SODIUM LACTATE, POTASSIUM CHLORIDE, AND CALCIUM CHLORIDE 25 ML: .6; .31; .03; .02 INJECTION, SOLUTION INTRAVENOUS at 06:03

## 2019-03-01 NOTE — OP NOTE
PROCEDURE DATE: 3/1/2019      PROCEDURE:  Radiofrequency ablation of the left L5 medial branch nerve and left S1 and S2 lateral branch nerves utilizing fluoroscopy     DIAGNOSIS:  Other lumbar spondylosis; SI joint pain  Post op Diagnosis: Same     PHYSICIAN: Joe Matos MD     MEDICATIONS INJECTED:  From a mixture of 6ml of 0.25% bupivicaine and 80mg of methylprednisone,  1ml of this solution was injected at each level.     LOCAL ANESTHETIC USED: Lidocaine 1%, 2 ml given at each site.     SEDATION MEDICATIONS: RN IV sedation     ESTIMATED BLOOD LOSS:  none     COMPLICATIONS:  None     TECHNIQUE:  A time out was taken to identify patient and procedure side prior to starting the procedure. Laying in a prone position, the patient was prepped and draped in the usual sterile fashion using ChloraPrep and sterile towels.  The levels were determined under fluoroscopic guidance and then marked.  Local anesthetic was given by raising a wheal at the skin over each site and then infiltrated approximately 2cm deeper.  A 17-gauge  75 mm RF needle was introduced to the anatomic location of the left L5 medial branch nerve and left S1 and S2 lateral branch nerves.  Motor stimulation up to 2 Volts at each level confirmed no motor nerve involvement.  Impedance was less than 800 ohms at each level.  1ml of 2% lidocaine was instilled prior to lesioning.  Ablation was performed per level utilizing radiofrequency generator 80°C for 150 seconds. The above noted medication was then injected slowly. The patient tolerated the procedure well.     The patient was monitored after the procedure.  Patient was given post procedure and discharge instructions to follow at home.  The patient was discharged in a stable condition

## 2019-03-01 NOTE — DISCHARGE SUMMARY
[facility]  Discharge Note  Short Stay    Admit Date: 3/1/2019    Discharge Date and Time: 3/1/2019    Attending Physician: Joe Matos MD     Discharge Provider: Joe Matos    Diagnoses:  Active Hospital Problems    Diagnosis  POA    *Other spondylosis, lumbar region [M47.896]  Yes      Resolved Hospital Problems   No resolved problems to display.       Hospital Course: SI RFA  Discharged Condition: Good    Final Diagnoses:   Active Hospital Problems    Diagnosis  POA    *Other spondylosis, lumbar region [M47.896]  Yes      Resolved Hospital Problems   No resolved problems to display.       Disposition: Home or Self Care    Follow up/Patient Instructions:    Medications:  Reconciled Home Medications:      Medication List      CONTINUE taking these medications    FREESTYLE LITE STRIPS Strp  Generic drug:  blood sugar diagnostic     JANUVIA 100 MG Tab  Generic drug:  SITagliptin     lisinopril 2.5 MG tablet  Commonly known as:  PRINIVIL,ZESTRIL     pantoprazole 40 MG tablet  Commonly known as:  PROTONIX     pioglitazone 45 MG tablet  Commonly known as:  ACTOS     SANTYL ointment  Generic drug:  collagenase     XARELTO 20 mg Tab  Generic drug:  rivaroxaban  TAKE 1 TABLET DAILY          Discharge Procedure Orders   Call MD for:  temperature >100.4     Call MD for:  persistent nausea and vomiting or diarrhea     Call MD for:  severe uncontrolled pain     Call MD for:  redness, tenderness, or signs of infection (pain, swelling, redness, odor or green/yellow discharge around incision site)     Call MD for:  difficulty breathing or increased cough     Call MD for:  severe persistent headache        Follow up with MD in 2-3 weeks    Discharge Procedure Orders (must include Diet, Follow-up, Activity):   Discharge Procedure Orders (must include Diet, Follow-up, Activity)   Call MD for:  temperature >100.4     Call MD for:  persistent nausea and vomiting or diarrhea     Call MD for:  severe uncontrolled pain      Call MD for:  redness, tenderness, or signs of infection (pain, swelling, redness, odor or green/yellow discharge around incision site)     Call MD for:  difficulty breathing or increased cough     Call MD for:  severe persistent headache

## 2019-03-01 NOTE — DISCHARGE INSTRUCTIONS

## 2019-03-01 NOTE — PLAN OF CARE
Pt in pre op assessment complete. Pt has had RF before, pt has many blood bruises on his arms and has an ulcer on his right lower leg, from diabetes.  Has dentures in place  Will conitmue to monitor

## 2019-03-12 RX ORDER — PANTOPRAZOLE SODIUM 40 MG/1
40 TABLET, DELAYED RELEASE ORAL DAILY
Qty: 90 TABLET | Refills: 2 | Status: SHIPPED | OUTPATIENT
Start: 2019-03-12 | End: 2019-12-07 | Stop reason: SDUPTHER

## 2019-03-12 NOTE — TELEPHONE ENCOUNTER
----- Message from Abbie Martinez sent at 3/12/2019 11:12 AM CDT -----  Contact: Patient  Type:  RX Refill Request    Who Called:  Patient  Refill or New Rx:  New  RX Name and Strength:  pantoprazole (PROTONIX) 40 MG tablet  How is the patient currently taking it? (ex. 1XDay):  na  Is this a 30 day or 90 day RX:  na  Preferred Pharmacy with phone number:    [facility] - [address] [address]  Phone: [phone] Fax: [phone]  Local or Mail Order: Local  Ordering Provider:  Dr Joe Matos  [facility] Call Back Number:   Additional Information:  Calling to request a new prescription. Please advise.

## 2019-03-15 ENCOUNTER — HOSPITAL ENCOUNTER (OUTPATIENT)
Facility: HOSPITAL | Age: 84
Discharge: HOME | End: 2019-03-15
Attending: ANESTHESIOLOGY | Admitting: ANESTHESIOLOGY
Payer: MEDICARE

## 2019-03-15 DIAGNOSIS — G89.29 CHRONIC SI JOINT PAIN: ICD-10-CM

## 2019-03-15 DIAGNOSIS — M53.3 CHRONIC SI JOINT PAIN: ICD-10-CM

## 2019-03-15 DIAGNOSIS — M54.9 BACK PAIN: ICD-10-CM

## 2019-03-15 DIAGNOSIS — M47.896 OTHER SPONDYLOSIS, LUMBAR REGION: Primary | ICD-10-CM

## 2019-03-15 PROCEDURE — 63600175 PHARM REV CODE 636 W HCPCS: Performed by: ANESTHESIOLOGY

## 2019-03-15 PROCEDURE — 64640 INJECTION TREATMENT OF NERVE: CPT | Mod: 51,RT,, | Performed by: ANESTHESIOLOGY

## 2019-03-15 PROCEDURE — 36000705 HC OR TIME LEV I EA ADD 15 MIN: Performed by: ANESTHESIOLOGY

## 2019-03-15 PROCEDURE — 99152 MOD SED SAME PHYS/QHP 5/>YRS: CPT | Mod: ,,, | Performed by: ANESTHESIOLOGY

## 2019-03-15 PROCEDURE — 25000003 PHARM REV CODE 250: Performed by: ANESTHESIOLOGY

## 2019-03-15 PROCEDURE — 27201423 OPTIME MED/SURG SUP & DEVICES STERILE SUPPLY: Performed by: ANESTHESIOLOGY

## 2019-03-15 PROCEDURE — 99152 PR MOD CONSCIOUS SEDATION, SAME PHYS, 5+ YRS, FIRST 15 MIN: ICD-10-PCS | Mod: ,,, | Performed by: ANESTHESIOLOGY

## 2019-03-15 PROCEDURE — 36000704 HC OR TIME LEV I 1ST 15 MIN: Performed by: ANESTHESIOLOGY

## 2019-03-15 PROCEDURE — 64640 PR DESTRUCT BY NEURO AGENT; OTHER PERIPH NERVE: ICD-10-PCS | Mod: 51,RT,, | Performed by: ANESTHESIOLOGY

## 2019-03-15 PROCEDURE — 99900103 DSU ONLY-NO CHARGE-INITIAL HR (STAT): Performed by: ANESTHESIOLOGY

## 2019-03-15 PROCEDURE — 99900104 DSU ONLY-NO CHARGE-EA ADD'L HR (STAT): Performed by: ANESTHESIOLOGY

## 2019-03-15 PROCEDURE — 71000015 HC POSTOP RECOV 1ST HR: Performed by: ANESTHESIOLOGY

## 2019-03-15 PROCEDURE — 64635 PR DESTROY LUMB/SAC FACET JNT: ICD-10-PCS | Mod: RT,,, | Performed by: ANESTHESIOLOGY

## 2019-03-15 PROCEDURE — 64635 DESTROY LUMB/SAC FACET JNT: CPT | Mod: RT,,, | Performed by: ANESTHESIOLOGY

## 2019-03-15 RX ORDER — LIDOCAINE HYDROCHLORIDE 20 MG/ML
INJECTION, SOLUTION EPIDURAL; INFILTRATION; INTRACAUDAL; PERINEURAL
Status: DISCONTINUED | OUTPATIENT
Start: 2019-03-15 | End: 2019-03-15 | Stop reason: HOSPADM

## 2019-03-15 RX ORDER — LIDOCAINE HYDROCHLORIDE 10 MG/ML
INJECTION, SOLUTION EPIDURAL; INFILTRATION; INTRACAUDAL; PERINEURAL
Status: DISCONTINUED | OUTPATIENT
Start: 2019-03-15 | End: 2019-03-15 | Stop reason: HOSPADM

## 2019-03-15 RX ORDER — MIDAZOLAM HYDROCHLORIDE 1 MG/ML
INJECTION INTRAMUSCULAR; INTRAVENOUS
Status: DISCONTINUED | OUTPATIENT
Start: 2019-03-15 | End: 2019-03-15 | Stop reason: HOSPADM

## 2019-03-15 RX ORDER — FENTANYL CITRATE 50 UG/ML
INJECTION, SOLUTION INTRAMUSCULAR; INTRAVENOUS
Status: DISCONTINUED | OUTPATIENT
Start: 2019-03-15 | End: 2019-03-15 | Stop reason: HOSPADM

## 2019-03-15 RX ORDER — SODIUM CHLORIDE, SODIUM LACTATE, POTASSIUM CHLORIDE, CALCIUM CHLORIDE 600; 310; 30; 20 MG/100ML; MG/100ML; MG/100ML; MG/100ML
INJECTION, SOLUTION INTRAVENOUS CONTINUOUS
Status: DISCONTINUED | OUTPATIENT
Start: 2019-03-15 | End: 2020-02-24

## 2019-03-15 RX ORDER — BUPIVACAINE HYDROCHLORIDE 2.5 MG/ML
INJECTION, SOLUTION EPIDURAL; INFILTRATION; INTRACAUDAL
Status: DISCONTINUED | OUTPATIENT
Start: 2019-03-15 | End: 2019-03-15 | Stop reason: HOSPADM

## 2019-03-15 RX ORDER — METHYLPREDNISOLONE ACETATE 80 MG/ML
INJECTION, SUSPENSION INTRA-ARTICULAR; INTRALESIONAL; INTRAMUSCULAR; SOFT TISSUE
Status: DISCONTINUED | OUTPATIENT
Start: 2019-03-15 | End: 2019-03-15 | Stop reason: HOSPADM

## 2019-03-15 RX ADMIN — SODIUM CHLORIDE, SODIUM LACTATE, POTASSIUM CHLORIDE, AND CALCIUM CHLORIDE: .6; .31; .03; .02 INJECTION, SOLUTION INTRAVENOUS at 06:03

## 2019-03-15 NOTE — DISCHARGE INSTRUCTIONS

## 2019-03-15 NOTE — OP NOTE
PROCEDURE DATE: 3/15/2019      PROCEDURE:  Radiofrequency ablation of the right L5 medial branch nerve and right S1 and S2 lateral branch nerves utilizing fluoroscopy     DIAGNOSIS:  Other lumbar spondylosis; SI joint pain  Post op Diagnosis: Same     PHYSICIAN: Joe Matos MD     MEDICATIONS INJECTED:  From a mixture of 6ml of 0.25% bupivicaine and 80mg of methylprednisone,  1ml of this solution was injected at each level.     LOCAL ANESTHETIC USED: Lidocaine 1%, 2 ml given at each site.     SEDATION MEDICATIONS: RN IV sedation     ESTIMATED BLOOD LOSS:  none     COMPLICATIONS:  None     TECHNIQUE:  A time out was taken to identify patient and procedure side prior to starting the procedure. Laying in a prone position, the patient was prepped and draped in the usual sterile fashion using ChloraPrep and sterile towels.  The levels were determined under fluoroscopic guidance and then marked.  Local anesthetic was given by raising a wheal at the skin over each site and then infiltrated approximately 2cm deeper.  A 17-gauge  75 mm RF needle was introduced to the anatomic location of the right L5 medial branch nerve and right S1 and S2 lateral branch nerves.  Motor stimulation up to 2 Volts at each level confirmed no motor nerve involvement.  Impedance was less than 800 ohms at each level.  1ml of 2% lidocaine was instilled prior to lesioning.  Ablation was performed per level utilizing radiofrequency generator 80°C for 150 seconds. The above noted medication was then injected slowly. The patient tolerated the procedure well.     The patient was monitored after the procedure.  Patient was given post procedure and discharge instructions to follow at home.  The patient was discharged in a stable condition

## 2019-03-15 NOTE — PLAN OF CARE
Pt tolerating po and denies pain.  Ambulated to bathroom with cane and voided qs without difficulty.  States that he is ready for discharge

## 2019-03-15 NOTE — INTERVAL H&P NOTE
The patient has been examined and the H&P has been reviewed:    I concur with the findings and no changes have occurred since H&P was written.   This patient has been cleared for surgery in an ambulatory surgical facility    ASA 3,  Mallampatti Score 3  No history of anesthetic complications  Plan for RN IV sedation      Anesthesia/Surgery risks, benefits and alternative options discussed and understood by patient/family.          Active Hospital Problems    Diagnosis  POA    Back pain [M54.9]  Yes      Resolved Hospital Problems   No resolved problems to display.

## 2019-03-15 NOTE — DISCHARGE SUMMARY
[facility]  Discharge Note  Short Stay    Admit Date: 3/15/2019    Discharge Date and Time: 3/15/2019    Attending Physician: Joe Matos MD     Discharge Provider: Joe Matos    Diagnoses:  Active Hospital Problems    Diagnosis  POA    *Back pain [M54.9]  Yes      Resolved Hospital Problems   No resolved problems to display.       Hospital Course: SI RFA  Discharged Condition: Good    Final Diagnoses:   Active Hospital Problems    Diagnosis  POA    *Back pain [M54.9]  Yes      Resolved Hospital Problems   No resolved problems to display.       Disposition: Home or Self Care    Follow up/Patient Instructions:    Medications:  Reconciled Home Medications:      Medication List      CONTINUE taking these medications    EYE VITAMIN AND MINERALS ORAL  Take by mouth once daily.     FREESTYLE LITE STRIPS Strp  Generic drug:  blood sugar diagnostic     JANUVIA 100 MG Tab  Generic drug:  SITagliptin  Take 100 mg by mouth once daily.     lisinopril 2.5 MG tablet  Commonly known as:  PRINIVIL,ZESTRIL  Take 2.5 mg by mouth once daily.     multivitamin capsule  Take 1 capsule by mouth once daily.     pantoprazole 40 MG tablet  Commonly known as:  PROTONIX  Take 1 tablet (40 mg total) by mouth once daily.     pioglitazone 45 MG tablet  Commonly known as:  ACTOS  Take 45 mg by mouth once daily.     SANTYL ointment  Generic drug:  collagenase     XARELTO 20 mg Tab  Generic drug:  rivaroxaban  TAKE 1 TABLET DAILY          Discharge Procedure Orders   Call MD for:  temperature >100.4     Call MD for:  persistent nausea and vomiting or diarrhea     Call MD for:  severe uncontrolled pain     Call MD for:  redness, tenderness, or signs of infection (pain, swelling, redness, odor or green/yellow discharge around incision site)     Call MD for:  difficulty breathing or increased cough     Call MD for:  severe persistent headache        Follow up with MD in 2-3 weeks    Discharge Procedure Orders (must include Diet, Follow-up,  Activity):   Discharge Procedure Orders (must include Diet, Follow-up, Activity)   Call MD for:  temperature >100.4     Call MD for:  persistent nausea and vomiting or diarrhea     Call MD for:  severe uncontrolled pain     Call MD for:  redness, tenderness, or signs of infection (pain, swelling, redness, odor or green/yellow discharge around incision site)     Call MD for:  difficulty breathing or increased cough     Call MD for:  severe persistent headache

## 2019-03-18 VITALS
HEIGHT: 75 IN | OXYGEN SATURATION: 100 % | HEART RATE: 70 BPM | SYSTOLIC BLOOD PRESSURE: 170 MMHG | WEIGHT: 240 LBS | TEMPERATURE: 98 F | BODY MASS INDEX: 29.84 KG/M2 | DIASTOLIC BLOOD PRESSURE: 78 MMHG | RESPIRATION RATE: 18 BRPM

## 2019-04-09 ENCOUNTER — OFFICE VISIT (OUTPATIENT)
Dept: FAMILY MEDICINE | Facility: CLINIC | Age: 84
End: 2019-04-09
Payer: MEDICARE

## 2019-04-09 VITALS
BODY MASS INDEX: 31.21 KG/M2 | WEIGHT: 251 LBS | SYSTOLIC BLOOD PRESSURE: 138 MMHG | HEART RATE: 95 BPM | OXYGEN SATURATION: 96 % | HEIGHT: 75 IN | RESPIRATION RATE: 20 BRPM | DIASTOLIC BLOOD PRESSURE: 77 MMHG

## 2019-04-09 DIAGNOSIS — I10 ESSENTIAL HYPERTENSION: Primary | ICD-10-CM

## 2019-04-09 PROCEDURE — 99213 PR OFFICE/OUTPT VISIT, EST, LEVL III, 20-29 MIN: ICD-10-PCS | Mod: S$PBB,,, | Performed by: FAMILY MEDICINE

## 2019-04-09 PROCEDURE — 99999 PR PBB SHADOW E&M-EST. PATIENT-LVL III: ICD-10-PCS | Mod: PBBFAC,,, | Performed by: FAMILY MEDICINE

## 2019-04-09 PROCEDURE — 99999 PR PBB SHADOW E&M-EST. PATIENT-LVL III: CPT | Mod: PBBFAC,,, | Performed by: FAMILY MEDICINE

## 2019-04-09 PROCEDURE — 99213 OFFICE O/P EST LOW 20 MIN: CPT | Mod: PBBFAC,PN | Performed by: FAMILY MEDICINE

## 2019-04-09 PROCEDURE — 99213 OFFICE O/P EST LOW 20 MIN: CPT | Mod: S$PBB,,, | Performed by: FAMILY MEDICINE

## 2019-04-09 RX ORDER — LISINOPRIL 2.5 MG/1
2.5 TABLET ORAL DAILY
Qty: 90 TABLET | Refills: 3 | Status: ON HOLD | OUTPATIENT
Start: 2019-04-09 | End: 2020-02-28 | Stop reason: SDUPTHER

## 2019-04-09 NOTE — PROGRESS NOTES
"Subjective:       Patient ID: Ric Guzmán is a 83 y.o. male.    Chief Complaint: Follow-up and Medication Refill    Follow up    In regards to the patient's hypertension, patient denies chest pain/sob, and has been compliant with the medication regimen.       Review of Systems   Constitutional: Negative for activity change, appetite change, fatigue and fever.   HENT: Negative for congestion, dental problem, ear discharge, hearing loss, postnasal drip, sinus pain, sneezing and trouble swallowing.    Eyes: Positive for visual disturbance. Negative for photophobia and discharge.   Respiratory: Negative for apnea, cough and shortness of breath.    Cardiovascular: Negative for chest pain.   Gastrointestinal: Negative for abdominal distention, abdominal pain, blood in stool, diarrhea, nausea and vomiting.   Endocrine: Negative for cold intolerance.   Genitourinary: Negative for difficulty urinating, flank pain, frequency, hematuria and testicular pain.   Musculoskeletal: Positive for arthralgias, back pain and gait problem. Negative for myalgias.   Skin: Negative for color change.   Allergic/Immunologic: Negative for environmental allergies, food allergies and immunocompromised state.   Neurological: Negative for dizziness, syncope, numbness and headaches.   Hematological: Negative for adenopathy. Does not bruise/bleed easily.   Psychiatric/Behavioral: Negative for agitation, confusion, decreased concentration, hallucinations, self-injury and sleep disturbance.   All other systems reviewed and are negative.        Reviewed family, medical, surgical, and social history.    Objective:      /77 Comment: home value  Pulse 95   Resp 20   Ht 6' 3" (1.905 m)   Wt 113.9 kg (251 lb)   SpO2 96%   BMI 31.37 kg/m²   Physical Exam   Constitutional: He is oriented to person, place, and time. He appears well-developed and well-nourished. No distress.   HENT:   Head: Normocephalic and atraumatic.   Nose: Nose normal. "   Mouth/Throat: Oropharynx is clear and moist. No oropharyngeal exudate.   Eyes: Pupils are equal, round, and reactive to light. Conjunctivae and EOM are normal.   Neck: Normal range of motion. No thyromegaly present.   Cardiovascular: Normal rate, regular rhythm, normal heart sounds and intact distal pulses. Exam reveals no gallop and no friction rub.   No murmur heard.  Pulmonary/Chest: Effort normal and breath sounds normal. No respiratory distress. He has no wheezes. He has no rales.   Abdominal: Soft. He exhibits no distension. There is no tenderness. There is no guarding.   Musculoskeletal: Normal range of motion. He exhibits tenderness and deformity. He exhibits no edema.   Neurological: He is alert and oriented to person, place, and time. He displays normal reflexes. No cranial nerve deficit or sensory deficit. He exhibits normal muscle tone. Coordination normal.   Skin: Skin is warm and dry. No rash noted. He is not diaphoretic. No erythema. No pallor.   Psychiatric: He has a normal mood and affect. His behavior is normal. Judgment and thought content normal.   Nursing note and vitals reviewed.      Assessment:       1. Essential hypertension        Plan:       Essential hypertension  -     lisinopril (PRINIVIL,ZESTRIL) 2.5 MG tablet; Take 1 tablet (2.5 mg total) by mouth once daily.  Dispense: 90 tablet; Refill: 3    Well controlled at home  Will continue current treatment  No cp/sob          Risks, benefits, and side effects were discussed with the patient. All questions were answered to the fullest satisfaction of the patient, and pt verbalized understanding and agreement to treatment plan. Pt was to call with any new or worsening symptoms, or present to the ER.

## 2019-10-23 ENCOUNTER — TELEPHONE (OUTPATIENT)
Dept: FAMILY MEDICINE | Facility: CLINIC | Age: 84
End: 2019-10-23

## 2019-10-23 NOTE — TELEPHONE ENCOUNTER
LVM to return call to discuss appt changes.        ----- Message from Lupe May sent at 10/23/2019 10:47 AM CDT -----  Contact: patient   Type:  Sooner Apoointment Request    Caller is requesting a sooner appointment.  Caller declined first available appointment listed below.  Caller will not accept being placed on the waitlist and is requesting a message be sent to doctor.    Name of Caller:  Patient   When is the first available appointment?  January   Symptoms: check up, sciatic nerve pain   Best Call Back Number: [phone]  Additional Information: pt is wanting to see Dr Enriquez only first part of November

## 2019-10-23 NOTE — TELEPHONE ENCOUNTER
----- Message from Deena Solano sent at 10/23/2019  2:57 PM CDT -----  Contact: patient  Type:  Patient Returning Call    Who Called:  patient  Who Left Message for Patient:  Citlalli  Does the patient know what this is regarding?:  yes  Best Call Back Number:  [phone]  Additional Information:  Requesting a call back

## 2019-10-24 LAB
LEFT EYE DM RETINOPATHY: POSITIVE
RIGHT EYE DM RETINOPATHY: POSITIVE

## 2019-11-25 ENCOUNTER — OFFICE VISIT (OUTPATIENT)
Dept: FAMILY MEDICINE | Facility: CLINIC | Age: 84
End: 2019-11-25
Payer: MEDICARE

## 2019-11-25 VITALS
BODY MASS INDEX: 29.76 KG/M2 | DIASTOLIC BLOOD PRESSURE: 80 MMHG | HEART RATE: 84 BPM | WEIGHT: 239.38 LBS | SYSTOLIC BLOOD PRESSURE: 162 MMHG | HEIGHT: 75 IN | OXYGEN SATURATION: 97 % | RESPIRATION RATE: 20 BRPM

## 2019-11-25 DIAGNOSIS — I10 ESSENTIAL HYPERTENSION: ICD-10-CM

## 2019-11-25 DIAGNOSIS — E11.9 TYPE 2 DIABETES MELLITUS WITHOUT COMPLICATION, WITHOUT LONG-TERM CURRENT USE OF INSULIN: ICD-10-CM

## 2019-11-25 DIAGNOSIS — M54.16 LUMBAR RADICULITIS: Primary | ICD-10-CM

## 2019-11-25 PROCEDURE — 99999 PR PBB SHADOW E&M-EST. PATIENT-LVL III: ICD-10-PCS | Mod: PBBFAC,,, | Performed by: FAMILY MEDICINE

## 2019-11-25 PROCEDURE — 1159F PR MEDICATION LIST DOCUMENTED IN MEDICAL RECORD: ICD-10-PCS | Mod: ,,, | Performed by: FAMILY MEDICINE

## 2019-11-25 PROCEDURE — 99214 OFFICE O/P EST MOD 30 MIN: CPT | Mod: S$PBB,,, | Performed by: FAMILY MEDICINE

## 2019-11-25 PROCEDURE — 1159F MED LIST DOCD IN RCRD: CPT | Mod: ,,, | Performed by: FAMILY MEDICINE

## 2019-11-25 PROCEDURE — 99214 PR OFFICE/OUTPT VISIT, EST, LEVL IV, 30-39 MIN: ICD-10-PCS | Mod: S$PBB,,, | Performed by: FAMILY MEDICINE

## 2019-11-25 PROCEDURE — 99999 PR PBB SHADOW E&M-EST. PATIENT-LVL III: CPT | Mod: PBBFAC,,, | Performed by: FAMILY MEDICINE

## 2019-11-25 PROCEDURE — 99213 OFFICE O/P EST LOW 20 MIN: CPT | Mod: PBBFAC,PN | Performed by: FAMILY MEDICINE

## 2019-11-25 RX ORDER — OXYCODONE AND ACETAMINOPHEN 10; 325 MG/1; MG/1
1 TABLET ORAL EVERY 8 HOURS PRN
Qty: 21 TABLET | Refills: 0 | Status: SHIPPED | OUTPATIENT
Start: 2019-11-25 | End: 2019-12-02 | Stop reason: SDUPTHER

## 2019-11-25 RX ORDER — GLIMEPIRIDE 2 MG/1
2 TABLET ORAL DAILY
COMMUNITY
Start: 2019-11-12

## 2019-11-25 NOTE — PROGRESS NOTES
Subjective:       Patient ID: Ric Guzmán is a 84 y.o. male.    Chief Complaint: Follow-up (Eye exam completed by Dr. Goodman) and Sciatica    In regard to the patient's diabetes, patient reports that blood sugars have been well controlled. Patient Denies hypoglycemia. Patient is not currently on insulin therapy. Patient is on ACE/ARB and is on statin. Last a1c was Hemoglobin A1C       Date                     Value               Ref Range           Status                01/15/2019               6.9 (H)             4.5 - 6.2 %         Final              Comment:    According to ADA guidelines, hemoglobin A1C <7.0% represents  optimal control in non-pregnant diabetic patients.  Different  metrics may apply to specific populations.   Standards of Medical Care in Diabetes - 2016.  For the purpose of screening for the presence of diabetes:  <5.7%     Consistent with the absence of diabetes  5.7-6.4%  Consistent with increasing risk for diabetes   (prediabetes)  >or=6.5%  Consistent with diabetes  Currently no consensus exists for use of hemoglobin A1C  for diagnosis of diabetes for children.    ----------    In regards to the patient's hypertension, patient denies chest pain/sob, and reports compliance with medication regimen.    Pt states that sciatica is well controlled  No issues/side effects  Compliant with treatment regimen      Review of Systems   Constitutional: Negative for activity change, appetite change, fatigue and fever.   HENT: Negative for congestion, dental problem, ear discharge, hearing loss, postnasal drip, sinus pain, sneezing and trouble swallowing.    Eyes: Negative for photophobia and discharge.   Respiratory: Negative for apnea, cough and shortness of breath.    Cardiovascular: Negative for chest pain.   Gastrointestinal: Negative for abdominal distention, abdominal pain, blood in stool, diarrhea, nausea and vomiting.   Endocrine: Negative for cold intolerance.   Genitourinary: Negative  "for difficulty urinating, flank pain, frequency, hematuria and testicular pain.   Musculoskeletal: Positive for arthralgias, back pain, gait problem and myalgias.   Skin: Negative for color change.   Allergic/Immunologic: Negative for environmental allergies, food allergies and immunocompromised state.   Neurological: Negative for dizziness, syncope, numbness and headaches.   Hematological: Negative for adenopathy. Does not bruise/bleed easily.   Psychiatric/Behavioral: Negative for agitation, confusion, decreased concentration, hallucinations, self-injury and sleep disturbance.   All other systems reviewed and are negative.        Reviewed family, medical, surgical, and social history.    Objective:      BP (!) 162/80 (BP Location: Left arm, Patient Position: Sitting, BP Method: Medium (Automatic))   Pulse 84   Resp 20   Ht 6' 3" (1.905 m)   Wt 108.6 kg (239 lb 6.4 oz)   SpO2 97%   BMI 29.92 kg/m²   Physical Exam   Constitutional: He is oriented to person, place, and time. He appears well-developed and well-nourished. No distress.   HENT:   Head: Normocephalic and atraumatic.   Nose: Nose normal.   Mouth/Throat: Oropharynx is clear and moist. No oropharyngeal exudate.   Eyes: Pupils are equal, round, and reactive to light. Conjunctivae and EOM are normal.   Neck: Normal range of motion. No thyromegaly present.   Cardiovascular: Normal rate, regular rhythm, normal heart sounds and intact distal pulses. Exam reveals no gallop and no friction rub.   No murmur heard.  Pulmonary/Chest: Effort normal and breath sounds normal. No respiratory distress. He has no wheezes. He has no rales.   Abdominal: Soft. He exhibits no distension. There is no tenderness. There is no guarding.   Musculoskeletal: Normal range of motion. He exhibits tenderness and deformity. He exhibits no edema.   Neurological: He is alert and oriented to person, place, and time. He displays normal reflexes. No cranial nerve deficit or sensory " deficit. He exhibits normal muscle tone. Coordination normal.   Skin: Skin is warm and dry. No rash noted. He is not diaphoretic. No erythema. No pallor.   Psychiatric: He has a normal mood and affect. His behavior is normal. Judgment and thought content normal.   Nursing note and vitals reviewed.      Assessment:       1. Lumbar radiculitis    2. Type 2 diabetes mellitus without complication, without long-term current use of insulin    3. Essential hypertension        Plan:       Lumbar radiculitis  -     oxyCODONE-acetaminophen (PERCOCET)  mg per tablet; Take 1 tablet by mouth every 8 (eight) hours as needed for Pain.  Dispense: 21 tablet; Refill: 0    Type 2 diabetes mellitus without complication, without long-term current use of insulin    Essential hypertension     reviewed  Will prescribe 7 days of medicine as needed for acute flare        Risks, benefits, and side effects were discussed with the patient. All questions were answered to the fullest satisfaction of the patient, and pt verbalized understanding and agreement to treatment plan. Pt was to call with any new or worsening symptoms, or present to the ER.

## 2019-12-02 DIAGNOSIS — M54.16 LUMBAR RADICULITIS: ICD-10-CM

## 2019-12-02 RX ORDER — OXYCODONE AND ACETAMINOPHEN 10; 325 MG/1; MG/1
1 TABLET ORAL EVERY 8 HOURS PRN
Qty: 21 TABLET | Refills: 0 | Status: SHIPPED | OUTPATIENT
Start: 2019-12-02 | End: 2019-12-09 | Stop reason: SDUPTHER

## 2019-12-02 NOTE — TELEPHONE ENCOUNTER
----- Message from Jeffrey Pritchett sent at 12/2/2019 12:59 PM CST -----  Type:  RX Refill Request    Who Called:  Self   Refill or New Rx: refill RX Name and Strength:  Percocet   How is the patient currently taking it? (ex. 1XDay):   Is this a 30 day or 90 day RX:  30 day supply   Preferred Pharm[facility] with phone number[place]  [facility] [place]  Local or Mail Order:  Local Ordering Provider:  Dr Mina Conner Call Back Number:  [phone]   Additional Information: Patient states the rx percocet is not working. Pt still want to get rx refilled. Patient will discuss the matter during an appointment.

## 2019-12-07 RX ORDER — PANTOPRAZOLE SODIUM 40 MG/1
TABLET, DELAYED RELEASE ORAL
Qty: 90 TABLET | Refills: 4 | Status: ON HOLD | OUTPATIENT
Start: 2019-12-07 | End: 2020-02-28 | Stop reason: SDUPTHER

## 2019-12-09 DIAGNOSIS — M54.16 LUMBAR RADICULITIS: ICD-10-CM

## 2019-12-09 RX ORDER — OXYCODONE AND ACETAMINOPHEN 10; 325 MG/1; MG/1
1 TABLET ORAL EVERY 8 HOURS PRN
Qty: 21 TABLET | Refills: 0 | Status: SHIPPED | OUTPATIENT
Start: 2019-12-09 | End: 2019-12-16

## 2019-12-09 NOTE — TELEPHONE ENCOUNTER
----- Message from Venkat Llanes sent at 12/9/2019 10:22 AM CST -----  Contact: sme  Patient called in and is requesting a refill on his Rx for:    oxyCODONE-acetaminophen (PERCOCET)  mg per tablet 21 tablets with 0 refills last filled on 12/2/2019 12/9/2019 No  Sig - Route: Take 1 tablet by mouth every 8 (eight) hours as needed for Pain. - Oral    [facility] [address] [place] [address], [place] [address] [facility] [address]  [place] [address]  Phone: [phone] Fax: [phone]      Patient call back number is [phone]

## 2019-12-18 DIAGNOSIS — M54.16 LUMBAR RADICULITIS: ICD-10-CM

## 2019-12-18 NOTE — TELEPHONE ENCOUNTER
----- Message from Gary Monaco sent at 12/18/2019 10:03 AM CST -----  Contact: Patient  Type:  RX Refill Request    Who Called:  Patient  Refill or New Rx:  Refill  RX Name and Strength:  oxyCODONE-acetaminophen (PERCOCET)  mg per tablet  How is the patient currently taking it? (ex. 1XDay):  As ordered  Is this a 30 day or 90 day RX:  30  Preferred Pharmacy with phone number:    [facility] [address] [address]  Phone: [phone] Fax: [phone]  Local or Mail Order:  Mail Order  Ordering Provider:  Dr. Mina Conner Call Back Number:  [phone]  Additional Information:  Patient would like to speak to the office. Please call to advise. Thanks!

## 2019-12-19 RX ORDER — OXYCODONE AND ACETAMINOPHEN 10; 325 MG/1; MG/1
1 TABLET ORAL EVERY 8 HOURS PRN
Qty: 21 TABLET | Refills: 0 | Status: SHIPPED | OUTPATIENT
Start: 2019-12-19 | End: 2019-12-19 | Stop reason: SDUPTHER

## 2019-12-19 RX ORDER — OXYCODONE AND ACETAMINOPHEN 10; 325 MG/1; MG/1
1 TABLET ORAL EVERY 8 HOURS PRN
Qty: 21 TABLET | Refills: 0 | Status: SHIPPED | OUTPATIENT
Start: 2019-12-19 | End: 2019-12-23 | Stop reason: SDUPTHER

## 2019-12-23 ENCOUNTER — TELEPHONE (OUTPATIENT)
Dept: FAMILY MEDICINE | Facility: CLINIC | Age: 84
End: 2019-12-23

## 2019-12-23 DIAGNOSIS — M54.16 LUMBAR RADICULITIS: ICD-10-CM

## 2019-12-23 RX ORDER — OXYCODONE AND ACETAMINOPHEN 10; 325 MG/1; MG/1
1 TABLET ORAL EVERY 8 HOURS PRN
Qty: 21 TABLET | Refills: 0 | Status: SHIPPED | OUTPATIENT
Start: 2019-12-23 | End: 2019-12-23 | Stop reason: SDUPTHER

## 2019-12-23 RX ORDER — OXYCODONE AND ACETAMINOPHEN 10; 325 MG/1; MG/1
1 TABLET ORAL EVERY 8 HOURS PRN
Qty: 21 TABLET | Refills: 0 | Status: SHIPPED | OUTPATIENT
Start: 2019-12-23 | End: 2019-12-30

## 2019-12-23 NOTE — TELEPHONE ENCOUNTER
Informed of escribe fail & that prescription is available for pick-up at the office at their convenience. Verbalized understanding.

## 2019-12-23 NOTE — TELEPHONE ENCOUNTER
----- Message from Ashley Raya sent at 12/23/2019  9:11 AM CST -----  Contact: pt  Type: Needs Medical Advice    Who Called:      Best Call Back Number:     Additional Information: Requesting a call back from Nurse, regarding Rx oxyCODONE-acetaminophen (PERCOCET)  mg per tablet pharmacy states they still don't have Rx  It show it was called in on 12/19 pharmacy asked for it to be send back over,please call in Today

## 2020-02-10 ENCOUNTER — PATIENT OUTREACH (OUTPATIENT)
Dept: ADMINISTRATIVE | Facility: HOSPITAL | Age: 85
End: 2020-02-10

## 2020-02-10 NOTE — LETTER
February 10, 2020    Ric Juan MarieKita  [address] [place] [address]             [facility] [address] [facility] [address]  Phone: [phone] Dear Jay [facility] is committed to your overall health and would like to ensure that you are up to date on your recommended test and/or procedures.   Dave Enriquez MD  has found that your chart shows you may be due for the following:    Health Maintenance Due   Topic Date Due    Foot Exam  08/26/1945    TETANUS VACCINE  08/26/1954    Shingles Vaccine (1 of 2) 08/26/1986    Pneumococcal Vaccine (65+ Low/Medium Risk) (1 of 2 - PCV13) 08/26/2001    Hemoglobin A1c  07/15/2019    Influenza Vaccine (1) 09/01/2019    Lipid Panel  01/15/2020     If you have had any of the above done at another facility, please let us know so that we may obtain copies from that facility.  If you have a copy of these records, please provide a copy for us to scan into your chart.  You are welcome to request that the report be faxed to us at  ([phone]).     Otherwise, please schedule these appointments at your earliest convenience by calling [phone] or going to [email or web address].    If you have an upcoming scheduled appointment for the item above, please disregard this letter.    Sincerely,  Your [facility]  MD Katrina Romero L.P.N. Clinical Care Coordinator  [address] [place] [address], [place] [address] [phone]

## 2020-02-24 ENCOUNTER — OFFICE VISIT (OUTPATIENT)
Dept: FAMILY MEDICINE | Facility: CLINIC | Age: 85
End: 2020-02-24
Payer: MEDICARE

## 2020-02-24 VITALS
WEIGHT: 244.13 LBS | OXYGEN SATURATION: 96 % | HEIGHT: 75 IN | SYSTOLIC BLOOD PRESSURE: 197 MMHG | DIASTOLIC BLOOD PRESSURE: 84 MMHG | BODY MASS INDEX: 30.36 KG/M2 | HEART RATE: 78 BPM | RESPIRATION RATE: 20 BRPM

## 2020-02-24 DIAGNOSIS — E11.9 DIABETES MELLITUS WITHOUT COMPLICATION: Primary | ICD-10-CM

## 2020-02-24 DIAGNOSIS — R29.898 WEAKNESS OF BOTH LOWER EXTREMITIES: ICD-10-CM

## 2020-02-24 DIAGNOSIS — M47.896 OTHER SPONDYLOSIS, LUMBAR REGION: ICD-10-CM

## 2020-02-24 PROCEDURE — 99213 OFFICE O/P EST LOW 20 MIN: CPT | Mod: S$PBB,,, | Performed by: FAMILY MEDICINE

## 2020-02-24 PROCEDURE — 99999 PR PBB SHADOW E&M-EST. PATIENT-LVL III: ICD-10-PCS | Mod: PBBFAC,,, | Performed by: FAMILY MEDICINE

## 2020-02-24 PROCEDURE — 99999 PR PBB SHADOW E&M-EST. PATIENT-LVL III: CPT | Mod: PBBFAC,,, | Performed by: FAMILY MEDICINE

## 2020-02-24 PROCEDURE — 99213 OFFICE O/P EST LOW 20 MIN: CPT | Mod: PBBFAC,PN | Performed by: FAMILY MEDICINE

## 2020-02-24 PROCEDURE — 99213 PR OFFICE/OUTPT VISIT, EST, LEVL III, 20-29 MIN: ICD-10-PCS | Mod: S$PBB,,, | Performed by: FAMILY MEDICINE

## 2020-02-24 RX ORDER — OXYCODONE AND ACETAMINOPHEN 10; 325 MG/1; MG/1
TABLET ORAL
COMMUNITY
Start: 2019-12-30 | End: 2020-02-24

## 2020-02-24 NOTE — PROGRESS NOTES
Subjective:       Patient ID: Ric Guzmán is a 84 y.o. male.    Chief Complaint: Follow-up (Pt refused vaccinations & foot exam )    In regard to the patient's diabetes, patient reports that blood sugars have been well controlled. Patient Denies hypoglycemia. Patient is not currently on insulin therapy. Patient is on ACE/ARB and is on statin. Last a1c was Hemoglobin A1C       Date                     Value               Ref Range           Status                01/15/2019               6.9 (H)             4.5 - 6.2 %         Final              Comment:    According to ADA guidelines, hemoglobin A1C <7.0% represents  optimal control in non-pregnant diabetic patients.  Different  metrics may apply to specific populations.   Standards of Medical Care in Diabetes - 2016.  For the purpose of screening for the presence of diabetes:  <5.7%     Consistent with the absence of diabetes  5.7-6.4%  Consistent with increasing risk for diabetes   (prediabetes)  >or=6.5%  Consistent with diabetes  Currently no consensus exists for use of hemoglobin A1C  for diagnosis of diabetes for children.    ----------      Review of Systems   Constitutional: Negative for activity change, appetite change, fatigue and fever.   HENT: Negative for congestion, dental problem, ear discharge, hearing loss, postnasal drip, sinus pain, sneezing and trouble swallowing.    Eyes: Negative for photophobia and discharge.   Respiratory: Negative for apnea, cough and shortness of breath.    Cardiovascular: Negative for chest pain.   Gastrointestinal: Negative for abdominal distention, abdominal pain, blood in stool, diarrhea, nausea and vomiting.   Endocrine: Negative for cold intolerance.   Genitourinary: Negative for difficulty urinating, flank pain, frequency, hematuria and testicular pain.   Musculoskeletal: Positive for arthralgias, back pain, gait problem and myalgias.   Skin: Negative for color change.   Allergic/Immunologic: Negative for  "environmental allergies, food allergies and immunocompromised state.   Neurological: Negative for dizziness, syncope, numbness and headaches.   Hematological: Negative for adenopathy. Does not bruise/bleed easily.   Psychiatric/Behavioral: Negative for agitation, confusion, decreased concentration, hallucinations, self-injury and sleep disturbance.   All other systems reviewed and are negative.        Reviewed family, medical, surgical, and social history.    Objective:      BP (!) 197/84 (BP Location: Right arm, Patient Position: Sitting, BP Method: Medium (Automatic))   Pulse 78   Resp 20   Ht 6' 3" (1.905 m)   Wt 110.7 kg (244 lb 1.6 oz)   SpO2 96%   BMI 30.51 kg/m²   Physical Exam   Constitutional: He is oriented to person, place, and time. He appears well-developed and well-nourished. No distress.   HENT:   Head: Normocephalic and atraumatic.   Nose: Nose normal.   Mouth/Throat: Oropharynx is clear and moist. No oropharyngeal exudate.   Eyes: Pupils are equal, round, and reactive to light. Conjunctivae and EOM are normal.   Neck: Normal range of motion. No thyromegaly present.   Cardiovascular: Normal rate, regular rhythm, normal heart sounds and intact distal pulses. Exam reveals no gallop and no friction rub.   No murmur heard.  Pulmonary/Chest: Effort normal and breath sounds normal. No respiratory distress. He has no wheezes. He has no rales.   Abdominal: Soft. He exhibits no distension. There is no tenderness. There is no guarding.   Musculoskeletal: Normal range of motion. He exhibits tenderness and deformity. He exhibits no edema.   Neurological: He is alert and oriented to person, place, and time. He displays normal reflexes. No cranial nerve deficit or sensory deficit. He exhibits normal muscle tone. Coordination normal.   Skin: Skin is warm and dry. No rash noted. He is not diaphoretic. No erythema. No pallor.   Psychiatric: He has a normal mood and affect. His behavior is normal. Judgment and " thought content normal.   Nursing note and vitals reviewed.      Assessment:       1. Diabetes mellitus without complication    2. Other spondylosis, lumbar region    3. Weakness of both lower extremities        Plan:       Diabetes mellitus without complication  -     Cancel: CBC auto differential; Future; Expected date: 02/24/2020  -     Cancel: Comprehensive metabolic panel; Future; Expected date: 02/24/2020  -     Cancel: Microalbumin/creatinine urine ratio; Future  -     Cancel: Lipid panel; Future; Expected date: 02/24/2020  -     Cancel: TSH; Future; Expected date: 02/24/2020  -     Cancel: T4, free; Future; Expected date: 02/24/2020  -     Cancel: Hemoglobin A1c; Future; Expected date: 02/24/2020    Other spondylosis, lumbar region  -     Cancel: Ambulatory referral/consult to Physical/Occupational Therapy; Future; Expected date: 03/02/2020    Weakness of both lower extremities  -     Cancel: Ambulatory referral/consult to Physical/Occupational Therapy; Future; Expected date: 03/02/2020    Does not want labs, moving to [place]  Would like to be seen next month before his trip to make final preparations.        Risks, benefits, and side effects were discussed with the patient. All questions were answered to the fullest satisfaction of the patient, and pt verbalized understanding and agreement to treatment plan. Pt was to call with any new or worsening symptoms, or present to the ER.

## 2020-02-26 ENCOUNTER — CLINICAL SUPPORT (OUTPATIENT)
Dept: CARDIOLOGY | Facility: HOSPITAL | Age: 85
DRG: 811 | End: 2020-02-26
Attending: HOSPITALIST
Payer: OTHER GOVERNMENT

## 2020-02-26 ENCOUNTER — HOSPITAL ENCOUNTER (INPATIENT)
Facility: HOSPITAL | Age: 85
LOS: 2 days | Discharge: HOME | DRG: 811 | End: 2020-02-28
Attending: INTERNAL MEDICINE | Admitting: HOSPITALIST
Payer: MEDICARE

## 2020-02-26 DIAGNOSIS — I10 ESSENTIAL HYPERTENSION: ICD-10-CM

## 2020-02-26 DIAGNOSIS — R09.02 HYPOXIA: ICD-10-CM

## 2020-02-26 DIAGNOSIS — D50.0 IRON DEFICIENCY ANEMIA DUE TO CHRONIC BLOOD LOSS: Primary | ICD-10-CM

## 2020-02-26 DIAGNOSIS — R07.9 CHEST PAIN: ICD-10-CM

## 2020-02-26 DIAGNOSIS — I50.33 ACUTE ON CHRONIC DIASTOLIC HEART FAILURE: ICD-10-CM

## 2020-02-26 PROBLEM — R06.00 DYSPNEA: Status: ACTIVE | Noted: 2020-02-26

## 2020-02-26 PROBLEM — D50.9 IRON DEFICIENCY ANEMIA: Status: ACTIVE | Noted: 2020-02-26

## 2020-02-26 LAB
ABO + RH BLD: NORMAL
ALBUMIN SERPL BCP-MCNC: 3.6 G/DL (ref 3.5–5.2)
ALP SERPL-CCNC: 59 U/L (ref 55–135)
ALT SERPL W/O P-5'-P-CCNC: 18 U/L (ref 10–44)
ANION GAP SERPL CALC-SCNC: 10 MMOL/L (ref 8–16)
AORTIC ROOT ANNULUS: 4 CM
AORTIC VALVE CUSP SEPERATION: 2.28 CM
AST SERPL-CCNC: 19 U/L (ref 10–40)
AV INDEX (PROSTH): 0.68
AV MEAN GRADIENT: 3 MMHG
AV PEAK GRADIENT: 5 MMHG
AV VALVE AREA: 3.16 CM2
AV VELOCITY RATIO: 71.15
BASOPHILS # BLD AUTO: 0.06 K/UL (ref 0–0.2)
BASOPHILS NFR BLD: 0.5 % (ref 0–1.9)
BILIRUB SERPL-MCNC: 0.8 MG/DL (ref 0.1–1)
BLD GP AB SCN CELLS X3 SERPL QL: NORMAL
BNP SERPL-MCNC: 216 PG/ML (ref 0–99)
BSA FOR ECHO PROCEDURE: 2.35 M2
BUN SERPL-MCNC: 19 MG/DL (ref 8–23)
CALCIUM SERPL-MCNC: 8.5 MG/DL (ref 8.7–10.5)
CHLORIDE SERPL-SCNC: 103 MMOL/L (ref 95–110)
CO2 SERPL-SCNC: 25 MMOL/L (ref 23–29)
CREAT SERPL-MCNC: 1 MG/DL (ref 0.5–1.4)
CV ECHO LV RWT: 0.45 CM
DIFFERENTIAL METHOD: ABNORMAL
DOP CALC AO PEAK VEL: 1.12 M/S
DOP CALC AO VTI: 23.32 CM
DOP CALC LVOT AREA: 4.6 CM2
DOP CALC LVOT DIAMETER: 2.43 CM
DOP CALC LVOT PEAK VEL: 79.69 M/S
DOP CALC LVOT STROKE VOLUME: 73.66 CM3
DOP CALCLVOT PEAK VEL VTI: 15.89 CM
E WAVE DECELERATION TIME: 140.89 MSEC
E/A RATIO: 1.74
E/E' RATIO: 8.61 M/S
ECHO LV POSTERIOR WALL: 1.18 CM (ref 0.6–1.1)
EOSINOPHIL # BLD AUTO: 0.1 K/UL (ref 0–0.5)
EOSINOPHIL NFR BLD: 0.4 % (ref 0–8)
ERYTHROCYTE [DISTWIDTH] IN BLOOD BY AUTOMATED COUNT: 17.2 % (ref 11.5–14.5)
EST. GFR  (AFRICAN AMERICAN): >60 ML/MIN/1.73 M^2
EST. GFR  (NON AFRICAN AMERICAN): >60 ML/MIN/1.73 M^2
FERRITIN SERPL-MCNC: 12 NG/ML (ref 20–300)
FRACTIONAL SHORTENING: 41 % (ref 28–44)
GLUCOSE SERPL-MCNC: 227 MG/DL (ref 70–110)
GLUCOSE SERPL-MCNC: 312 MG/DL (ref 70–110)
GLUCOSE SERPL-MCNC: 413 MG/DL (ref 70–110)
HCT VFR BLD AUTO: 28.1 % (ref 40–54)
HGB BLD-MCNC: 8.4 G/DL (ref 14–18)
IMM GRANULOCYTES # BLD AUTO: 0.11 K/UL (ref 0–0.04)
IMM GRANULOCYTES NFR BLD AUTO: 0.9 % (ref 0–0.5)
INTERVENTRICULAR SEPTUM: 1.18 CM (ref 0.6–1.1)
IRON SERPL-MCNC: 41 UG/DL (ref 45–160)
LEFT ATRIUM SIZE: 3.48 CM
LEFT INTERNAL DIMENSION IN SYSTOLE: 3.09 CM (ref 2.1–4)
LEFT VENTRICLE MASS INDEX: 105 G/M2
LEFT VENTRICULAR INTERNAL DIMENSION IN DIASTOLE: 5.21 CM (ref 3.5–6)
LEFT VENTRICULAR MASS: 243.86 G
LV LATERAL E/E' RATIO: 7.62 M/S
LV SEPTAL E/E' RATIO: 9.9 M/S
LYMPHOCYTES # BLD AUTO: 0.4 K/UL (ref 1–4.8)
LYMPHOCYTES NFR BLD: 3.4 % (ref 18–48)
MCH RBC QN AUTO: 25.6 PG (ref 27–31)
MCHC RBC AUTO-ENTMCNC: 29.9 G/DL (ref 32–36)
MCV RBC AUTO: 86 FL (ref 82–98)
MONOCYTES # BLD AUTO: 1.4 K/UL (ref 0.3–1)
MONOCYTES NFR BLD: 11.7 % (ref 4–15)
MV PEAK A VEL: 0.57 M/S
MV PEAK E VEL: 0.99 M/S
NEUTROPHILS # BLD AUTO: 10.1 K/UL (ref 1.8–7.7)
NEUTROPHILS NFR BLD: 83.1 % (ref 38–73)
NRBC BLD-RTO: 0 /100 WBC
OB PNL STL: NEGATIVE
PISA TR MAX VEL: 3.89 M/S
PLATELET # BLD AUTO: 252 K/UL (ref 150–350)
PMV BLD AUTO: 12 FL (ref 9.2–12.9)
POTASSIUM SERPL-SCNC: 4 MMOL/L (ref 3.5–5.1)
PROT SERPL-MCNC: 7.2 G/DL (ref 6–8.4)
PV PEAK VELOCITY: 109.51 CM/S
RA PRESSURE: 8 MMHG
RBC # BLD AUTO: 3.28 M/UL (ref 4.6–6.2)
SATURATED IRON: 10 % (ref 20–50)
SODIUM SERPL-SCNC: 138 MMOL/L (ref 136–145)
TDI LATERAL: 0.13 M/S
TDI SEPTAL: 0.1 M/S
TDI: 0.12 M/S
TOTAL IRON BINDING CAPACITY: 414 UG/DL (ref 250–450)
TR MAX PG: 61 MMHG
TRANSFERRIN SERPL-MCNC: 296 MG/DL (ref 200–375)
TROPONIN I SERPL DL<=0.01 NG/ML-MCNC: <0.03 NG/ML
TV REST PULMONARY ARTERY PRESSURE: 69 MMHG
WBC # BLD AUTO: 12.16 K/UL (ref 3.9–12.7)

## 2020-02-26 PROCEDURE — 86850 RBC ANTIBODY SCREEN: CPT

## 2020-02-26 PROCEDURE — 80053 COMPREHEN METABOLIC PANEL: CPT

## 2020-02-26 PROCEDURE — 93306 TTE W/DOPPLER COMPLETE: CPT

## 2020-02-26 PROCEDURE — 94640 AIRWAY INHALATION TREATMENT: CPT

## 2020-02-26 PROCEDURE — 93005 ELECTROCARDIOGRAM TRACING: CPT

## 2020-02-26 PROCEDURE — 84484 ASSAY OF TROPONIN QUANT: CPT

## 2020-02-26 PROCEDURE — 82272 OCCULT BLD FECES 1-3 TESTS: CPT

## 2020-02-26 PROCEDURE — 25000242 PHARM REV CODE 250 ALT 637 W/ HCPCS: Performed by: HOSPITALIST

## 2020-02-26 PROCEDURE — 63600175 PHARM REV CODE 636 W HCPCS: Performed by: HOSPITALIST

## 2020-02-26 PROCEDURE — 21400001 HC TELEMETRY ROOM

## 2020-02-26 PROCEDURE — P9016 RBC LEUKOCYTES REDUCED: HCPCS

## 2020-02-26 PROCEDURE — 25000003 PHARM REV CODE 250: Performed by: HOSPITALIST

## 2020-02-26 PROCEDURE — 86920 COMPATIBILITY TEST SPIN: CPT

## 2020-02-26 PROCEDURE — 83880 ASSAY OF NATRIURETIC PEPTIDE: CPT

## 2020-02-26 PROCEDURE — 36415 COLL VENOUS BLD VENIPUNCTURE: CPT

## 2020-02-26 PROCEDURE — 82728 ASSAY OF FERRITIN: CPT

## 2020-02-26 PROCEDURE — 63600175 PHARM REV CODE 636 W HCPCS: Performed by: EMERGENCY MEDICINE

## 2020-02-26 PROCEDURE — 99900035 HC TECH TIME PER 15 MIN (STAT)

## 2020-02-26 PROCEDURE — 27000221 HC OXYGEN, UP TO 24 HOURS

## 2020-02-26 PROCEDURE — 85025 COMPLETE CBC W/AUTO DIFF WBC: CPT

## 2020-02-26 PROCEDURE — 94761 N-INVAS EAR/PLS OXIMETRY MLT: CPT

## 2020-02-26 PROCEDURE — 25500020 PHARM REV CODE 255: Performed by: EMERGENCY MEDICINE

## 2020-02-26 PROCEDURE — 83036 HEMOGLOBIN GLYCOSYLATED A1C: CPT

## 2020-02-26 PROCEDURE — 83540 ASSAY OF IRON: CPT

## 2020-02-26 PROCEDURE — 36430 TRANSFUSION BLD/BLD COMPNT: CPT

## 2020-02-26 PROCEDURE — 99285 EMERGENCY DEPT VISIT HI MDM: CPT | Mod: 25

## 2020-02-26 PROCEDURE — 96374 THER/PROPH/DIAG INJ IV PUSH: CPT

## 2020-02-26 PROCEDURE — 25000242 PHARM REV CODE 250 ALT 637 W/ HCPCS: Performed by: EMERGENCY MEDICINE

## 2020-02-26 RX ORDER — METHYLPREDNISOLONE SOD SUCC 125 MG
125 VIAL (EA) INJECTION
Status: COMPLETED | OUTPATIENT
Start: 2020-02-26 | End: 2020-02-26

## 2020-02-26 RX ORDER — ACETAMINOPHEN 325 MG/1
650 TABLET ORAL EVERY 4 HOURS PRN
Status: DISCONTINUED | OUTPATIENT
Start: 2020-02-26 | End: 2020-02-28 | Stop reason: HOSPADM

## 2020-02-26 RX ORDER — POTASSIUM CHLORIDE 20 MEQ/15ML
40 SOLUTION ORAL
Status: DISCONTINUED | OUTPATIENT
Start: 2020-02-26 | End: 2020-02-28 | Stop reason: HOSPADM

## 2020-02-26 RX ORDER — SODIUM,POTASSIUM PHOSPHATES 280-250MG
2 POWDER IN PACKET (EA) ORAL
Status: DISCONTINUED | OUTPATIENT
Start: 2020-02-26 | End: 2020-02-28 | Stop reason: HOSPADM

## 2020-02-26 RX ORDER — LABETALOL HYDROCHLORIDE 5 MG/ML
INJECTION, SOLUTION INTRAVENOUS
Status: DISPENSED
Start: 2020-02-26 | End: 2020-02-27

## 2020-02-26 RX ORDER — HYDROCODONE BITARTRATE AND ACETAMINOPHEN 5; 325 MG/1; MG/1
1 TABLET ORAL EVERY 6 HOURS PRN
Status: DISCONTINUED | OUTPATIENT
Start: 2020-02-26 | End: 2020-02-28 | Stop reason: HOSPADM

## 2020-02-26 RX ORDER — HYDRALAZINE HYDROCHLORIDE 20 MG/ML
10 INJECTION INTRAMUSCULAR; INTRAVENOUS
Status: COMPLETED | OUTPATIENT
Start: 2020-02-26 | End: 2020-02-26

## 2020-02-26 RX ORDER — INSULIN ASPART 100 [IU]/ML
1-10 INJECTION, SOLUTION INTRAVENOUS; SUBCUTANEOUS
Status: DISCONTINUED | OUTPATIENT
Start: 2020-02-26 | End: 2020-02-28 | Stop reason: HOSPADM

## 2020-02-26 RX ORDER — ASPIRIN 81 MG/1
81 TABLET ORAL DAILY
Status: DISCONTINUED | OUTPATIENT
Start: 2020-02-26 | End: 2020-02-28 | Stop reason: HOSPADM

## 2020-02-26 RX ORDER — LABETALOL HYDROCHLORIDE 5 MG/ML
10 INJECTION, SOLUTION INTRAVENOUS ONCE
Status: COMPLETED | OUTPATIENT
Start: 2020-02-26 | End: 2020-02-26

## 2020-02-26 RX ORDER — PANTOPRAZOLE SODIUM 40 MG/1
40 TABLET, DELAYED RELEASE ORAL DAILY
Status: DISCONTINUED | OUTPATIENT
Start: 2020-02-26 | End: 2020-02-28

## 2020-02-26 RX ORDER — SODIUM CHLORIDE 0.9 % (FLUSH) 0.9 %
10 SYRINGE (ML) INJECTION
Status: DISCONTINUED | OUTPATIENT
Start: 2020-02-26 | End: 2020-02-28 | Stop reason: HOSPADM

## 2020-02-26 RX ORDER — LANOLIN ALCOHOL/MO/W.PET/CERES
800 CREAM (GRAM) TOPICAL
Status: DISCONTINUED | OUTPATIENT
Start: 2020-02-26 | End: 2020-02-28 | Stop reason: HOSPADM

## 2020-02-26 RX ORDER — IBUPROFEN 200 MG
16 TABLET ORAL
Status: DISCONTINUED | OUTPATIENT
Start: 2020-02-26 | End: 2020-02-28 | Stop reason: HOSPADM

## 2020-02-26 RX ORDER — IPRATROPIUM BROMIDE AND ALBUTEROL SULFATE 2.5; .5 MG/3ML; MG/3ML
3 SOLUTION RESPIRATORY (INHALATION) EVERY 6 HOURS
Status: DISCONTINUED | OUTPATIENT
Start: 2020-02-26 | End: 2020-02-27

## 2020-02-26 RX ORDER — HYDROCODONE BITARTRATE AND ACETAMINOPHEN 500; 5 MG/1; MG/1
TABLET ORAL
Status: DISCONTINUED | OUTPATIENT
Start: 2020-02-26 | End: 2020-02-28 | Stop reason: HOSPADM

## 2020-02-26 RX ORDER — TALC
6 POWDER (GRAM) TOPICAL NIGHTLY PRN
Status: DISCONTINUED | OUTPATIENT
Start: 2020-02-26 | End: 2020-02-28 | Stop reason: HOSPADM

## 2020-02-26 RX ORDER — FUROSEMIDE 10 MG/ML
20 INJECTION INTRAMUSCULAR; INTRAVENOUS ONCE
Status: COMPLETED | OUTPATIENT
Start: 2020-02-26 | End: 2020-02-26

## 2020-02-26 RX ORDER — IBUPROFEN 200 MG
24 TABLET ORAL
Status: DISCONTINUED | OUTPATIENT
Start: 2020-02-26 | End: 2020-02-28 | Stop reason: HOSPADM

## 2020-02-26 RX ORDER — GLUCAGON 1 MG
1 KIT INJECTION
Status: DISCONTINUED | OUTPATIENT
Start: 2020-02-26 | End: 2020-02-28 | Stop reason: HOSPADM

## 2020-02-26 RX ORDER — LISINOPRIL 2.5 MG/1
2.5 TABLET ORAL DAILY
Status: DISCONTINUED | OUTPATIENT
Start: 2020-02-26 | End: 2020-02-26

## 2020-02-26 RX ORDER — IPRATROPIUM BROMIDE AND ALBUTEROL SULFATE 2.5; .5 MG/3ML; MG/3ML
3 SOLUTION RESPIRATORY (INHALATION)
Status: COMPLETED | OUTPATIENT
Start: 2020-02-26 | End: 2020-02-26

## 2020-02-26 RX ORDER — POTASSIUM CHLORIDE 20 MEQ/15ML
60 SOLUTION ORAL
Status: DISCONTINUED | OUTPATIENT
Start: 2020-02-26 | End: 2020-02-28 | Stop reason: HOSPADM

## 2020-02-26 RX ORDER — METOPROLOL TARTRATE 25 MG/1
25 TABLET, FILM COATED ORAL 2 TIMES DAILY
Status: DISCONTINUED | OUTPATIENT
Start: 2020-02-26 | End: 2020-02-28 | Stop reason: HOSPADM

## 2020-02-26 RX ORDER — ONDANSETRON 2 MG/ML
4 INJECTION INTRAMUSCULAR; INTRAVENOUS EVERY 8 HOURS PRN
Status: DISCONTINUED | OUTPATIENT
Start: 2020-02-26 | End: 2020-02-28 | Stop reason: HOSPADM

## 2020-02-26 RX ORDER — LISINOPRIL 5 MG/1
5 TABLET ORAL DAILY
Status: DISCONTINUED | OUTPATIENT
Start: 2020-02-27 | End: 2020-02-28

## 2020-02-26 RX ADMIN — IOHEXOL 100 ML: 350 INJECTION, SOLUTION INTRAVENOUS at 11:02

## 2020-02-26 RX ADMIN — METOPROLOL TARTRATE 25 MG: 25 TABLET ORAL at 09:02

## 2020-02-26 RX ADMIN — FUROSEMIDE 20 MG: 10 INJECTION, SOLUTION INTRAMUSCULAR; INTRAVENOUS at 03:02

## 2020-02-26 RX ADMIN — PANTOPRAZOLE SODIUM 40 MG: 40 TABLET, DELAYED RELEASE ORAL at 03:02

## 2020-02-26 RX ADMIN — INSULIN ASPART 10 UNITS: 100 INJECTION, SOLUTION INTRAVENOUS; SUBCUTANEOUS at 05:02

## 2020-02-26 RX ADMIN — HYDRALAZINE HYDROCHLORIDE 10 MG: 20 INJECTION INTRAMUSCULAR; INTRAVENOUS at 12:02

## 2020-02-26 RX ADMIN — LISINOPRIL 2.5 MG: 2.5 TABLET ORAL at 03:02

## 2020-02-26 RX ADMIN — IPRATROPIUM BROMIDE AND ALBUTEROL SULFATE 3 ML: .5; 3 SOLUTION RESPIRATORY (INHALATION) at 11:02

## 2020-02-26 RX ADMIN — ASPIRIN 81 MG: 81 TABLET, DELAYED RELEASE ORAL at 03:02

## 2020-02-26 RX ADMIN — RIVAROXABAN 20 MG: 20 TABLET, FILM COATED ORAL at 09:02

## 2020-02-26 RX ADMIN — METHYLPREDNISOLONE SODIUM SUCCINATE 125 MG: 125 INJECTION, POWDER, FOR SOLUTION INTRAMUSCULAR; INTRAVENOUS at 10:02

## 2020-02-26 RX ADMIN — IPRATROPIUM BROMIDE AND ALBUTEROL SULFATE 3 ML: .5; 3 SOLUTION RESPIRATORY (INHALATION) at 06:02

## 2020-02-26 RX ADMIN — INSULIN ASPART 4 UNITS: 100 INJECTION, SOLUTION INTRAVENOUS; SUBCUTANEOUS at 09:02

## 2020-02-26 RX ADMIN — LABETALOL HYDROCHLORIDE 10 MG: 5 INJECTION, SOLUTION INTRAVENOUS at 03:02

## 2020-02-26 NOTE — CONSULTS
[facility]  Cardiology  Consult Note    Patient Name: Ric Guzmán  MRN: [id number]  Admission Date: 2/26/2020  Hospital Length of Stay: 0 days  Code Status: Full Code   Attending Provider: Jules Owens MD   Consulting Provider: Morgan Bentley MD  Primary Care Physician: Dave Enriquez MD  Principal Problem:Dyspnea    Patient information was obtained from patient and ER records.     Consults  Subjective:     REASON FOR CONSULT:       HPI: 84-year-old gentleman with past medical history of hypertension, and IDDM, DVT status post IVC filter on Xarelto, anal cancer in remission presented with chief complaint of dyspnea.  Patient reportedly was in his usual state of health until couple of days ago when he started experiencing dyspnea on exertion, feeling very fatigued and low energy.  Last night he went to bed being very fatigued and woke up in the middle of the night with severe shortness of breath and could not sleep and finally decided to come to ED.  He also reports chest discomfort. He describes it as a heaviness sensation.      In ED, patient was hypoxic with oxygen saturation in 80s while sitting and his blood pressure was very elevated.  CTA chest ruled out pulmonary embolism.        Past Medical History:   Diagnosis Date    Allergy     Anticoagulant long-term use     Arthritis     Cancer     Cataract     CHF (congestive heart failure)     Clotting disorder     Diabetes mellitus, type 2     Encounter for blood transfusion     Hypertension     Iron deficiency anemia 2/26/2020    Lumbar radiculitis 12/10/2018    Ulcer        Past Surgical History:   Procedure Laterality Date    CAUDAL EPIDURAL STEROID INJECTION N/A 12/10/2018    Procedure: Injection-steroid-epidural-caudal;  Surgeon: Joe Matos MD;  Location: [facility] OR;  Service: Pain Management;  Laterality: N/A;  caudal CIARA     COLON SURGERY      EYE SURGERY      FRACTURE SURGERY      HERNIA REPAIR       INJECTION OF ANESTHETIC AGENT AROUND LATERAL BRANCH NERVES OF SACROILIAC JOINT Bilateral 1/17/2019    Procedure: BLOCK, NERVE, SACROILIAC JOINT, LATERAL BRANCH;  Surgeon: Joe Matos MD;  Location: [facility] OR;  Service: Pain Management;  Laterality: Bilateral;  bilateral SI joint injection    JOINT REPLACEMENT      RADIOFREQUENCY ABLATION Left 3/1/2019    Procedure: Radiofrequency Ablation;  Surgeon: Joe Matos MD;  Location: [facility] OR;  Service: Pain Management;  Laterality: Left;  L5, S1, S2    SPINE SURGERY      TONSILLECTOMY      VASECTOMY         Review of patient's allergies indicates:   Allergen Reactions    Cymbalta [duloxetine]      Passes out    Nsaids (non-steroidal anti-inflammatory drug)      Other reaction(s): gi distress  Previous ulcer       No current facility-administered medications on file prior to encounter.      Current Outpatient Medications on File Prior to Encounter   Medication Sig    glimepiride (AMARYL) 2 MG tablet Take 2 mg by mouth once daily.     JANUVIA 100 mg Tab Take 100 mg by mouth once daily.     lisinopril (PRINIVIL,ZESTRIL) 2.5 MG tablet Take 1 tablet (2.5 mg total) by mouth once daily.    pantoprazole (PROTONIX) 40 MG tablet TAKE 1 TABLET DAILY (Patient taking differently: Take 40 mg by mouth once daily. )    vit A/C/E ac/ZnOx/cupric oxide (EYE VITAMIN AND MINERALS ORAL) Take 1 capsule by mouth once daily.     XARELTO 20 mg Tab TAKE 1 TABLET DAILY (Patient taking differently: Take 20 mg by mouth every evening. )    [DISCONTINUED] FREESTYLE LITE STRIPS Strp     [DISCONTINUED] multivitamin capsule Take 1 capsule by mouth once daily.    [DISCONTINUED] SANTYL ointment        Scheduled Meds:   albuterol-ipratropium  3 mL Nebulization Q6H    aspirin  81 mg Oral Daily    labetalol        [START ON 2/27/2020] lisinopril  5 mg Oral Daily    metoprolol tartrate  25 mg Oral BID    pantoprazole  40 mg Oral Daily    rivaroxaban  20 mg Oral QHS     Continuous  Infusions:  PRN Meds:.sodium chloride, acetaminophen, dextrose 50%, dextrose 50%, glucagon (human recombinant), glucose, glucose, HYDROcodone-acetaminophen, insulin aspart U-100, magnesium oxide, magnesium oxide, melatonin, ondansetron, potassium chloride 10%, potassium chloride 10%, potassium chloride 10%, potassium, sodium phosphates, potassium, sodium phosphates, potassium, sodium phosphates, sodium chloride 0.9%    Family History     None          Tobacco Use    Smoking status: Former Smoker    Smokeless tobacco: Former User   Substance and Sexual Activity    Alcohol use: Not Currently     Alcohol/week: 4.0 standard drinks     Types: 4 Cans of beer per week    Drug use: No    Sexual activity: Never       ROS   No significant headaches or sore throat or runny nose.   No recent changes in vision.   No recent changes in hearing.  No dysphagia or odynophagia.  Reports chest pain and shortness of breath.   Denies any cough or hemoptysis.   Denies any abdominal pain, nausea, vomiting, diarrhea or constipation.   Denies any dysuria or polyuria.   Denies any fevers or chills.   Denies any recent significant weight changes.   Denies bleeding diathesis    Objective:     Vital Signs (Most Recent):  Temp: 98.7 °F (37.1 °C) (02/26/20 1719)  Pulse: 89 (02/26/20 1719)  Resp: 18 (02/26/20 1719)  BP: (!) 148/63 (02/26/20 1719)  SpO2: 95 % (02/26/20 1719) Vital Signs (24h Range):  Temp:  [98.3 °F (36.8 °C)-99.1 °F (37.3 °C)] 98.7 °F (37.1 °C)  Pulse:  [] 89  Resp:  [18-24] 18  SpO2:  [87 %-100 %] 95 %  BP: (148-202)/(63-87) 148/63     Weight: 107.6 kg (237 lb 3.4 oz)  Body mass index is 29.65 kg/m².    SpO2: 95 %  O2 Device (Oxygen Therapy): nasal cannula      Intake/Output Summary (Last 24 hours) at 2/26/2020 1729  Last data filed at 2/26/2020 1721  Gross per 24 hour   Intake --   Output 1150 ml   Net -1150 ml       Lines/Drains/Airways     Peripheral Intravenous Line                 Peripheral IV - Single Lumen  02/26/20 0825 18 G Left Forearm less than 1 day         Peripheral IV - Single Lumen 02/26/20 1437 20 G Right Antecubital less than 1 day                Physical Exam  HEENT: Normocephalic, atraumatic, PERRL, Conjunctiva pink, no scleral icterus.   CVS: S1S2+, RRR, no murmurs, rubs or gallops, JVP: Normal.  LUNGS: Clear  ABDOMEN: Soft, NT, BS+  EXTREMITIES: No cyanosis, clubbing or edema  NEURO: AAO X 3.     Significant Labs:   BMP:   Recent Labs   Lab 02/26/20  0906   *      K 4.0      CO2 25   BUN 19   CREATININE 1.0   CALCIUM 8.5*   , CMP   Recent Labs   Lab 02/26/20  0906      K 4.0      CO2 25   *   BUN 19   CREATININE 1.0   CALCIUM 8.5*   PROT 7.2   ALBUMIN 3.6   BILITOT 0.8   ALKPHOS 59   AST 19   ALT 18   ANIONGAP 10   ESTGFRAFRICA >60.0   EGFRNONAA >60.0   , CBC   Recent Labs   Lab 02/26/20  0906   WBC 12.16   HGB 8.4*   HCT 28.1*      , INR No results for input(s): INR, PROTIME in the last 48 hours., Lipid Panel No results for input(s): CHOL, HDL, LDLCALC, TRIG, CHOLHDL in the last 48 hours. and Troponin   Recent Labs   Lab 02/26/20 0906   TROPONINI <0.030       Significant Imaging: Reviewed  Assessment and Plan:     IMPRESSION:  CHF. Preserved LVEF. Patient received Lasix and is diuresing well. Feels better.   Atypical chest pain.   Acute on chronic anemia. pRBC transfuion underway.   H/o Factor V Leiden deficieny.  H/o DVT/PE s/p IVC filter placement.   DM.     RECOMMENDATIONS:  1. Consider GI Consult,   2. Repeat troponin in AM.   3. Continue current regimen.   4. Discussed with Dr Owens.     Thank you for your consult. I will follow-up with patient. Please contact us if you have any additional questions.    Morgan Bentley MD  Cardiology   [facility]

## 2020-02-26 NOTE — ASSESSMENT & PLAN NOTE
CTA chest showed multivessel coronary calcification  Likely precipitated by symptomatic anemia  Serial EKGs, cardiac enzymes, telemetry monitoring, 2D echo  Lipid profile in morning  Aspirin, statin  Consult Cardiology  P.r.n. nitroglycerin

## 2020-02-26 NOTE — ASSESSMENT & PLAN NOTE
Likely due to symptomatic anemia however CTA chest showed cardiomegaly, coronary calcifications  Undiagnosed CHF-unknown type  CT chest ruled out any pulmonary embolism  Point of care EKG and cardiac enzymes are not impressive for any ischemic event  Transfuse 1 unit PRBC  Blood pressure control  20 mg IV Lasix  2D echo  Serial EKGs, cardiac enzymes, telemetry monitoring  Check for occult blood  Consult Cardiology

## 2020-02-26 NOTE — ASSESSMENT & PLAN NOTE
Pt denies hemetemesis/Melena however he has significant history of perforated GI ulcer requiring surgery.  Of note patient is on Xarelto  Iron studies consistent with iron deficiency  Check stool for occult blood  1 unit PRBC  May consider IV iron while inpatient  Outpatient GI follow-up

## 2020-02-26 NOTE — ASSESSMENT & PLAN NOTE
Likely due to underlying CHF-unknown type  Supplemental oxygen  20 mg IV Lasix  Consult Cardiology

## 2020-02-26 NOTE — HPI
"84-year-old gentleman with past medical history of hypertension, and IDDM, DVT status post IVC filter on Xarelto, anal cancer in remission presented with chief complaint of dyspnea.  Upon further asking patient mentioned that he was in his usual state of health until couple days ago when he started experiencing dyspnea on exertion, feeling very fatigued and low energy.  Last night he went to bed being very fatigued and woke up in the middle of the night with severe shortness of breath and could not sleep and finally decided to come to ED.  He describes his chest pain as midsternal discomfort more pronounced upon activity.  Otherwise he denies any fever, chills, headache, dizziness, palpitations, nausea, vomiting, bladder or bowel symptoms.  He suffers from chronic low back pain and has outpatient pain clinic follow-up.  Patient tells me that in June 2019 he was told that he has congestive heart failure and had "lot of fluid around his lungs!"    In ED, patient was hypoxic with oxygen saturation in 80s while sitting and his blood pressure was very elevated.  CTA chest ruled out pulmonary embolism.     Patient denies smoking, heavy alcohol consumption or any recreational drug use     "

## 2020-02-26 NOTE — SUBJECTIVE & OBJECTIVE
Past Medical History:   Diagnosis Date    Allergy     Anticoagulant long-term use     Arthritis     Cancer     Cataract     CHF (congestive heart failure)     Clotting disorder     Diabetes mellitus, type 2     Encounter for blood transfusion     Hypertension     Lumbar radiculitis 12/10/2018    Ulcer        Past Surgical History:   Procedure Laterality Date    CAUDAL EPIDURAL STEROID INJECTION N/A 12/10/2018    Procedure: Injection-steroid-epidural-caudal;  Surgeon: Joe Matos MD;  Location: [facility] OR;  Service: Pain Management;  Laterality: N/A;  caudal CIARA     COLON SURGERY      EYE SURGERY      FRACTURE SURGERY      HERNIA REPAIR      INJECTION OF ANESTHETIC AGENT AROUND LATERAL BRANCH NERVES OF SACROILIAC JOINT Bilateral 1/17/2019    Procedure: BLOCK, NERVE, SACROILIAC JOINT, LATERAL BRANCH;  Surgeon: Joe Matos MD;  Location: [facility] OR;  Service: Pain Management;  Laterality: Bilateral;  bilateral SI joint injection    JOINT REPLACEMENT      RADIOFREQUENCY ABLATION Left 3/1/2019    Procedure: Radiofrequency Ablation;  Surgeon: Joe Matos MD;  Location: [facility] OR;  Service: Pain Management;  Laterality: Left;  L5, S1, S2    SPINE SURGERY      TONSILLECTOMY      VASECTOMY         Review of patient's allergies indicates:   Allergen Reactions    Cymbalta [duloxetine]      Passes out    Nsaids (non-steroidal anti-inflammatory drug)      Other reaction(s): gi distress  Previous ulcer       No current facility-administered medications on file prior to encounter.      Current Outpatient Medications on File Prior to Encounter   Medication Sig    glimepiride (AMARYL) 2 MG tablet Take 2 mg by mouth once daily.     JANUVIA 100 mg Tab Take 100 mg by mouth once daily.     lisinopril (PRINIVIL,ZESTRIL) 2.5 MG tablet Take 1 tablet (2.5 mg total) by mouth once daily.    pantoprazole (PROTONIX) 40 MG tablet TAKE 1 TABLET DAILY (Patient taking differently: Take 40 mg by mouth once daily. )    vit A/C/E  ac/ZnOx/cupric oxide (EYE VITAMIN AND MINERALS ORAL) Take 1 capsule by mouth once daily.     XARELTO 20 mg Tab TAKE 1 TABLET DAILY (Patient taking differently: Take 20 mg by mouth every evening. )    [DISCONTINUED] FREESTYLE LITE STRIPS Strp     [DISCONTINUED] multivitamin capsule Take 1 capsule by mouth once daily.    [DISCONTINUED] SANTYL ointment      Family History     None        Tobacco Use    Smoking status: Former Smoker    Smokeless tobacco: Former User   Substance and Sexual Activity    Alcohol use: Not Currently     Alcohol/week: 4.0 standard drinks     Types: 4 Cans of beer per week    Drug use: No    Sexual activity: Never     Review of Systems   Constitutional: Positive for fatigue. Negative for activity change and appetite change.   HENT: Negative for congestion and sore throat.    Eyes: Negative for discharge and visual disturbance.   Respiratory: Positive for shortness of breath. Negative for cough and chest tightness.    Cardiovascular: Positive for chest pain. Negative for leg swelling.   Gastrointestinal: Negative for abdominal pain and nausea.   Genitourinary: Negative for dysuria and hematuria.   Musculoskeletal: Positive for back pain. Negative for myalgias.   Skin: Negative for pallor and rash.   Neurological: Negative for dizziness and weakness.   Psychiatric/Behavioral: Negative for behavioral problems. The patient is not nervous/anxious.    All other systems reviewed and are negative.    Objective:     Vital Signs (Most Recent):  Temp: 98.5 °F (36.9 °C) (02/26/20 1519)  Pulse: 85 (02/26/20 1519)  Resp: 19 (02/26/20 1519)  BP: (!) 182/78 (02/26/20 1519)  SpO2: (!) 93 % (02/26/20 1519) Vital Signs (24h Range):  Temp:  [98.3 °F (36.8 °C)-99.1 °F (37.3 °C)] 98.5 °F (36.9 °C)  Pulse:  [] 85  Resp:  [18-24] 19  SpO2:  [87 %-100 %] 93 %  BP: (156-202)/(72-87) 182/78     Weight: 107.6 kg (237 lb 3.4 oz)  Body mass index is 29.65 kg/m².    Physical Exam   Constitutional: He is  oriented to person, place, and time. He appears well-developed and well-nourished.   Very pleasant elderly gentleman in no acute distress except severe fatigue and dyspnea   HENT:   Head: Atraumatic.   Mouth/Throat: Oropharynx is clear and moist.   Eyes: Pupils are equal, round, and reactive to light. EOM are normal.   Neck: Normal range of motion. Neck supple. No JVD present.   Cardiovascular: Normal rate, regular rhythm and normal heart sounds. Exam reveals no gallop.   No murmur heard.  Pulmonary/Chest: Effort normal and breath sounds normal. He has no wheezes.   On nasal cannula, mild tachypnea, mild crackles in bilateral bases   Abdominal: Soft. Bowel sounds are normal. He exhibits no distension. There is no rebound and no guarding.   Musculoskeletal: Normal range of motion. He exhibits no edema.   Lymphadenopathy:     He has no cervical adenopathy.   Neurological: He is alert and oriented to person, place, and time. No cranial nerve deficit.   Skin: Skin is warm and dry. Capillary refill takes less than 2 seconds. No rash noted.   Psychiatric: His behavior is normal.   Nursing note and vitals reviewed.        CRANIAL NERVES     CN III, IV, VI   Pupils are equal, round, and reactive to light.  Extraocular motions are normal.        Significant Labs: All pertinent labs within the past 24 hours have been reviewed.    Significant Imaging: I have reviewed all pertinent imaging results/findings within the past 24 hours.

## 2020-02-26 NOTE — ED NOTES
Pt to ER c/o SOB with chest tightness. 86% sat on room air, placed on 2L/nc which increased sats to high 90's.  Alert and oriented with no signs of distress noted.

## 2020-02-26 NOTE — ED PROVIDER NOTES
Encounter Date: 2/26/2020       History     Chief Complaint   Patient presents with    Chest Pain     Patient presents complaining of chest tightness and shortness of breath that became worse last night.  Patient has never had anything quite like that before.  EMS noted patient to be in mild distress on arrival requiring O2 therapy.  Patient has a history of clotting disorder.  Patient has no known heart problems.  Patient denies any GI bleed or black stool.  At the worst symptoms are moderate.  He has not had this before.  He denies any fever.  He has had chills. Upon EMS arrival they gave nitroglycerin which relieved patient's chest tightness        Review of patient's allergies indicates:   Allergen Reactions    Cymbalta [duloxetine]     Nsaids (non-steroidal anti-inflammatory drug)      Other reaction(s): gi distress  Previous ulcer     Past Medical History:   Diagnosis Date    Allergy     Arthritis     Cancer     Cataract     Clotting disorder     Diabetes mellitus, type 2     Hypertension     Lumbar radiculitis 12/10/2018    Ulcer      Past Surgical History:   Procedure Laterality Date    CAUDAL EPIDURAL STEROID INJECTION N/A 12/10/2018    Procedure: Injection-steroid-epidural-caudal;  Surgeon: Joe Matos MD;  Location: [facility] OR;  Service: Pain Management;  Laterality: N/A;  caudal CIARA     COLON SURGERY      EYE SURGERY      FRACTURE SURGERY      HERNIA REPAIR      INJECTION OF ANESTHETIC AGENT AROUND LATERAL BRANCH NERVES OF SACROILIAC JOINT Bilateral 1/17/2019    Procedure: BLOCK, NERVE, SACROILIAC JOINT, LATERAL BRANCH;  Surgeon: Joe Matos MD;  Location: [facility] OR;  Service: Pain Management;  Laterality: Bilateral;  bilateral SI joint injection    JOINT REPLACEMENT      RADIOFREQUENCY ABLATION Left 3/1/2019    Procedure: Radiofrequency Ablation;  Surgeon: Joe Matos MD;  Location: [facility] OR;  Service: Pain Management;  Laterality: Left;  L5, S1, S2    SPINE SURGERY      TONSILLECTOMY       VASECTOMY       No family history on file.  Social History     Tobacco Use    Smoking status: Former Smoker    Smokeless tobacco: Former User   Substance Use Topics    Alcohol use: Yes     Alcohol/week: 4.0 standard drinks     Types: 4 Cans of beer per week    Drug use: No     Review of Systems   Respiratory: Positive for shortness of breath.    Cardiovascular: Positive for chest pain.   All other systems reviewed and are negative.      Physical Exam     Initial Vitals   BP Pulse Resp Temp SpO2   02/26/20 0834 02/26/20 0834 02/26/20 0837 02/26/20 0837 02/26/20 0834   (!) 177/72 100 (!) 24 98.5 °F (36.9 °C) (!) 94 %      MAP       --                Physical Exam    Nursing note and vitals reviewed.  Constitutional: He appears well-developed and well-nourished. He is not diaphoretic. No distress.   HENT:   Head: Normocephalic and atraumatic.   Mouth/Throat: Oropharynx is clear and moist.   Eyes: EOM are normal. Pupils are equal, round, and reactive to light.   Neck: Normal range of motion. Neck supple.   Cardiovascular: Normal rate, regular rhythm, normal heart sounds and intact distal pulses.   Pulmonary/Chest: Breath sounds normal. No respiratory distress.   Abdominal: Soft. Bowel sounds are normal.   Central scar   Musculoskeletal: Normal range of motion.   Neurological: He is alert and oriented to person, place, and time. He has normal strength.   Skin: Skin is warm and dry.   Psychiatric: He has a normal mood and affect. His behavior is normal. Judgment and thought content normal.         ED Course   Procedures  Labs Reviewed   CBC W/ AUTO DIFFERENTIAL - Abnormal; Notable for the following components:       Result Value    RBC 3.28 (*)     Hemoglobin 8.4 (*)     Hematocrit 28.1 (*)     Mean Corpuscular Hemoglobin 25.6 (*)     Mean Corpuscular Hemoglobin Conc 29.9 (*)     RDW 17.2 (*)     Immature Granulocytes 0.9 (*)     Gran # (ANC) 10.1 (*)     Immature Grans (Abs) 0.11 (*)     Lymph # 0.4 (*)     Mono  # 1.4 (*)     Gran% 83.1 (*)     Lymph% 3.4 (*)     All other components within normal limits   COMPREHENSIVE METABOLIC PANEL - Abnormal; Notable for the following components:    Glucose 227 (*)     Calcium 8.5 (*)     All other components within normal limits   B-TYPE NATRIURETIC PEPTIDE - Abnormal; Notable for the following components:     (*)     All other components within normal limits   TROPONIN I   PROTIME-INR   OCCULT BLOOD X 1, STOOL   TYPE & SCREEN   PREPARE RBC SOFT     EKG Readings: (Independently Interpreted)   EKG with right bundle branch block     ECG Results          EKG 12-lead (In process)  Result time 02/26/20 08:47:24    In process by Interface, Lab In [facility] (02/26/20 08:47:24)                 Narrative:    Test Reason : R07.9,    Vent. Rate : 095 BPM     Atrial Rate : 095 BPM     P-R Int : 164 ms          QRS Dur : 128 ms      QT Int : 388 ms       P-R-T Axes : 077 -23 002 degrees     QTc Int : 487 ms    Sinus rhythm with Premature atrial complexes  Right bundle branch block  Abnormal ECG  No previous ECGs available    Referred By: [email or web address]   SELF           Confirmed By:                             Imaging Results          CTA Chest Non-Coronary (PE Study) (Final result)  Result time 02/26/20 11:17:40    Final result by Madi Hewitt MD (02/26/20 11:17:40)                 Impression:      1. No evidence of pulmonary embolism to the segmental arterial level. If there is continued clinical concern, consider further evaluation with lower extremity doppler.    2.  Trace bilateral pleural effusions and adjacent atelectasis.    3.  Mediastinal and hilar lymphadenopathy, possibly reactive/inflammatory in nature, consider interval follow-up past lymphoproliferative disorder/malignancy is not excluded.    4.  Mild cardiomegaly with scattered coronary arterial calcifications (three-vessel disease) and atheromatous calcifications at the aortic arch with no aneurysm identified.    5.   Relative diffuse low-attenuation liver consistent with steatosis.      Electronically signed by: Madi Hewitt MD  Date:    02/26/2020  Time:    11:17             Narrative:      CMS MANDATED QUALITY DATA - CT RADIATION - 436    All CT scans at this facility utilize dose modulation, iterative reconstruction, and/or weight based dosing when appropriate to reduce radiation dose to as low as reasonably achievable.    CLINICAL HISTORY:  ([id number])83 y/o  (1935) M    Chest pain, acute, PE suspected, intermed prob, negative D-dimer;    TECHNIQUE:  (A#[id number], exam time 2/26/2020 11:06)    CTA CHEST NON CORONARY [id number]    Axial CT examination of the chest with attention to the pulmonary arteries was performed using contiguous axial images from the diaphragms to the lung apices following the intravenous administration of non-ionic contrast material. Images were reviewed using lung, mediastinal, and bone windows. The study was performed with thin sections with subsequent 3-D reformats/MIP/reconstructions in multiple planes.    COMPARISON:  Radiograph from 12/26/2012    FINDINGS:  CTA PE evaluation: This is a moderate quality study for the evaluation of pulmonary embolism  secondary to a combination of contrast bolus timing and motion artifact. The pulmonary arteries are normal in appearance without pulmonary emboli noted up to the segmental level, noting the limitations of CT technique for identifying small or isolated subsegmental emboli.    CT Chest:    Visualized neck: Within normal limits.    Lungs: Trace bilateral pleural effusions and adjacent atelectasis.  Mild bilateral dependent ground-glass attenuation opacity favored to represent atelectasis and/or scarring.    Airway: The trachea and central bronchial tree appear normal.    Pleura: There is no pneumothorax.    Cardiovascular: The heart is mildly enlarged in size.  There are scattered atheromatous calcifications at the aortic arch.  The pulmonary artery  is enlarged, a nonspecific finding which may represent pulmonary vascular congestion/trace edema or pulmonary arterial hypertension.    Mediastinum: Mediastinal lymphadenopathy is seen with index nodes as outlined below:    -12 x 16 mm right paratracheal/paraesophageal node (image 61 series 4)    -14 x 25 mm right paratracheal node (image 119)    -11 x 14 mm AP window node (image 117)    -19 x 32 mm subcarinal tadeo conglomerate (image 154)    -14 x 23 mm right hilar node (image 156)    -12 x 16 mm left hilar node (image 159)    Soft tissues: normal    Musculoskeletal: There are moderate degenerative changes of the thoracic spine.  There are no suspicious osseous lesions.  There is diffusely decreased osseous mineralization (suggesting osteoporosis/osteopenia).    Esophagus: normal    Upper Abdomen: Relative diffuse low-attenuation liver consistent with steatosis.                               X-Ray Chest AP Portable (Final result)  Result time 02/26/20 09:14:18    Final result by Jesus Ford IV, MD (02/26/20 09:14:18)                 Impression:      Mild bilateral basilar pulmonary opacities which may reflect mild volume loss.  Radiographic follow-up is recommended.    Mild central pulmonary vascular prominence.    Arteriosclerosis.      Electronically signed by: Jesus Ford IV., MD  Date:    02/26/2020  Time:    09:14             Narrative:    EXAMINATION:  XR CHEST AP PORTABLE    CLINICAL HISTORY:  Chest Tightness;    COMPARISON:  01/13/2016, 02/03/2015.    FINDINGS:  The cardiac silhouette is within normal limits in size.  There is mild central pulmonary vascular prominence.  There is arteriosclerotic calcification within the aortic arch.    There are minor bilateral basilar pulmonary opacities which may reflect mild volume loss.  There are otherwise no confluent infiltrates.  No significant effusion is observed.  Monitoring electrodes overlie the chest.                                 Medical Decision  Making:   ED Management:  Patient presenting with shortness of breath and chest discomfort that was relieved with nitroglycerin.  For arrival to the ER patient no longer in distress but did have some hypoxia with sats at 87%.  Workup reveals anemia of uncertain etiology.  Patient not having any GI bleed.  Rectal exam reveals brown stool.  This could be contributing to his shortness of breath.  Patient also found to have coronary artery calcifications on CT scan and with pain relief with nitroglycerin there is certainly concern for coronary artery disease.  Patient required admission to the hospital for further heart evaluation and blood transfusion.                                 Clinical Impression:       ICD-10-CM ICD-9-CM   1. Hypoxia R09.02 799.02   2. Chest pain R07.9 786.50             ED Disposition Condition    Admit                           Dave Medina MD  02/26/20 1157

## 2020-02-26 NOTE — ASSESSMENT & PLAN NOTE
Accelerated hypertension, multiple systolic blood pressure readings above 180  20 mg IV Lasix  P.r.n. hydralazine and labetalol  Will up titrate antihypertensives once echo results are back  Monitor

## 2020-02-26 NOTE — H&P
"[facility]  History & Physical    DOS: 02/26/2020    Patient Name: Ric Guzmán  MRN: [id number]  Admission Date: 2/26/2020  Attending Physician: Jules Owens MD   Primary Care Provider: Dave Enriquez MD         Patient information was obtained from patient, spouse/SO and ER records.     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  Upon further asking patient mentioned that he was in his usual state of health until couple days ago when he started experiencing dyspnea on exertion, feeling very fatigued and low energy.  Last night he went to bed being very fatigued and woke up in the middle of the night with severe shortness of breath and could not sleep and finally decided to come to ED.  He describes his chest pain as midsternal discomfort more pronounced upon activity.  Otherwise he denies any fever, chills, headache, dizziness, palpitations, nausea, vomiting, bladder or bowel symptoms.  He suffers from chronic low back pain and has outpatient pain clinic follow-up.  Patient tells me that in June 2019 he was told that he has congestive heart failure and had "lot of fluid around his lungs!"    In ED, patient was hypoxic with oxygen saturation in 80s while sitting and his blood pressure was very elevated.  CTA chest ruled out pulmonary embolism.     Patient denies smoking, heavy alcohol consumption or any recreational drug use       Past Medical History:   Diagnosis Date    Allergy     Anticoagulant long-term use     Arthritis     Cancer     Cataract     CHF (congestive heart failure)     Clotting disorder     Diabetes mellitus, type 2     Encounter for blood transfusion     Hypertension  "    Lumbar radiculitis 12/10/2018    Ulcer        Past Surgical History:   Procedure Laterality Date    CAUDAL EPIDURAL STEROID INJECTION N/A 12/10/2018    Procedure: Injection-steroid-epidural-caudal;  Surgeon: Joe Matos MD;  Location: [facility] OR;  Service: Pain Management;  Laterality: N/A;  caudal CIARA     COLON SURGERY      EYE SURGERY      FRACTURE SURGERY      HERNIA REPAIR      INJECTION OF ANESTHETIC AGENT AROUND LATERAL BRANCH NERVES OF SACROILIAC JOINT Bilateral 1/17/2019    Procedure: BLOCK, NERVE, SACROILIAC JOINT, LATERAL BRANCH;  Surgeon: Joe Matos MD;  Location: [facility] OR;  Service: Pain Management;  Laterality: Bilateral;  bilateral SI joint injection    JOINT REPLACEMENT      RADIOFREQUENCY ABLATION Left 3/1/2019    Procedure: Radiofrequency Ablation;  Surgeon: Joe Matos MD;  Location: [facility] OR;  Service: Pain Management;  Laterality: Left;  L5, S1, S2    SPINE SURGERY      TONSILLECTOMY      VASECTOMY         Review of patient's allergies indicates:   Allergen Reactions    Cymbalta [duloxetine]      Passes out    Nsaids (non-steroidal anti-inflammatory drug)      Other reaction(s): gi distress  Previous ulcer       No current facility-administered medications on file prior to encounter.      Current Outpatient Medications on File Prior to Encounter   Medication Sig    glimepiride (AMARYL) 2 MG tablet Take 2 mg by mouth once daily.     JANUVIA 100 mg Tab Take 100 mg by mouth once daily.     lisinopril (PRINIVIL,ZESTRIL) 2.5 MG tablet Take 1 tablet (2.5 mg total) by mouth once daily.    pantoprazole (PROTONIX) 40 MG tablet TAKE 1 TABLET DAILY (Patient taking differently: Take 40 mg by mouth once daily. )    vit A/C/E ac/ZnOx/cupric oxide (EYE VITAMIN AND MINERALS ORAL) Take 1 capsule by mouth once daily.     XARELTO 20 mg Tab TAKE 1 TABLET DAILY (Patient taking differently: Take 20 mg by mouth every evening. )    [DISCONTINUED] FREESTYLE LITE STRIPS Strp     [DISCONTINUED]  multivitamin capsule Take 1 capsule by mouth once daily.    [DISCONTINUED] SANTYL ointment      Family History     None        Tobacco Use    Smoking status: Former Smoker    Smokeless tobacco: Former User   Substance and Sexual Activity    Alcohol use: Not Currently     Alcohol/week: 4.0 standard drinks     Types: 4 Cans of beer per week    Drug use: No    Sexual activity: Never     Review of Systems   Constitutional: Positive for fatigue. Negative for activity change and appetite change.   HENT: Negative for congestion and sore throat.    Eyes: Negative for discharge and visual disturbance.   Respiratory: Positive for shortness of breath. Negative for cough and chest tightness.    Cardiovascular: Positive for chest pain. Negative for leg swelling.   Gastrointestinal: Negative for abdominal pain and nausea.   Genitourinary: Negative for dysuria and hematuria.   Musculoskeletal: Positive for back pain. Negative for myalgias.   Skin: Negative for pallor and rash.   Neurological: Negative for dizziness and weakness.   Psychiatric/Behavioral: Negative for behavioral problems. The patient is not nervous/anxious.    All other systems reviewed and are negative.    Objective:     Vital Signs (Most Recent):  Temp: 98.5 °F (36.9 °C) (02/26/20 1519)  Pulse: 85 (02/26/20 1519)  Resp: 19 (02/26/20 1519)  BP: (!) 182/78 (02/26/20 1519)  SpO2: (!) 93 % (02/26/20 1519) Vital Signs (24h Range):  Temp:  [98.3 °F (36.8 °C)-99.1 °F (37.3 °C)] 98.5 °F (36.9 °C)  Pulse:  [] 85  Resp:  [18-24] 19  SpO2:  [87 %-100 %] 93 %  BP: (156-202)/(72-87) 182/78     Weight: 107.6 kg (237 lb 3.4 oz)  Body mass index is 29.65 kg/m².    Physical Exam   Constitutional: He is oriented to person, place, and time. He appears well-developed and well-nourished.   Very pleasant elderly gentleman in no acute distress except severe fatigue and dyspnea   HENT:   Head: Atraumatic.   Mouth/Throat: Oropharynx is clear and moist.   Eyes: Pupils are  equal, round, and reactive to light. EOM are normal.   Neck: Normal range of motion. Neck supple. No JVD present.   Cardiovascular: Normal rate, regular rhythm and normal heart sounds. Exam reveals no gallop.   No murmur heard.  Pulmonary/Chest: Effort normal and breath sounds normal. He has no wheezes.   On nasal cannula, mild tachypnea, mild crackles in bilateral bases   Abdominal: Soft. Bowel sounds are normal. He exhibits no distension. There is no rebound and no guarding.   Musculoskeletal: Normal range of motion. He exhibits no edema.   Lymphadenopathy:     He has no cervical adenopathy.   Neurological: He is alert and oriented to person, place, and time. No cranial nerve deficit.   Skin: Skin is warm and dry. Capillary refill takes less than 2 seconds. No rash noted.   Psychiatric: His behavior is normal.   Nursing note and vitals reviewed.        CRANIAL NERVES     CN III, IV, VI   Pupils are equal, round, and reactive to light.  Extraocular motions are normal.        Significant Labs: All pertinent labs within the past 24 hours have been reviewed.    Significant Imaging: I have reviewed all pertinent imaging results/findings within the past 24 hours.    Assessment/Plan:     * Dyspnea  Likely due to symptomatic anemia however CTA chest showed cardiomegaly, coronary calcifications  Undiagnosed CHF-unknown type  CT chest ruled out any pulmonary embolism  Point of care EKG and cardiac enzymes are not impressive for any ischemic event  Transfuse 1 unit PRBC  Blood pressure control  20 mg IV Lasix  2D echo  Serial EKGs, cardiac enzymes, telemetry monitoring  Check for occult blood  Consult Cardiology    Hypoxia  Likely due to underlying CHF-unknown type  Supplemental oxygen  20 mg IV Lasix  Consult Cardiology      Chest pain  CTA chest showed multivessel coronary calcification  Likely precipitated by symptomatic anemia  Serial EKGs, cardiac enzymes, telemetry monitoring, 2D echo  Lipid profile in  morning  Aspirin, statin  Consult Cardiology  P.r.n. nitroglycerin      Iron deficiency anemia  Pt denies hemetemesis/Melena however he has significant history of perforated GI ulcer requiring surgery.  Of note patient is on Xarelto  Iron studies consistent with iron deficiency  Check stool for occult blood  1 unit PRBC  May consider IV iron while inpatient  Outpatient GI follow-up      Essential hypertension  Accelerated hypertension, multiple systolic blood pressure readings above 180  20 mg IV Lasix  P.r.n. hydralazine and labetalol  Will up titrate antihypertensives once echo results are back  Monitor      Type 2 diabetes mellitus without complication, without long-term current use of insulin  Check A1c  Sliding scale insulin  Accuchecks      Lumbar radiculitis  Outpatient follow-up  P.r.n. Norco      VTE Risk Mitigation (From admission, onward)         Ordered     rivaroxaban tablet 20 mg  Nightly      02/26/20 1426     Reason for No Pharmacological VTE Prophylaxis  Once     Question:  Reasons:  Answer:  Already adequately anticoagulated on oral Anticoagulants    02/26/20 1426     IP VTE HIGH RISK PATIENT  Once      02/26/20 1426                   Jules Owens MD  Department of Hospital Medicine   [facility]

## 2020-02-27 PROBLEM — I50.33 ACUTE ON CHRONIC DIASTOLIC HEART FAILURE: Status: ACTIVE | Noted: 2020-02-27

## 2020-02-27 LAB
ALBUMIN SERPL BCP-MCNC: 3.2 G/DL (ref 3.5–5.2)
ALP SERPL-CCNC: 54 U/L (ref 55–135)
ALT SERPL W/O P-5'-P-CCNC: 18 U/L (ref 10–44)
ANION GAP SERPL CALC-SCNC: 9 MMOL/L (ref 8–16)
AST SERPL-CCNC: 18 U/L (ref 10–40)
BASOPHILS # BLD AUTO: 0.02 K/UL (ref 0–0.2)
BASOPHILS NFR BLD: 0.2 % (ref 0–1.9)
BILIRUB SERPL-MCNC: 1.3 MG/DL (ref 0.1–1)
BUN SERPL-MCNC: 25 MG/DL (ref 8–23)
CALCIUM SERPL-MCNC: 8.4 MG/DL (ref 8.7–10.5)
CHLORIDE SERPL-SCNC: 105 MMOL/L (ref 95–110)
CHOLEST SERPL-MCNC: 146 MG/DL (ref 120–199)
CHOLEST/HDLC SERPL: 3.5 {RATIO} (ref 2–5)
CO2 SERPL-SCNC: 23 MMOL/L (ref 23–29)
CREAT SERPL-MCNC: 1.1 MG/DL (ref 0.5–1.4)
DIFFERENTIAL METHOD: ABNORMAL
EOSINOPHIL # BLD AUTO: 0 K/UL (ref 0–0.5)
EOSINOPHIL NFR BLD: 0 % (ref 0–8)
ERYTHROCYTE [DISTWIDTH] IN BLOOD BY AUTOMATED COUNT: 16.8 % (ref 11.5–14.5)
EST. GFR  (AFRICAN AMERICAN): >60 ML/MIN/1.73 M^2
EST. GFR  (NON AFRICAN AMERICAN): >60 ML/MIN/1.73 M^2
ESTIMATED AVG GLUCOSE: 160 MG/DL (ref 68–131)
GLUCOSE SERPL-MCNC: 188 MG/DL (ref 70–110)
GLUCOSE SERPL-MCNC: 198 MG/DL (ref 70–110)
GLUCOSE SERPL-MCNC: 236 MG/DL (ref 70–110)
GLUCOSE SERPL-MCNC: 240 MG/DL (ref 70–110)
HBA1C MFR BLD HPLC: 7.2 % (ref 4.5–6.2)
HCT VFR BLD AUTO: 28.4 % (ref 40–54)
HDLC SERPL-MCNC: 42 MG/DL (ref 40–75)
HDLC SERPL: 28.8 % (ref 20–50)
HGB BLD-MCNC: 8.9 G/DL (ref 14–18)
IMM GRANULOCYTES # BLD AUTO: 0.07 K/UL (ref 0–0.04)
IMM GRANULOCYTES NFR BLD AUTO: 0.8 % (ref 0–0.5)
INR PPP: 3.6
LDLC SERPL CALC-MCNC: 96.2 MG/DL (ref 63–159)
LYMPHOCYTES # BLD AUTO: 0.7 K/UL (ref 1–4.8)
LYMPHOCYTES NFR BLD: 7.1 % (ref 18–48)
MAGNESIUM SERPL-MCNC: 2 MG/DL (ref 1.6–2.6)
MCH RBC QN AUTO: 25.9 PG (ref 27–31)
MCHC RBC AUTO-ENTMCNC: 31.3 G/DL (ref 32–36)
MCV RBC AUTO: 83 FL (ref 82–98)
MONOCYTES # BLD AUTO: 1.1 K/UL (ref 0.3–1)
MONOCYTES NFR BLD: 11.7 % (ref 4–15)
NEUTROPHILS # BLD AUTO: 7.5 K/UL (ref 1.8–7.7)
NEUTROPHILS NFR BLD: 80.2 % (ref 38–73)
NONHDLC SERPL-MCNC: 104 MG/DL
NRBC BLD-RTO: 0 /100 WBC
PHOSPHATE SERPL-MCNC: 3.3 MG/DL (ref 2.7–4.5)
PLATELET # BLD AUTO: 222 K/UL (ref 150–350)
PMV BLD AUTO: 11.7 FL (ref 9.2–12.9)
POTASSIUM SERPL-SCNC: 3.9 MMOL/L (ref 3.5–5.1)
PROT SERPL-MCNC: 6.8 G/DL (ref 6–8.4)
PROTHROMBIN TIME: 34.6 SEC (ref 10.6–14.8)
RBC # BLD AUTO: 3.44 M/UL (ref 4.6–6.2)
SODIUM SERPL-SCNC: 137 MMOL/L (ref 136–145)
TRIGL SERPL-MCNC: 39 MG/DL (ref 30–150)
TROPONIN I SERPL DL<=0.01 NG/ML-MCNC: 0.03 NG/ML
WBC # BLD AUTO: 9.31 K/UL (ref 3.9–12.7)

## 2020-02-27 PROCEDURE — 99900035 HC TECH TIME PER 15 MIN (STAT)

## 2020-02-27 PROCEDURE — 63600175 PHARM REV CODE 636 W HCPCS: Performed by: HOSPITALIST

## 2020-02-27 PROCEDURE — 94761 N-INVAS EAR/PLS OXIMETRY MLT: CPT

## 2020-02-27 PROCEDURE — 25000242 PHARM REV CODE 250 ALT 637 W/ HCPCS: Performed by: HOSPITALIST

## 2020-02-27 PROCEDURE — 80053 COMPREHEN METABOLIC PANEL: CPT

## 2020-02-27 PROCEDURE — 25000003 PHARM REV CODE 250: Performed by: HOSPITALIST

## 2020-02-27 PROCEDURE — 80061 LIPID PANEL: CPT

## 2020-02-27 PROCEDURE — 94640 AIRWAY INHALATION TREATMENT: CPT

## 2020-02-27 PROCEDURE — 21400001 HC TELEMETRY ROOM

## 2020-02-27 PROCEDURE — 82962 GLUCOSE BLOOD TEST: CPT

## 2020-02-27 PROCEDURE — 85610 PROTHROMBIN TIME: CPT

## 2020-02-27 PROCEDURE — 85025 COMPLETE CBC W/AUTO DIFF WBC: CPT

## 2020-02-27 PROCEDURE — 84100 ASSAY OF PHOSPHORUS: CPT

## 2020-02-27 PROCEDURE — 83735 ASSAY OF MAGNESIUM: CPT

## 2020-02-27 PROCEDURE — 84484 ASSAY OF TROPONIN QUANT: CPT

## 2020-02-27 PROCEDURE — 36415 COLL VENOUS BLD VENIPUNCTURE: CPT

## 2020-02-27 RX ORDER — IPRATROPIUM BROMIDE AND ALBUTEROL SULFATE 2.5; .5 MG/3ML; MG/3ML
3 SOLUTION RESPIRATORY (INHALATION) EVERY 6 HOURS PRN
Status: DISCONTINUED | OUTPATIENT
Start: 2020-02-27 | End: 2020-02-28 | Stop reason: HOSPADM

## 2020-02-27 RX ORDER — FUROSEMIDE 10 MG/ML
20 INJECTION INTRAMUSCULAR; INTRAVENOUS ONCE
Status: COMPLETED | OUTPATIENT
Start: 2020-02-27 | End: 2020-02-27

## 2020-02-27 RX ADMIN — IPRATROPIUM BROMIDE AND ALBUTEROL SULFATE 3 ML: .5; 3 SOLUTION RESPIRATORY (INHALATION) at 02:02

## 2020-02-27 RX ADMIN — ASPIRIN 81 MG: 81 TABLET, DELAYED RELEASE ORAL at 08:02

## 2020-02-27 RX ADMIN — LISINOPRIL 5 MG: 5 TABLET ORAL at 08:02

## 2020-02-27 RX ADMIN — METOPROLOL TARTRATE 25 MG: 25 TABLET ORAL at 08:02

## 2020-02-27 RX ADMIN — ACETAMINOPHEN 650 MG: 325 TABLET ORAL at 11:02

## 2020-02-27 RX ADMIN — FUROSEMIDE 20 MG: 10 INJECTION, SOLUTION INTRAMUSCULAR; INTRAVENOUS at 04:02

## 2020-02-27 RX ADMIN — PANTOPRAZOLE SODIUM 40 MG: 40 TABLET, DELAYED RELEASE ORAL at 05:02

## 2020-02-27 NOTE — ASSESSMENT & PLAN NOTE
Pt denies hemetemesis/Melena however he has significant history of perforated GI ulcer requiring surgery.  Of note patient is on Xarelto  Iron studies consistent with iron deficiency  Transfused 1 unit PRBC yesterday  GI is consulted considering ongoing use of Xarelto-pending evaluation

## 2020-02-27 NOTE — ASSESSMENT & PLAN NOTE
Likely exacerbated by anemia  CT chest ruled out any pulmonary embolism  Transfused 1 unit PRBC yesterday  Blood pressure control  Repeat 20 mg IV Lasix  2D echo finding reviewed  Consult Cardiology-recs appreciated  GI consult as patient was very anemic on arrival and he is already on Xarelto-pending evaluation

## 2020-02-27 NOTE — SUBJECTIVE & OBJECTIVE
Interval History: Patient was seen and examined bedside.  He diuresed extremely well yesterday with 20 mg IV Lasix and blood pressure is better controlled.  His breathing comfortably on room air however still gets short winded upon minimal activity.  No acute events overnight as per nursing staff.  Denies any hematemesis or melena.  Today INR is 3.6.       Review of Systems   Constitutional: Positive for fatigue. Negative for activity change and appetite change.   HENT: Negative for congestion and sore throat.    Eyes: Negative for discharge and visual disturbance.   Respiratory: Positive for shortness of breath. Negative for cough and chest tightness.    Cardiovascular: Negative for leg swelling.   Gastrointestinal: Negative for abdominal pain and nausea.   Genitourinary: Negative for dysuria and hematuria.   Musculoskeletal: Positive for back pain. Negative for myalgias.   Skin: Negative for pallor and rash.   Neurological: Negative for dizziness and weakness.   Psychiatric/Behavioral: Negative for behavioral problems. The patient is not nervous/anxious.    All other systems reviewed and are negative.    Objective:     Vital Signs (Most Recent):  Temp: 98 °F (36.7 °C) (02/27/20 1128)  Pulse: 67 (02/27/20 1128)  Resp: 17 (02/27/20 1128)  BP: 137/63 (02/27/20 1128)  SpO2: (!) 94 % (02/27/20 1128) Vital Signs (24h Range):  Temp:  [97.8 °F (36.6 °C)-99 °F (37.2 °C)] 98 °F (36.7 °C)  Pulse:  [67-92] 67  Resp:  [16-21] 17  SpO2:  [93 %-96 %] 94 %  BP: (133-198)/(53-78) 137/63     Weight: 105.7 kg (232 lb 14.7 oz)  Body mass index is 29.11 kg/m².    Intake/Output Summary (Last 24 hours) at 2/27/2020 1517  Last data filed at 2/27/2020 1304  Gross per 24 hour   Intake 1133.33 ml   Output 1800 ml   Net -666.67 ml      Physical Exam   Constitutional: He is oriented to person, place, and time. He appears well-developed and well-nourished.   Very pleasant elderly gentleman in no acute distress except severe fatigue and dyspnea    HENT:   Head: Atraumatic.   Mouth/Throat: Oropharynx is clear and moist.   Eyes: Pupils are equal, round, and reactive to light. EOM are normal.   Neck: Normal range of motion. Neck supple. No JVD present.   Cardiovascular: Normal rate, regular rhythm and normal heart sounds. Exam reveals no gallop.   No murmur heard.  Pulmonary/Chest: Effort normal and breath sounds normal. He has no wheezes.   On nasal cannula, mild tachypnea, mild crackles in bilateral bases   Abdominal: Soft. Bowel sounds are normal. He exhibits no distension. There is no rebound and no guarding.   Musculoskeletal: Normal range of motion. He exhibits no edema.   Lymphadenopathy:     He has no cervical adenopathy.   Neurological: He is alert and oriented to person, place, and time. No cranial nerve deficit.   Skin: Skin is warm and dry. Capillary refill takes less than 2 seconds. No rash noted.   Psychiatric: His behavior is normal.   Nursing note and vitals reviewed.      Significant Labs: All pertinent labs within the past 24 hours have been reviewed.    Significant Imaging: I have reviewed all pertinent imaging results/findings within the past 24 hours.

## 2020-02-27 NOTE — HOSPITAL COURSE
02/27:  Patient was seen and examined bedside.  He diuresed extremely well yesterday with 20 mg IV Lasix and blood pressure is better controlled.  His breathing comfortably on room air however still gets short winded upon minimal activity.  No acute events overnight as per nursing staff.  Denies any hematemesis or melena.  Today INR is 3.6.     02/28:  Patient was seen and examined bedside after EGD.  Denies any new symptoms.  He reports significant improvement in his shortness of breath and weakness after getting blood and some Lasix.  He was cleared for discharge by Cardiology and GI.     In summary,  During his hospital stay patient was treated for symptomatic anemia due to iron deficiency.  Cardiology was consulted but overall his cardiac function came out okay and there was no need for any ischemic workup at this point.  Patient had significant GI history with perforated ulcer and questionable GI malignancy in the past.  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  GI was consulted who perform EGD today and patient was found to have multiple nonbleeding ulcers and polyps.  Today I discussed case with GI who recommended outpatient colonoscopy on March 2nd after holding anticoagulation for at least 72 hr.  Patient was discharged home with instructions to hold Xarelto until procedure and we provided script for bridging dose of Lovenox.  He was advised to not take Lovenox night prior to procedure.  He was also discharged on Protonix b.i.d..  Patient was advised to follow up with her PCP and regular cardiologist.     Review of systems:  Mild headache otherwise denies any new symptoms    Physical Exam   Constitutional: He is oriented to person, place, and time. He appears well-developed and well-nourished.   Very pleasant elderly gentleman in no acute distress except fatigue.   HENT:   Head: Atraumatic.   Mouth/Throat: Oropharynx is clear and moist.   Eyes: Pupils are equal,  round, and reactive to light. EOM are normal.   Neck: Normal range of motion. Neck supple. No JVD present.   Cardiovascular: Normal rate, regular rhythm and normal heart sounds. Exam reveals no gallop.   No murmur heard.  Pulmonary/Chest: Effort normal and breath sounds normal. He has no wheezes.   On room air, mild tachypnea, mild crackles in bilateral bases   Abdominal: Soft. Bowel sounds are normal. He exhibits no distension. There is no rebound and no guarding.   Musculoskeletal: Normal range of motion. He exhibits no edema.   Lymphadenopathy: He has no cervical adenopathy.   Neurological: He is alert and oriented to person, place, and time. No cranial nerve deficit.   Skin: Skin is warm and dry. Capillary refill takes less than 2 seconds. No rash noted.   Psychiatric: His behavior is normal.     Nursing note and vitals reviewed.

## 2020-02-27 NOTE — PROGRESS NOTES
[facility]  Cardiology  Progress Note    Patient Name: Ric Guzmán  MRN: [id number]  Admission Date: 2/26/2020  Hospital Length of Stay: 1 days  Code Status: Full Code   Attending Physician: Jules Owens MD   Primary Care Physician: Dave Enriquez MD  Expected Discharge Date:   Principal Problem:Dyspnea    Subjective:       Interval History:   Patient denies any chest pain. He is on room air and denies any shortness of breath. Remains anemic. GI consult pending.     ROS   Denies chest pains   Denies shortness of breath  Denies abdominal pain  Denies melena   Objective:     Vital Signs (Most Recent):  Temp: 97.8 °F (36.6 °C) (02/27/20 0722)  Pulse: 79 (02/27/20 0723)  Resp: 18 (02/27/20 0723)  BP: 139/63 (02/27/20 0722)  SpO2: 95 % (02/27/20 0722) Vital Signs (24h Range):  Temp:  [97.8 °F (36.6 °C)-99.1 °F (37.3 °C)] 97.8 °F (36.6 °C)  Pulse:  [76-94] 79  Resp:  [16-24] 18  SpO2:  [91 %-100 %] 95 %  BP: (133-202)/(53-87) 139/63     Weight: 105.7 kg (232 lb 14.7 oz)  Body mass index is 29.11 kg/m².    SpO2: 95 %  O2 Device (Oxygen Therapy): room air      Intake/Output Summary (Last 24 hours) at 2/27/2020 1031  Last data filed at 2/27/2020 0841  Gross per 24 hour   Intake 773.33 ml   Output 1800 ml   Net -1026.67 ml       Lines/Drains/Airways     Peripheral Intravenous Line                 Peripheral IV - Single Lumen 02/26/20 0825 18 G Left Forearm 1 day         Peripheral IV - Single Lumen 02/26/20 1437 20 G Right Antecubital less than 1 day                Scheduled Meds:   aspirin  81 mg Oral Daily    lisinopril  5 mg Oral Daily    metoprolol tartrate  25 mg Oral BID    pantoprazole  40 mg Oral Daily    rivaroxaban  20 mg Oral QHS     Continuous Infusions:  PRN Meds:.sodium chloride, acetaminophen, albuterol-ipratropium, dextrose 50%, dextrose 50%, glucagon (human recombinant), glucose, glucose, HYDROcodone-acetaminophen, insulin aspart U-100, magnesium oxide, magnesium oxide,  melatonin, ondansetron, potassium chloride 10%, potassium chloride 10%, potassium chloride 10%, potassium, sodium phosphates, potassium, sodium phosphates, potassium, sodium phosphates, sodium chloride 0.9%     Physical Exam  HEENT: Normocephalic, atraumatic, PERRL, Conjunctiva pink, no scleral icterus.   CVS: S1S2+, RRR, no murmurs, rubs or gallops, JVP: Normal.  LUNGS: Clear  ABDOMEN: Soft, NT, BS+  EXTREMITIES: No cyanosis, clubbing or edema  NEURO: AAO X 3.       Significant Labs:   ABG: No results for input(s): PH, PCO2, HCO3, POCSATURATED, BE in the last 48 hours., Blood Culture: No results for input(s): LABBLOO in the last 48 hours., BMP:   Recent Labs   Lab 02/26/20  0906 02/27/20  0334   * 240*    137   K 4.0 3.9    105   CO2 25 23   BUN 19 25*   CREATININE 1.0 1.1   CALCIUM 8.5* 8.4*   MG  --  2.0   , CMP   Recent Labs   Lab 02/26/20  0906 02/27/20  0334    137   K 4.0 3.9    105   CO2 25 23   * 240*   BUN 19 25*   CREATININE 1.0 1.1   CALCIUM 8.5* 8.4*   PROT 7.2 6.8   ALBUMIN 3.6 3.2*   BILITOT 0.8 1.3*   ALKPHOS 59 54*   AST 19 18   ALT 18 18   ANIONGAP 10 9   ESTGFRAFRICA >60.0 >60.0   EGFRNONAA >60.0 >60.0   , CBC   Recent Labs   Lab 02/26/20  0906 02/27/20  0334   WBC 12.16 9.31   HGB 8.4* 8.9*   HCT 28.1* 28.4*    222   , INR   Recent Labs   Lab 02/27/20  0334   INR 3.6   , Lipid Panel   Recent Labs   Lab 02/27/20  0334   CHOL 146   HDL 42   LDLCALC 96.2   TRIG 39   CHOLHDL 28.8   ,   Pathology Results  (Last 10 years)    None      , Troponin   Recent Labs   Lab 02/26/20  0906 02/27/20  0334   TROPONINI <0.030 0.032    and All pertinent lab results from the last 24 hours have been reviewed.    Significant Imaging: X-Ray: CXR: X-Ray Chest 1 View (CXR): No results found for this visit on 02/26/20.  Assessment and Plan:     IMPRESSION:    CHF. Preserved LVEF. Patient received Lasix and is diuresing well. Feels better.   Atypical chest pain. Patient  reportedly had a normal stress test in July 2019.   Acute on chronic anemia. S/p transfusion.   H/o Factor V Leiden deficieny.  H/o DVT/PE s/p IVC filter placement.   DM.     PLAN:  1. Continue present management. Pending GI eval.   2. Patient can follow up with his primary Cardiologist at discharge.   3. I will sign off. Please reconsult if needed.     Isha Hopper NP   Cardiology  [facility]    I have personally seen and examined the patient. I reviewed the notes, assessments, and/or procedures performed by Ms Isha Hopper, I concur with her documentation of Ric Guzmán.

## 2020-02-27 NOTE — ASSESSMENT & PLAN NOTE
Accelerated hypertension, multiple systolic blood pressure readings above 180  20 mg IV Lasix  P.r.n. hydralazine and labetalol  Responded well with Lasix, increased lisinopril dose, added metoprolol  Monitor

## 2020-02-27 NOTE — PLAN OF CARE
02/27/20 0723   Patient Assessment/Suction   Level of Consciousness (AVPU) alert   Respiratory Effort Normal;Unlabored   Expansion/Accessory Muscles/Retractions expansion symmetric;no retractions;no use of accessory muscles   All Lung Fields Breath Sounds clear;equal bilaterally;diminished   PRE-TX-O2   O2 Device (Oxygen Therapy) room air   Oxygen Concentration (%) 95   Pulse Oximetry Type Intermittent   $ Pulse Oximetry - Multiple Charge Pulse Oximetry - Multiple   Pulse 79   Resp 18   Aerosol Therapy   $ Aerosol Therapy Charges Refused

## 2020-02-27 NOTE — PLAN OF CARE
02/26/20 1830   Patient Assessment/Suction   Level of Consciousness (AVPU) alert   Respiratory Effort Normal;Unlabored   Expansion/Accessory Muscles/Retractions no use of accessory muscles;no retractions   All Lung Fields Breath Sounds clear   Rhythm/Pattern, Respiratory unlabored;pattern regular;depth regular   PRE-TX-O2   O2 Device (Oxygen Therapy) nasal cannula   $ Is the patient on Low Flow Oxygen? Yes   Flow (L/min) 2   SpO2 95 %   Pulse Oximetry Type Intermittent   $ Pulse Oximetry - Multiple Charge Pulse Oximetry - Multiple   Pulse 89   Resp 16   Aerosol Therapy   $ Aerosol Therapy Charges Aerosol Treatment   Daily Review of Necessity (SVN) completed   Respiratory Treatment Status (SVN) given   Treatment Route (SVN) mask;oxygen   Patient Position (SVN) HOB elevated   Post Treatment Assessment (SVN) breath sounds unchanged   Signs of Intolerance (SVN) none   Breath Sounds Post-Respiratory Treatment   Post-treatment Heart Rate (beats/min) 81   Post-treatment Resp Rate (breaths/min) 18

## 2020-02-27 NOTE — PROGRESS NOTES
"[facility] Medicine  Progress Note    DOS: 02/27/2020    Patient Name: Ric Guzmán  MRN: [id number]  Patient Class: IP- Inpatient   Admission Date: 2/26/2020  Length of Stay: 1 days  Attending Physician: Jules Owens MD  Primary Care Provider: Dave Enriquez MD        Subjective:     Principal Problem:Acute on chronic diastolic heart failure        HPI:  84-year-old gentleman with past medical history of hypertension, and IDDM, DVT status post IVC filter on Xarelto, anal cancer in remission presented with chief complaint of dyspnea.  Upon further asking patient mentioned that he was in his usual state of health until couple days ago when he started experiencing dyspnea on exertion, feeling very fatigued and low energy.  Last night he went to bed being very fatigued and woke up in the middle of the night with severe shortness of breath and could not sleep and finally decided to come to ED.  He describes his chest pain as midsternal discomfort more pronounced upon activity.  Otherwise he denies any fever, chills, headache, dizziness, palpitations, nausea, vomiting, bladder or bowel symptoms.  He suffers from chronic low back pain and has outpatient pain clinic follow-up.  Patient tells me that in June 2019 he was told that he has congestive heart failure and had "lot of fluid around his lungs!"    In ED, patient was hypoxic with oxygen saturation in 80s while sitting and his blood pressure was very elevated.  CTA chest ruled out pulmonary embolism.     Patient denies smoking, heavy alcohol consumption or any recreational drug use       Overview/Hospital Course:  02/27:  Patient was seen and examined bedside.  He diuresed extremely well yesterday with 20 mg IV Lasix and blood pressure is better controlled.  His breathing comfortably on room air however still gets short winded upon minimal activity.  No acute events overnight as per nursing staff.  Denies any hematemesis or melena.  " Today INR is 3.6.     Interval History: Patient was seen and examined bedside.  He diuresed extremely well yesterday with 20 mg IV Lasix and blood pressure is better controlled.  His breathing comfortably on room air however still gets short winded upon minimal activity.  No acute events overnight as per nursing staff.  Denies any hematemesis or melena.  Today INR is 3.6.       Review of Systems   Constitutional: Positive for fatigue. Negative for activity change and appetite change.   HENT: Negative for congestion and sore throat.    Eyes: Negative for discharge and visual disturbance.   Respiratory: Positive for shortness of breath. Negative for cough and chest tightness.    Cardiovascular: Negative for leg swelling.   Gastrointestinal: Negative for abdominal pain and nausea.   Genitourinary: Negative for dysuria and hematuria.   Musculoskeletal: Positive for back pain. Negative for myalgias.   Skin: Negative for pallor and rash.   Neurological: Negative for dizziness and weakness.   Psychiatric/Behavioral: Negative for behavioral problems. The patient is not nervous/anxious.    All other systems reviewed and are negative.    Objective:     Vital Signs (Most Recent):  Temp: 98 °F (36.7 °C) (02/27/20 1128)  Pulse: 67 (02/27/20 1128)  Resp: 17 (02/27/20 1128)  BP: 137/63 (02/27/20 1128)  SpO2: (!) 94 % (02/27/20 1128) Vital Signs (24h Range):  Temp:  [97.8 °F (36.6 °C)-99 °F (37.2 °C)] 98 °F (36.7 °C)  Pulse:  [67-92] 67  Resp:  [16-21] 17  SpO2:  [93 %-96 %] 94 %  BP: (133-198)/(53-78) 137/63     Weight: 105.7 kg (232 lb 14.7 oz)  Body mass index is 29.11 kg/m².    Intake/Output Summary (Last 24 hours) at 2/27/2020 1517  Last data filed at 2/27/2020 1304  Gross per 24 hour   Intake 1133.33 ml   Output 1800 ml   Net -666.67 ml      Physical Exam   Constitutional: He is oriented to person, place, and time. He appears well-developed and well-nourished.   Very pleasant elderly gentleman in no acute distress except  severe fatigue and dyspnea   HENT:   Head: Atraumatic.   Mouth/Throat: Oropharynx is clear and moist.   Eyes: Pupils are equal, round, and reactive to light. EOM are normal.   Neck: Normal range of motion. Neck supple. No JVD present.   Cardiovascular: Normal rate, regular rhythm and normal heart sounds. Exam reveals no gallop.   No murmur heard.  Pulmonary/Chest: Effort normal and breath sounds normal. He has no wheezes.   On nasal cannula, mild tachypnea, mild crackles in bilateral bases   Abdominal: Soft. Bowel sounds are normal. He exhibits no distension. There is no rebound and no guarding.   Musculoskeletal: Normal range of motion. He exhibits no edema.   Lymphadenopathy:     He has no cervical adenopathy.   Neurological: He is alert and oriented to person, place, and time. No cranial nerve deficit.   Skin: Skin is warm and dry. Capillary refill takes less than 2 seconds. No rash noted.   Psychiatric: His behavior is normal.   Nursing note and vitals reviewed.      Significant Labs: All pertinent labs within the past 24 hours have been reviewed.    Significant Imaging: I have reviewed all pertinent imaging results/findings within the past 24 hours.      Assessment/Plan:      * Acute on chronic diastolic heart failure  Likely exacerbated by anemia  CT chest ruled out any pulmonary embolism  Transfused 1 unit PRBC yesterday  Blood pressure control  Repeat 20 mg IV Lasix  2D echo finding reviewed  Consult Cardiology-recs appreciated  GI consult as patient was very anemic on arrival and he is already on Xarelto-pending evaluation    Hypoxia  Likely due to CHF exacerbation and underlying anemia  Better      Iron deficiency anemia  Pt denies hemetemesis/Melena however he has significant history of perforated GI ulcer requiring surgery.  Of note patient is on Xarelto  Iron studies consistent with iron deficiency  Transfused 1 unit PRBC yesterday  GI is consulted considering ongoing use of Xarelto-pending  evaluation    Essential hypertension  Accelerated hypertension, multiple systolic blood pressure readings above 180  20 mg IV Lasix  P.r.n. hydralazine and labetalol  Responded well with Lasix, increased lisinopril dose, added metoprolol  Monitor      Type 2 diabetes mellitus without complication, without long-term current use of insulin  Sliding scale insulin  Accuchecks      Lumbar radiculitis  Outpatient follow-up  P.r.n. Norco.      VTE Risk Mitigation (From admission, onward)         Ordered     Reason for No Pharmacological VTE Prophylaxis  Once     Question:  Reasons:  Answer:  Already adequately anticoagulated on oral Anticoagulants    02/26/20 1426     IP VTE HIGH RISK PATIENT  Once      02/26/20 1426                      Jules Owens MD  Department of Hospital Medicine   [facility]

## 2020-02-28 ENCOUNTER — ANESTHESIA (OUTPATIENT)
Dept: SURGERY | Facility: HOSPITAL | Age: 85
DRG: 811 | End: 2020-02-28
Payer: MEDICARE

## 2020-02-28 ENCOUNTER — ANESTHESIA EVENT (OUTPATIENT)
Dept: SURGERY | Facility: HOSPITAL | Age: 85
DRG: 811 | End: 2020-02-28
Payer: MEDICARE

## 2020-02-28 VITALS
RESPIRATION RATE: 18 BRPM | WEIGHT: 232.81 LBS | DIASTOLIC BLOOD PRESSURE: 66 MMHG | TEMPERATURE: 99 F | HEIGHT: 75 IN | BODY MASS INDEX: 28.95 KG/M2 | HEART RATE: 75 BPM | SYSTOLIC BLOOD PRESSURE: 147 MMHG | OXYGEN SATURATION: 95 %

## 2020-02-28 PROBLEM — R09.02 HYPOXIA: Status: RESOLVED | Noted: 2020-02-26 | Resolved: 2020-02-28

## 2020-02-28 PROBLEM — I50.33 ACUTE ON CHRONIC DIASTOLIC HEART FAILURE: Status: RESOLVED | Noted: 2020-02-27 | Resolved: 2020-02-28

## 2020-02-28 LAB
ALBUMIN SERPL BCP-MCNC: 3.6 G/DL (ref 3.5–5.2)
ALP SERPL-CCNC: 61 U/L (ref 55–135)
ALT SERPL W/O P-5'-P-CCNC: 19 U/L (ref 10–44)
ANION GAP SERPL CALC-SCNC: 9 MMOL/L (ref 8–16)
AST SERPL-CCNC: 18 U/L (ref 10–40)
BASOPHILS # BLD AUTO: 0.07 K/UL (ref 0–0.2)
BASOPHILS NFR BLD: 0.4 % (ref 0–1.9)
BILIRUB SERPL-MCNC: 1.2 MG/DL (ref 0.1–1)
BUN SERPL-MCNC: 33 MG/DL (ref 8–23)
CALCIUM SERPL-MCNC: 8.6 MG/DL (ref 8.7–10.5)
CHLORIDE SERPL-SCNC: 100 MMOL/L (ref 95–110)
CO2 SERPL-SCNC: 26 MMOL/L (ref 23–29)
CREAT SERPL-MCNC: 1.2 MG/DL (ref 0.5–1.4)
DIFFERENTIAL METHOD: ABNORMAL
EOSINOPHIL # BLD AUTO: 0.2 K/UL (ref 0–0.5)
EOSINOPHIL NFR BLD: 1.1 % (ref 0–8)
ERYTHROCYTE [DISTWIDTH] IN BLOOD BY AUTOMATED COUNT: 17 % (ref 11.5–14.5)
EST. GFR  (AFRICAN AMERICAN): >60 ML/MIN/1.73 M^2
EST. GFR  (NON AFRICAN AMERICAN): 55.6 ML/MIN/1.73 M^2
GLUCOSE SERPL-MCNC: 156 MG/DL (ref 70–110)
GLUCOSE SERPL-MCNC: 178 MG/DL (ref 70–110)
GLUCOSE SERPL-MCNC: 188 MG/DL (ref 70–110)
HCT VFR BLD AUTO: 29.8 % (ref 40–54)
HGB BLD-MCNC: 9.5 G/DL (ref 14–18)
IMM GRANULOCYTES # BLD AUTO: 0.14 K/UL (ref 0–0.04)
IMM GRANULOCYTES NFR BLD AUTO: 0.9 % (ref 0–0.5)
LYMPHOCYTES # BLD AUTO: 1 K/UL (ref 1–4.8)
LYMPHOCYTES NFR BLD: 6.5 % (ref 18–48)
MAGNESIUM SERPL-MCNC: 1.9 MG/DL (ref 1.6–2.6)
MCH RBC QN AUTO: 26.2 PG (ref 27–31)
MCHC RBC AUTO-ENTMCNC: 31.9 G/DL (ref 32–36)
MCV RBC AUTO: 82 FL (ref 82–98)
MONOCYTES # BLD AUTO: 1.8 K/UL (ref 0.3–1)
MONOCYTES NFR BLD: 11.3 % (ref 4–15)
NEUTROPHILS # BLD AUTO: 12.9 K/UL (ref 1.8–7.7)
NEUTROPHILS NFR BLD: 79.8 % (ref 38–73)
NRBC BLD-RTO: 0 /100 WBC
PHOSPHATE SERPL-MCNC: 3.3 MG/DL (ref 2.7–4.5)
PLATELET # BLD AUTO: 255 K/UL (ref 150–350)
PMV BLD AUTO: 12.4 FL (ref 9.2–12.9)
POTASSIUM SERPL-SCNC: 4.2 MMOL/L (ref 3.5–5.1)
PROT SERPL-MCNC: 7.4 G/DL (ref 6–8.4)
RBC # BLD AUTO: 3.62 M/UL (ref 4.6–6.2)
SODIUM SERPL-SCNC: 135 MMOL/L (ref 136–145)
WBC # BLD AUTO: 16.08 K/UL (ref 3.9–12.7)

## 2020-02-28 PROCEDURE — 43239 EGD BIOPSY SINGLE/MULTIPLE: CPT | Performed by: INTERNAL MEDICINE

## 2020-02-28 PROCEDURE — 84100 ASSAY OF PHOSPHORUS: CPT

## 2020-02-28 PROCEDURE — 27200043 HC FORCEPS, BIOPSY: Performed by: INTERNAL MEDICINE

## 2020-02-28 PROCEDURE — 63600175 PHARM REV CODE 636 W HCPCS: Performed by: NURSE ANESTHETIST, CERTIFIED REGISTERED

## 2020-02-28 PROCEDURE — 27000671 HC TUBING MICROBORE EXT: Performed by: STUDENT IN AN ORGANIZED HEALTH CARE EDUCATION/TRAINING PROGRAM

## 2020-02-28 PROCEDURE — 82962 GLUCOSE BLOOD TEST: CPT

## 2020-02-28 PROCEDURE — 37000009 HC ANESTHESIA EA ADD 15 MINS: Performed by: INTERNAL MEDICINE

## 2020-02-28 PROCEDURE — 37000008 HC ANESTHESIA 1ST 15 MINUTES: Performed by: INTERNAL MEDICINE

## 2020-02-28 PROCEDURE — 36415 COLL VENOUS BLD VENIPUNCTURE: CPT

## 2020-02-28 PROCEDURE — 85025 COMPLETE CBC W/AUTO DIFF WBC: CPT

## 2020-02-28 PROCEDURE — 88305 TISSUE EXAM BY PATHOLOGIST: CPT | Mod: TC

## 2020-02-28 PROCEDURE — 80053 COMPREHEN METABOLIC PANEL: CPT

## 2020-02-28 PROCEDURE — 63600175 PHARM REV CODE 636 W HCPCS: Performed by: INTERNAL MEDICINE

## 2020-02-28 PROCEDURE — 83735 ASSAY OF MAGNESIUM: CPT

## 2020-02-28 PROCEDURE — 94761 N-INVAS EAR/PLS OXIMETRY MLT: CPT

## 2020-02-28 PROCEDURE — 25000003 PHARM REV CODE 250: Performed by: HOSPITALIST

## 2020-02-28 RX ORDER — PANTOPRAZOLE SODIUM 40 MG/1
40 TABLET, DELAYED RELEASE ORAL 2 TIMES DAILY
Qty: 60 TABLET | Refills: 0 | Status: SHIPPED | OUTPATIENT
Start: 2020-02-28 | End: 2020-12-31

## 2020-02-28 RX ORDER — PANTOPRAZOLE SODIUM 40 MG/1
40 TABLET, DELAYED RELEASE ORAL 2 TIMES DAILY
Status: DISCONTINUED | OUTPATIENT
Start: 2020-02-28 | End: 2020-02-28 | Stop reason: HOSPADM

## 2020-02-28 RX ORDER — METOPROLOL TARTRATE 25 MG/1
25 TABLET, FILM COATED ORAL 2 TIMES DAILY
Qty: 60 TABLET | Refills: 0 | Status: SHIPPED | OUTPATIENT
Start: 2020-02-28 | End: 2020-03-29

## 2020-02-28 RX ORDER — ENOXAPARIN SODIUM 100 MG/ML
100 INJECTION SUBCUTANEOUS EVERY 12 HOURS
Qty: 10 ML | Refills: 0 | Status: SHIPPED | OUTPATIENT
Start: 2020-02-28 | End: 2020-03-04

## 2020-02-28 RX ORDER — LISINOPRIL 10 MG/1
10 TABLET ORAL DAILY
Qty: 30 TABLET | Refills: 0 | Status: SHIPPED | OUTPATIENT
Start: 2020-02-28 | End: 2020-03-29

## 2020-02-28 RX ORDER — HYDRALAZINE HYDROCHLORIDE 20 MG/ML
10 INJECTION INTRAMUSCULAR; INTRAVENOUS EVERY 8 HOURS PRN
Status: DISCONTINUED | OUTPATIENT
Start: 2020-02-28 | End: 2020-02-28 | Stop reason: HOSPADM

## 2020-02-28 RX ORDER — LISINOPRIL 10 MG/1
10 TABLET ORAL DAILY
Status: DISCONTINUED | OUTPATIENT
Start: 2020-02-28 | End: 2020-02-28 | Stop reason: HOSPADM

## 2020-02-28 RX ORDER — PROPOFOL 10 MG/ML
VIAL (ML) INTRAVENOUS
Status: DISCONTINUED | OUTPATIENT
Start: 2020-02-28 | End: 2020-02-28

## 2020-02-28 RX ORDER — SODIUM CHLORIDE 9 MG/ML
INJECTION, SOLUTION INTRAVENOUS CONTINUOUS PRN
Status: DISCONTINUED | OUTPATIENT
Start: 2020-02-28 | End: 2020-02-28

## 2020-02-28 RX ADMIN — LISINOPRIL 10 MG: 10 TABLET ORAL at 10:02

## 2020-02-28 RX ADMIN — PROPOFOL 20 MG: 10 INJECTION, EMULSION INTRAVENOUS at 07:02

## 2020-02-28 RX ADMIN — METOPROLOL TARTRATE 25 MG: 25 TABLET ORAL at 10:02

## 2020-02-28 RX ADMIN — HYDRALAZINE HYDROCHLORIDE 10 MG: 20 INJECTION INTRAMUSCULAR; INTRAVENOUS at 12:02

## 2020-02-28 RX ADMIN — ASPIRIN 81 MG: 81 TABLET, DELAYED RELEASE ORAL at 10:02

## 2020-02-28 RX ADMIN — HYDROCODONE BITARTRATE AND ACETAMINOPHEN 1 TABLET: 5; 325 TABLET ORAL at 01:02

## 2020-02-28 RX ADMIN — PROPOFOL 40 MG: 10 INJECTION, EMULSION INTRAVENOUS at 07:02

## 2020-02-28 RX ADMIN — PANTOPRAZOLE SODIUM 40 MG: 40 TABLET, DELAYED RELEASE ORAL at 10:02

## 2020-02-28 RX ADMIN — SODIUM CHLORIDE: 9 INJECTION, SOLUTION INTRAVENOUS at 07:02

## 2020-02-28 NOTE — PLAN OF CARE
02/27/20 1933   Patient Assessment/Suction   Level of Consciousness (AVPU) alert   Respiratory Effort Normal;Unlabored   Expansion/Accessory Muscles/Retractions no use of accessory muscles   All Lung Fields Breath Sounds clear;diminished   PRE-TX-O2   O2 Device (Oxygen Therapy) room air   SpO2 (!) 93 %   Pulse Oximetry Type Intermittent   $ Pulse Oximetry - Multiple Charge Pulse Oximetry - Multiple   Pulse 80   Resp (!) 22   Aerosol Therapy   $ Aerosol Therapy Charges PRN treatment not required   Daily Review of Necessity (SVN) completed   Respiratory Treatment Status (SVN) PRN treatment not required   continue tx. As ordered

## 2020-02-28 NOTE — NURSING
Plan of care reviewed with pt. Strict I&Os. Pain controlled with PRN meds. NPO for EGD on 2/28. Bed alarm on and safety precautions maintained.

## 2020-02-28 NOTE — DISCHARGE SUMMARY
"[facility] Medicine  Discharge Summary    DOS: 02/28/2020      Patient Name: Ric Guzmán  MRN: [id number]  Admission Date: 2/26/2020  Hospital Length of Stay: 2 days  Discharge Date and Time:  02/28/2020 4:47 PM  Attending Physician: Flaca att. 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  Upon further asking patient mentioned that he was in his usual state of health until couple days ago when he started experiencing dyspnea on exertion, feeling very fatigued and low energy.  Last night he went to bed being very fatigued and woke up in the middle of the night with severe shortness of breath and could not sleep and finally decided to come to ED.  He describes his chest pain as midsternal discomfort more pronounced upon activity.  Otherwise he denies any fever, chills, headache, dizziness, palpitations, nausea, vomiting, bladder or bowel symptoms.  He suffers from chronic low back pain and has outpatient pain clinic follow-up.  Patient tells me that in June 2019 he was told that he has congestive heart failure and had "lot of fluid around his lungs!"    In ED, patient was hypoxic with oxygen saturation in 80s while sitting and his blood pressure was very elevated.  CTA chest ruled out pulmonary embolism.     Patient denies smoking, heavy alcohol consumption or any recreational drug use       Procedure(s) (LRB):  ENTEROSCOPY (Left)      Hospital Course:   02/27:  Patient was seen and examined bedside.  He diuresed extremely well yesterday with 20 mg IV Lasix and blood pressure is better controlled.  His breathing comfortably on room air however still gets short winded upon minimal activity.  No acute events overnight as per nursing staff.  " Denies any hematemesis or melena.  Today INR is 3.6.     02/28:  Patient was seen and examined bedside after EGD.  Denies any new symptoms.  He reports significant improvement in his shortness of breath and weakness after getting blood and some Lasix.  He was cleared for discharge by Cardiology and GI.     In summary,  During his hospital stay patient was treated for symptomatic anemia due to iron deficiency.  Cardiology was consulted but overall his cardiac function came out okay and there was no need for any ischemic workup at this point.  Patient had significant GI history with perforated ulcer and questionable GI malignancy in the past.  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  GI was consulted who perform EGD today and patient was found to have multiple nonbleeding ulcers and polyps.  Today I discussed case with GI who recommended outpatient colonoscopy on March 2nd after holding anticoagulation for at least 72 hr.  Patient was discharged home with instructions to hold Xarelto until procedure and we provided script for bridging dose of Lovenox.  He was advised to not take Lovenox night prior to procedure.  He was also discharged on Protonix b.i.d..  Patient was advised to follow up with her PCP and regular cardiologist.     Review of systems:  Mild headache otherwise denies any new symptoms    Physical Exam   Constitutional: He is oriented to person, place, and time. He appears well-developed and well-nourished.   Very pleasant elderly gentleman in no acute distress except fatigue.   HENT:   Head: Atraumatic.   Mouth/Throat: Oropharynx is clear and moist.   Eyes: Pupils are equal, round, and reactive to light. EOM are normal.   Neck: Normal range of motion. Neck supple. No JVD present.   Cardiovascular: Normal rate, regular rhythm and normal heart sounds. Exam reveals no gallop.   No murmur heard.  Pulmonary/Chest: Effort normal and breath sounds normal. He has no  wheezes.   On room air, mild tachypnea, mild crackles in bilateral bases   Abdominal: Soft. Bowel sounds are normal. He exhibits no distension. There is no rebound and no guarding.   Musculoskeletal: Normal range of motion. He exhibits no edema.   Lymphadenopathy: He has no cervical adenopathy.   Neurological: He is alert and oriented to person, place, and time. No cranial nerve deficit.   Skin: Skin is warm and dry. Capillary refill takes less than 2 seconds. No rash noted.   Psychiatric: His behavior is normal.     Nursing note and vitals reviewed.     Consults:   Consults (From admission, onward)        Status Ordering Provider     Inpatient consult to Cardiology  Once     Provider:  Morgan Bentley MD    Acknowledged DAMIEN REYES     Inpatient consult to Gastroenterology  Once     Provider:  Juan Whiteside III, MD    Completed DAMIEN REYES          No new Assessment & Plan notes have been filed under this hospital service since the last note was generated.  Service: Hospital Medicine    Final Active Diagnoses:    Diagnosis Date Noted POA    Iron deficiency anemia [D50.9] 02/26/2020 Yes    Essential hypertension [I10] 11/25/2019 Yes    Type 2 diabetes mellitus without complication, without long-term current use of insulin [E11.9] 11/25/2019 Yes    Lumbar radiculitis [M54.16] 12/10/2018 Yes      Problems Resolved During this Admission:    Diagnosis Date Noted Date Resolved POA    PRINCIPAL PROBLEM:  Acute on chronic diastolic heart failure [I50.33] 02/27/2020 02/28/2020 Yes    Hypoxia [R09.02] 02/26/2020 02/28/2020 Yes       Discharged Condition: good    Disposition: Home or Self Care    Follow Up:  Follow-up Information     Justin Ward MD On 3/2/2020.    Specialty:  Gastroenterology  Contact information:  [address] [facility] [address]  [phone]             Dave Enriquez MD In 1 week.    Specialty:  Family Medicine  Contact information:  [address]  [address]  [place] [address]  [phone]             Morgan Bentley MD In 2 weeks.    Specialties:  Cardiovascular Disease, Cardiology  Contact information:  [address] [place] [address]  [address]  [facility] [address]  [phone]                 Patient Instructions:      Diet Cardiac     Diet diabetic     Notify your health care provider if you experience any of the following:  persistent nausea and vomiting or diarrhea     Notify your health care provider if you experience any of the following:  persistent dizziness, light-headedness, or visual disturbances     Notify your health care provider if you experience any of the following:  difficulty breathing or increased cough     Activity as tolerated       Significant Diagnostic Studies: Labs:   BMP:   Recent Labs   Lab 02/27/20  0334 02/28/20  0506   * 188*    135*   K 3.9 4.2    100   CO2 23 26   BUN 25* 33*   CREATININE 1.1 1.2   CALCIUM 8.4* 8.6*   MG 2.0 1.9       Pending Diagnostic Studies:     Procedure Component Value Units Date/Time    Protime-INR [[id number]] Collected:  02/26/20 0906    Order Status:  Sent Lab Status:  In process Updated:  02/26/20 0908    Specimen:  Blood     Specimen to Pathology - Surgery [[id number]] Collected:  02/28/20 0754    Order Status:  Sent Lab Status:  No result     Specimen:  Tissue          Medications:  Reconciled Home Medications:      Medication List      START taking these medications    enoxaparin 100 mg/mL Syrg  Commonly known as:  LOVENOX  Inject 1 mL (100 mg total) into the skin every 12 (twelve) hours. for 5 days     metoprolol tartrate 25 MG tablet  Commonly known as:  LOPRESSOR  Take 1 tablet (25 mg total) by mouth 2 (two) times daily.        CHANGE how you take these medications    lisinopril 10 MG tablet  Take 1 tablet (10 mg total) by mouth once daily.  What changed:    · medication strength  · how much to take     pantoprazole 40 MG tablet  Commonly known as:  PROTONIX  Take 1 tablet (40 mg total)  by mouth 2 (two) times daily.  What changed:    · when to take this  · additional instructions        CONTINUE taking these medications    EYE VITAMIN AND MINERALS ORAL  Take 1 capsule by mouth once daily.     glimepiride 2 MG tablet  Commonly known as:  AMARYL  Take 2 mg by mouth once daily.     Januvia 100 MG Tab  Generic drug:  SITagliptin  Take 100 mg by mouth once daily.     rivaroxaban 20 mg Tab  Commonly known as:  Xarelto  Take 1 tablet (20 mg total) by mouth every evening.            Indwelling Lines/Drains at time of discharge:   Lines/Drains/Airways     None                 Time spent on the discharge of patient: 26 minutes  Patient was seen and examined on the date of discharge and determined to be suitable for discharge.         Jules Owens MD  Department of Hospital Medicine  [facility]

## 2020-02-28 NOTE — PROGRESS NOTES
Cardiac Rehab     Ric Guzmán   [id number]   2/28/2020         Cardiac Rehab Phase Taught: Phase 1    Teaching Method: Verbal, Written    Handouts: CHF Zone and Diet Packet    Educational Videos: None    Understanding:  Verbalize understanding    Comments: Education on chf provided. Reinforced daily weights, low sodium diet and medication compliance. Pt states he lives in [place] and will be returning home April 1.    Total Time Spent: 15 mins            Sandra Blair RN

## 2020-02-28 NOTE — CONSULTS
Chief Complaint:  anemia    HPI:    84-year-old male with history of DVT post IVC filter on Xarelto presented to hospital with shortness of breath and fatigue. Found to have moderate new onset normocytic anemia with associated iron deficiency without overt bleeding in setting of history of prior perforated peptic ulcer disease and history of colon cancer with resection approximately 3-4 years ago.  Pt denies melena, hematochezia, hemoptysis.  Feels tired but no abdominal pain     Results for DOREEN GAMBOA (MRN [id number]) as of 2/28/2020 07:32   Ref. Range 1/15/2019 12:21 2/26/2020 09:06 2/27/2020 03:34 2/28/2020 04:33   Hemoglobin Latest Ref Range: 14.0 - 18.0 g/dL 12.5 (L) 8.4 (L) 8.9 (L) 9.5 (L)   Hematocrit Latest Ref Range: 40.0 - 54.0 % 37.9 (L) 28.1 (L) 28.4 (L) 29.8 (L)       Review of Systems:  Complete ROS performed and negative unless stated above in HPI    Past Medical History:   Diagnosis Date    Allergy     Anticoagulant long-term use     Arthritis     Cancer     Cataract     CHF (congestive heart failure)     Clotting disorder     Diabetes mellitus, type 2     Encounter for blood transfusion     Hypertension     Iron deficiency anemia 2/26/2020    Lumbar radiculitis 12/10/2018    Ulcer        Past Surgical History:   Procedure Laterality Date    CAUDAL EPIDURAL STEROID INJECTION N/A 12/10/2018    Procedure: Injection-steroid-epidural-caudal;  Surgeon: Joe Matos MD;  Location: [facility] to [facility];  Service: Pain Management;  Laterality: N/A;  caudal CIARA     COLON SURGERY      EYE SURGERY      FRACTURE SURGERY      HERNIA REPAIR      INJECTION OF ANESTHETIC AGENT AROUND LATERAL BRANCH NERVES OF SACROILIAC JOINT Bilateral 1/17/2019    Procedure: BLOCK, NERVE, SACROILIAC JOINT, LATERAL BRANCH;  Surgeon: Joe Matos MD;  Location: [facility] to [facility];  Service: Pain Management;  Laterality: Bilateral;  bilateral SI joint injection    JOINT REPLACEMENT      RADIOFREQUENCY ABLATION Left 3/1/2019     Procedure: Radiofrequency Ablation;  Surgeon: Joe Matos MD;  Location: [facility];  Service: Pain Management;  Laterality: Left;  L5, S1, S2    SPINE SURGERY      TONSILLECTOMY      VASECTOMY         History reviewed. No pertinent family history.    Social History     Socioeconomic History    Marital status:      Spouse name: Not on file    Number of children: Not on file    Years of education: Not on file    Highest education level: Not on file   Occupational History    Not on file   Social Needs    Financial resource strain: Not on file    Food insecurity:     Worry: Not on file     Inability: Not on file    Transportation needs:     Medical: Not on file     Non-medical: Not on file   Tobacco Use    Smoking status: Former Smoker    Smokeless tobacco: Former User   Substance and Sexual Activity    Alcohol use: Not Currently     Alcohol/week: 4.0 standard drinks     Types: 4 Cans of beer per week    Drug use: No    Sexual activity: Never   Lifestyle    Physical activity:     Days per week: Not on file     Minutes per session: Not on file    Stress: Not on file   Relationships    Social connections:     Talks on phone: Not on file     Gets together: Not on file     Attends [employer] service: Not on file     Active member of club or organization: Not on file     Attends meetings of clubs or organizations: Not on file     Relationship status: Not on file   Other Topics Concern    Not on file   Social History Narrative    Not on file       Review of patient's allergies indicates:   Allergen Reactions    Cymbalta [duloxetine]      Passes out    Nsaids (non-steroidal anti-inflammatory drug)      Other reaction(s): gi distress  Previous ulcer       No current facility-administered medications on file prior to encounter.      Current Outpatient Medications on File Prior to Encounter   Medication Sig Dispense Refill    glimepiride (AMARYL) 2 MG tablet Take 2 mg by mouth once daily.        "JANUVIA 100 mg Tab Take 100 mg by mouth once daily.       lisinopril (PRINIVIL,ZESTRIL) 2.5 MG tablet Take 1 tablet (2.5 mg total) by mouth once daily. 90 tablet 3    pantoprazole (PROTONIX) 40 MG tablet TAKE 1 TABLET DAILY (Patient taking differently: Take 40 mg by mouth once daily. ) 90 tablet 4    vit A/C/E ac/ZnOx/cupric oxide (EYE VITAMIN AND MINERALS ORAL) Take 1 capsule by mouth once daily.       XARELTO 20 mg Tab TAKE 1 TABLET DAILY (Patient taking differently: Take 20 mg by mouth every evening. ) 90 tablet 3       Objective:  BP (!) 156/66 (BP Location: Left arm, Patient Position: Lying)   Pulse 75   Temp 98.1 °F (36.7 °C) (Oral)   Resp 20   Ht 6' 3" (1.905 m)   Wt 105.6 kg (232 lb 12.9 oz)   SpO2 96%   BMI 29.10 kg/m²   General: wd, wn, nad  HE: ncat, perrl, eomi  ENT: op pink and moist without lesions or exudates  CV: +s1s2, no mrg, rrr  Resp: ctapb, no wrr  GI: bs active, abd soft, nt, nd  Skin: no lesions, no rash  Neuro: cn 2-12 in tact, no focal deficits, no asterixis  Psych: regular rate speech, normal affect    Labs:  Recent Results (from the past 2688 hour(s))   CBC auto differential    Collection Time: 02/26/20  9:06 AM   Result Value Ref Range    WBC 12.16 3.90 - 12.70 K/uL    RBC 3.28 (L) 4.60 - 6.20 M/uL    Hemoglobin 8.4 (L) 14.0 - 18.0 g/dL    Hematocrit 28.1 (L) 40.0 - 54.0 %    Mean Corpuscular Volume 86 82 - 98 fL    Mean Corpuscular Hemoglobin 25.6 (L) 27.0 - 31.0 pg    Mean Corpuscular Hemoglobin Conc 29.9 (L) 32.0 - 36.0 g/dL    RDW 17.2 (H) 11.5 - 14.5 %    Platelets 252 150 - 350 K/uL    MPV 12.0 9.2 - 12.9 fL    Immature Granulocytes 0.9 (H) 0.0 - 0.5 %    Gran # (ANC) 10.1 (H) 1.8 - 7.7 K/uL    Immature Grans (Abs) 0.11 (H) 0.00 - 0.04 K/uL    Lymph # 0.4 (L) 1.0 - 4.8 K/uL    Mono # 1.4 (H) 0.3 - 1.0 K/uL    Eos # 0.1 0.0 - 0.5 K/uL    Baso # 0.06 0.00 - 0.20 K/uL    nRBC 0 0 /100 WBC    Gran% 83.1 (H) 38.0 - 73.0 %    Lymph% 3.4 (L) 18.0 - 48.0 %    Mono% 11.7 4.0 - " 15.0 %    Eosinophil% 0.4 0.0 - 8.0 %    Basophil% 0.5 0.0 - 1.9 %    Differential Method Automated    Comprehensive metabolic panel    Collection Time: 02/26/20  9:06 AM   Result Value Ref Range    Sodium 138 136 - 145 mmol/L    Potassium 4.0 3.5 - 5.1 mmol/L    Chloride 103 95 - 110 mmol/L    CO2 25 23 - 29 mmol/L    Glucose 227 (H) 70 - 110 mg/dL    BUN, Bld 19 8 - 23 mg/dL    Creatinine 1.0 0.5 - 1.4 mg/dL    Calcium 8.5 (L) 8.7 - 10.5 mg/dL    Total Protein 7.2 6.0 - 8.4 g/dL    Albumin 3.6 3.5 - 5.2 g/dL    Total Bilirubin 0.8 0.1 - 1.0 mg/dL    Alkaline Phosphatase 59 55 - 135 U/L    AST 19 10 - 40 U/L    ALT 18 10 - 44 U/L    Anion Gap 10 8 - 16 mmol/L    eGFR if African American >60.0 >60 mL/min/1.73 m^2    eGFR if non African American >60.0 >60 mL/min/1.73 m^2   Brain natriuretic peptide    Collection Time: 02/26/20  9:06 AM   Result Value Ref Range     (H) 0 - 99 pg/mL   Troponin I    Collection Time: 02/26/20  9:06 AM   Result Value Ref Range    Troponin I <0.030 <=0.040 ng/mL   Iron and TIBC    Collection Time: 02/26/20  9:06 AM   Result Value Ref Range    Iron 41 (L) 45 - 160 ug/dL    Transferrin 296 200 - 375 mg/dL    TIBC 414 250 - 450 ug/dL    Saturated Iron 10 (L) 20 - 50 %   Ferritin    Collection Time: 02/26/20  9:06 AM   Result Value Ref Range    Ferritin 12 (L) 20.0 - 300.0 ng/mL   Hemoglobin A1c    Collection Time: 02/26/20  9:06 AM   Result Value Ref Range    Hemoglobin A1C 7.2 (H) 4.5 - 6.2 %    Estimated Avg Glucose 160 (H) 68 - 131 mg/dL   Prepare RBC    Collection Time: 02/26/20 11:46 AM   Result Value Ref Range    UNIT NUMBER [id number]     Product Code Q1594A38     DISPENSE STATUS TRANSFUSED     CODING SYSTEM EEYS890     Unit Blood Type Code 6200     Unit Blood Type A POS     Unit Expiration [id number]     UNIT NUMBER [id number]     Product Code S2051Q85     DISPENSE STATUS CROSSMATCHED     CODING SYSTEM MOVQ112     Unit Blood Type Code 6200     Unit Blood Type A POS      Unit Expiration [id number]    Type & Screen    Collection Time: 02/26/20 12:29 PM   Result Value Ref Range    Group & Rh A POS     Indirect Danay NEG    Occult blood x 1, stool    Collection Time: 02/26/20 12:29 PM   Result Value Ref Range    Occult Blood Negative Negative   Echo Color Flow Doppler? Yes    Collection Time: 02/26/20  1:42 PM   Result Value Ref Range    BSA 2.35 m2    TDI SEPTAL 0.10 m/s    LV LATERAL E/E' RATIO 7.62 m/s    LV SEPTAL E/E' RATIO 9.90 m/s    Right Atrial Pressure (from IVC) 8 mmHg    AORTIC VALVE CUSP SEPERATION 2.28 cm    TDI LATERAL 0.13 m/s    PV PEAK VELOCITY 109.51 cm/s    LVIDD 5.21 3.5 - 6.0 cm    IVS 1.18 (A) 0.6 - 1.1 cm    PW 1.18 (A) 0.6 - 1.1 cm    Ao root annulus 4.00 cm    LVIDS 3.09 2.1 - 4.0 cm    FS 41 28 - 44 %    LV mass 243.86 g    LA size 3.48 cm    Left Ventricle Relative Wall Thickness 0.45 cm    AV mean gradient 3 mmHg    AV valve area 3.16 cm2    AV Velocity Ratio 71.15     AV index (prosthetic) 0.68     E/A ratio 1.74     Mean e' 0.12 m/s    E wave decelartion time 140.89 msec    LVOT diameter 2.43 cm    LVOT area 4.6 cm2    LVOT peak hema 79.69 m/s    LVOT peak VTI 15.89 cm    Ao peak hema 1.12 m/s    Ao VTI 23.32 cm    LVOT stroke volume 73.66 cm3    AV peak gradient 5 mmHg    TV rest pulmonary artery pressure 69 mmHg    E/E' ratio 8.61 m/s    MV Peak E Hema 0.99 m/s    TR Max Hema 3.89 m/s    MV Peak A Hema 0.57 m/s    LV Mass Index 105 g/m2    Triscuspid Valve Regurgitation Peak Gradient 61 mmHg   POCT glucose    Collection Time: 02/26/20  4:50 PM   Result Value Ref Range    POC Glucose 413 (H) 70 - 110   POCT glucose    Collection Time: 02/26/20  9:04 PM   Result Value Ref Range    POC Glucose 312 (H) 70 - 110   Comprehensive Metabolic Panel (CMP)    Collection Time: 02/27/20  3:34 AM   Result Value Ref Range    Sodium 137 136 - 145 mmol/L    Potassium 3.9 3.5 - 5.1 mmol/L    Chloride 105 95 - 110 mmol/L    CO2 23 23 - 29 mmol/L    Glucose 240 (H) 70 - 110  mg/dL    BUN, Bld 25 (H) 8 - 23 mg/dL    Creatinine 1.1 0.5 - 1.4 mg/dL    Calcium 8.4 (L) 8.7 - 10.5 mg/dL    Total Protein 6.8 6.0 - 8.4 g/dL    Albumin 3.2 (L) 3.5 - 5.2 g/dL    Total Bilirubin 1.3 (H) 0.1 - 1.0 mg/dL    Alkaline Phosphatase 54 (L) 55 - 135 U/L    AST 18 10 - 40 U/L    ALT 18 10 - 44 U/L    Anion Gap 9 8 - 16 mmol/L    eGFR if African American >60.0 >60 mL/min/1.73 m^2    eGFR if non African American >60.0 >60 mL/min/1.73 m^2   Magnesium    Collection Time: 02/27/20  3:34 AM   Result Value Ref Range    Magnesium 2.0 1.6 - 2.6 mg/dL   Phosphorus    Collection Time: 02/27/20  3:34 AM   Result Value Ref Range    Phosphorus 3.3 2.7 - 4.5 mg/dL   CBC with Automated Differential    Collection Time: 02/27/20  3:34 AM   Result Value Ref Range    WBC 9.31 3.90 - 12.70 K/uL    RBC 3.44 (L) 4.60 - 6.20 M/uL    Hemoglobin 8.9 (L) 14.0 - 18.0 g/dL    Hematocrit 28.4 (L) 40.0 - 54.0 %    Mean Corpuscular Volume 83 82 - 98 fL    Mean Corpuscular Hemoglobin 25.9 (L) 27.0 - 31.0 pg    Mean Corpuscular Hemoglobin Conc 31.3 (L) 32.0 - 36.0 g/dL    RDW 16.8 (H) 11.5 - 14.5 %    Platelets 222 150 - 350 K/uL    MPV 11.7 9.2 - 12.9 fL    Immature Granulocytes 0.8 (H) 0.0 - 0.5 %    Gran # (ANC) 7.5 1.8 - 7.7 K/uL    Immature Grans (Abs) 0.07 (H) 0.00 - 0.04 K/uL    Lymph # 0.7 (L) 1.0 - 4.8 K/uL    Mono # 1.1 (H) 0.3 - 1.0 K/uL    Eos # 0.0 0.0 - 0.5 K/uL    Baso # 0.02 0.00 - 0.20 K/uL    nRBC 0 0 /100 WBC    Gran% 80.2 (H) 38.0 - 73.0 %    Lymph% 7.1 (L) 18.0 - 48.0 %    Mono% 11.7 4.0 - 15.0 %    Eosinophil% 0.0 0.0 - 8.0 %    Basophil% 0.2 0.0 - 1.9 %    Differential Method Automated    PT/INR    Collection Time: 02/27/20  3:34 AM   Result Value Ref Range    PT 34.6 (H) 10.6 - 14.8 sec    INR 3.6    Lipid panel    Collection Time: 02/27/20  3:34 AM   Result Value Ref Range    Cholesterol 146 120 - 199 mg/dL    Triglycerides 39 30 - 150 mg/dL    HDL 42 40 - 75 mg/dL    LDL Cholesterol 96.2 63.0 - 159.0 mg/dL     Hdl/Cholesterol Ratio 28.8 20.0 - 50.0 %    Total Cholesterol/HDL Ratio 3.5 2.0 - 5.0    Non-HDL Cholesterol 104 mg/dL   Troponin I    Collection Time: 02/27/20  3:34 AM   Result Value Ref Range    Troponin I 0.032 <=0.040 ng/mL   POCT glucose    Collection Time: 02/27/20  6:02 AM   Result Value Ref Range    POC Glucose 236 (H) 70 - 110   POCT glucose    Collection Time: 02/27/20  4:23 PM   Result Value Ref Range    POC Glucose 198 (H) 70 - 110   POCT glucose    Collection Time: 02/27/20  8:37 PM   Result Value Ref Range    POC Glucose 188 (H) 70 - 110   CBC with Automated Differential    Collection Time: 02/28/20  4:33 AM   Result Value Ref Range    WBC 16.08 (H) 3.90 - 12.70 K/uL    RBC 3.62 (L) 4.60 - 6.20 M/uL    Hemoglobin 9.5 (L) 14.0 - 18.0 g/dL    Hematocrit 29.8 (L) 40.0 - 54.0 %    Mean Corpuscular Volume 82 82 - 98 fL    Mean Corpuscular Hemoglobin 26.2 (L) 27.0 - 31.0 pg    Mean Corpuscular Hemoglobin Conc 31.9 (L) 32.0 - 36.0 g/dL    RDW 17.0 (H) 11.5 - 14.5 %    Platelets 255 150 - 350 K/uL    MPV 12.4 9.2 - 12.9 fL    Immature Granulocytes 0.9 (H) 0.0 - 0.5 %    Gran # (ANC) 12.9 (H) 1.8 - 7.7 K/uL    Immature Grans (Abs) 0.14 (H) 0.00 - 0.04 K/uL    Lymph # 1.0 1.0 - 4.8 K/uL    Mono # 1.8 (H) 0.3 - 1.0 K/uL    Eos # 0.2 0.0 - 0.5 K/uL    Baso # 0.07 0.00 - 0.20 K/uL    nRBC 0 0 /100 WBC    Gran% 79.8 (H) 38.0 - 73.0 %    Lymph% 6.5 (L) 18.0 - 48.0 %    Mono% 11.3 4.0 - 15.0 %    Eosinophil% 1.1 0.0 - 8.0 %    Basophil% 0.4 0.0 - 1.9 %    Differential Method Automated    Comprehensive Metabolic Panel (CMP)    Collection Time: 02/28/20  5:06 AM   Result Value Ref Range    Sodium 135 (L) 136 - 145 mmol/L    Potassium 4.2 3.5 - 5.1 mmol/L    Chloride 100 95 - 110 mmol/L    CO2 26 23 - 29 mmol/L    Glucose 188 (H) 70 - 110 mg/dL    BUN, Bld 33 (H) 8 - 23 mg/dL    Creatinine 1.2 0.5 - 1.4 mg/dL    Calcium 8.6 (L) 8.7 - 10.5 mg/dL    Total Protein 7.4 6.0 - 8.4 g/dL    Albumin 3.6 3.5 - 5.2 g/dL    Total  Bilirubin 1.2 (H) 0.1 - 1.0 mg/dL    Alkaline Phosphatase 61 55 - 135 U/L    AST 18 10 - 40 U/L    ALT 19 10 - 44 U/L    Anion Gap 9 8 - 16 mmol/L    eGFR if African American >60.0 >60 mL/min/1.73 m^2    eGFR if non African American 55.6 (A) >60 mL/min/1.73 m^2   Magnesium    Collection Time: 02/28/20  5:06 AM   Result Value Ref Range    Magnesium 1.9 1.6 - 2.6 mg/dL   Phosphorus    Collection Time: 02/28/20  5:06 AM   Result Value Ref Range    Phosphorus 3.3 2.7 - 4.5 mg/dL   POCT glucose    Collection Time: 02/28/20  5:41 AM   Result Value Ref Range    POC Glucose 178 (H) 70 - 110          Assessment:  84 M with history of colon cancer with resection 3-4 years ago, perforated ulcer about 1 year ago now with chronic normocytic anemia in setting of xarelto    Plan:  Push enteroscopy today  Can arrange outpatient colonoscopy off anticoagulation if can hold for 3 days

## 2020-02-28 NOTE — PLAN OF CARE
Problem: Fall Injury Risk  Goal: Absence of Fall and Fall-Related Injury  Outcome: Ongoing, Progressing     Problem: Adult Inpatient Plan of Care  Goal: Plan of Care Review  Outcome: Ongoing, Progressing  Goal: Patient-Specific Goal (Individualization)  Outcome: Ongoing, Progressing  Goal: Absence of Hospital-Acquired Illness or Injury  Outcome: Ongoing, Progressing  Goal: Optimal Comfort and Wellbeing  Outcome: Ongoing, Progressing  Goal: Readiness for Transition of Care  Outcome: Ongoing, Progressing  Goal: Rounds/Family Conference  Outcome: Ongoing, Progressing     Problem: Diabetes Comorbidity  Goal: Blood Glucose Level Within Desired Range  Outcome: Ongoing, Progressing     Problem: Wound  Goal: Optimal Wound Healing  Outcome: Ongoing, Progressing

## 2020-02-28 NOTE — [employer] PATIENT INSTRUCTIONS
Discharge Summary/Instructions after an Endoscopic Procedure  Patient Name: Ric Ramos  Patient MRN: [id number]  Patient YOB: 1935 Friday, February 28, 2020  Justin Ward MD  RESTRICTIONS:  During your procedure today, you received medications for sedation.  These   medications may affect your judgment, balance and coordination.  Therefore,   for 24 hours, you have the following restrictions:   - DO NOT drive a car, operate machinery, make legal/financial decisions,   sign important papers or drink alcohol.    ACTIVITY:  Today: no heavy lifting, straining or running due to procedural   sedation/anesthesia.  The following day: return to full activity including work.  DIET:  Eat and drink normally unless instructed otherwise.     TREATMENT FOR COMMON SIDE EFFECTS:  - Mild abdominal pain, nausea, belching, bloating or excessive gas:  rest,   eat lightly and use a heating pad.  - Sore Throat: treat with throat lozenges and/or gargle with warm salt   water.  - Because air was used during the procedure, expelling large amounts of air   from your rectum or belching is normal.  - If a bowel prep was taken, you may not have a bowel movement for 1-3 days.    This is normal.  SYMPTOMS TO WATCH FOR AND REPORT TO YOUR PHYSICIAN:  1. Abdominal pain or bloating, other than gas cramps.  2. Chest pain.  3. Back pain.  4. Signs of infection such as: chills or fever occurring within 24 hours   after the procedure.  5. Rectal bleeding, which would show as bright red, maroon, or black stools.   (A tablespoon of blood from the rectum is not serious, especially if   hemorrhoids are present.)  6. Vomiting.  7. Weakness or dizziness.  GO DIRECTLY TO THE NEAREST EMERGENCY ROOM IF YOU HAVE ANY OF THE FOLLOWING:      Difficulty breathing              Chills and/or fever over 101 F   Persistent vomiting and/or vomiting blood   Severe abdominal pain   Severe chest pain   Black, tarry stools   Bleeding- more than one tablespoon   Any  other symptom or condition that you feel may need urgent attention  Your doctor recommends these additional instructions:  If any biopsies were taken, your doctors clinic will contact you in 1 to 2   weeks with any results.  Recommend ppi bid and can repeat egd with colonoscopy on monday as   outpatient off xarelto for total of 72 hours to removed gastric polyps and   any colonic polyps   - Return patient to hospital short for ongoing care.  For questions, problems or results please call your physician - Justin Ward MD at Work:  [phone].  [facility], EMERGENCY ROOM PHONE NUMBER: [phone]  IF A COMPLICATION OR EMERGENCY SITUATION ARISES AND YOU ARE UNABLE TO REACH   YOUR PHYSICIAN - GO DIRECTLY TO THE EMERGENCY ROOM.  MD Justin Sampson MD  2/28/2020 7:58:34 AM  This report has been verified and signed electronically.  [employer]

## 2020-02-28 NOTE — TRANSFER OF CARE
"Anesthesia Transfer of Care Note    Patient: Ric Guzmán    Procedure(s) Performed: Procedure(s) (LRB):  ENTEROSCOPY (Left)    Patient location: GI    Anesthesia Type: MAC    Post pain: adequate analgesia    Post assessment: no apparent anesthetic complications    Post vital signs: stable    Level of consciousness: awake and alert    Nausea/Vomiting: no nausea/vomiting    Complications: none    Transfer of care protocol was followed      Last vitals:   Visit Vitals  BP (!) 156/66 (BP Location: Left arm, Patient Position: Lying)   Pulse 75   Temp 36.7 °C (98.1 °F) (Oral)   Resp 20   Ht 6' 3" (1.905 m)   Wt 105.6 kg (232 lb 12.9 oz)   SpO2 96%   BMI 29.10 kg/m²     "

## 2020-02-28 NOTE — ANESTHESIA PREPROCEDURE EVALUATION
02/28/2020  Ric Guzmán is a 84 y.o., male   Patient Active Problem List   Diagnosis    Other spondylosis, lumbar region    Lumbar radiculitis    Chronic SI joint pain    Back pain    Type 2 diabetes mellitus without complication, without long-term current use of insulin    Essential hypertension    Hypoxia    Iron deficiency anemia    Acute on chronic diastolic heart failure       Past Surgical History:   Procedure Laterality Date    CAUDAL EPIDURAL STEROID INJECTION N/A 12/10/2018    Procedure: Injection-steroid-epidural-caudal;  Surgeon: Joe Matos MD;  Location: [facility] OR;  Service: Pain Management;  Laterality: N/A;  caudal CIARA     COLON SURGERY      EYE SURGERY      FRACTURE SURGERY      HERNIA REPAIR      INJECTION OF ANESTHETIC AGENT AROUND LATERAL BRANCH NERVES OF SACROILIAC JOINT Bilateral 1/17/2019    Procedure: BLOCK, NERVE, SACROILIAC JOINT, LATERAL BRANCH;  Surgeon: Joe Matos MD;  Location: [facility] OR;  Service: Pain Management;  Laterality: Bilateral;  bilateral SI joint injection    JOINT REPLACEMENT      RADIOFREQUENCY ABLATION Left 3/1/2019    Procedure: Radiofrequency Ablation;  Surgeon: Joe Matos MD;  Location: [facility] OR;  Service: Pain Management;  Laterality: Left;  L5, S1, S2    SPINE SURGERY      TONSILLECTOMY      VASECTOMY          Tobacco Use:  The patient  reports that he has quit smoking. He has quit using smokeless tobacco.     Results for orders placed or performed during the hospital encounter of 02/26/20   EKG 12-lead    Collection Time: 02/26/20  8:33 AM    Narrative    Test Reason : R07.9,    Vent. Rate : 095 BPM     Atrial Rate : 095 BPM     P-R Int : 164 ms          QRS Dur : 128 ms      QT Int : 388 ms       P-R-T Axes : 077 -23 002 degrees     QTc Int : 487 ms    Sinus rhythm with Premature atrial complexes  Right bundle branch block  Abnormal  ECG  No previous ECGs available  Confirmed by John Tse MD ([id number]) on 2/26/2020 8:16:33 PM    Referred By: [email or web address]   SELF           Confirmed By:John Tse MD             Lab Results   Component Value Date    WBC 16.08 (H) 02/28/2020    HGB 9.5 (L) 02/28/2020    HCT 29.8 (L) 02/28/2020    MCV 82 02/28/2020     02/28/2020     BMP  Lab Results   Component Value Date     (L) 02/28/2020    K 4.2 02/28/2020     02/28/2020    CO2 26 02/28/2020    BUN 33 (H) 02/28/2020    CREATININE 1.2 02/28/2020    CALCIUM 8.6 (L) 02/28/2020    ANIONGAP 9 02/28/2020    ESTGFRAFRICA >60.0 02/28/2020    EGFRNONAA 55.6 (A) 02/28/2020       Echo 2/26/20  · The estimated PA systolic pressure is 69 mmHg.  · Normal left ventricular systolic function. The estimated ejection fraction is 70%.  · Normal LV diastolic function.  · No wall motion abnormalities.  · Normal right ventricular systolic function.  · Moderate tricuspid regurgitation.  · Moderate right ventricular enlargement.  · Mild left atrial enlargement.  · Moderate right atrial enlargement.  · Mild mitral regurgitation.  · Intermediate central venous pressure (8 mmHg).  · Moderate to significant pulmonary hypertension is present      Anesthesia Evaluation    I have reviewed the Patient Summary Reports.    I have reviewed the Nursing Notes.   I have reviewed the Medications.     Review of Systems  Anesthesia Hx:  No problems with previous Anesthesia  Denies Family Hx of Anesthesia complications.   Denies Personal Hx of Anesthesia complications.   Social:  Former Smoker    Hematology/Oncology:        Hematology Comments: Factor V Leiden mutation, IVC filter   Cardiovascular:   Exercise tolerance: good Hypertension CHF (HFpEF) Reportedly normal stress July 2019 Functional Capacity good / => 4 METS    Pulmonary:   Shortness of breath (on admission, improved s/p diuresis)    Hepatic/GI:   PUD,    Musculoskeletal:  Spine Disorders: lumbar     Neurological:  Neurology Normal    Endocrine:   Diabetes, type 2        Physical Exam  General:  Well nourished    Airway/Jaw/Neck:  Airway Findings: Mouth Opening: Normal Mallampati: III  TM Distance: Normal, at least 6 cm  Jaw/Neck Findings:  Neck ROM: Normal ROM      Dental:  Dental Findings: Edentulous   Chest/Lungs:  Chest/Lungs Findings: Clear to auscultation, Normal Respiratory Rate     Heart/Vascular:  Heart Findings: Rate: Normal  Rhythm: Regular Rhythm  Sounds: Normal        Mental Status:  Mental Status Findings:  Cooperative, Alert and Oriented         Anesthesia Plan  Type of Anesthesia, risks & benefits discussed:  Anesthesia Type:  MAC  Patient's Preference:   Intra-op Monitoring Plan: standard ASA monitors  Intra-op Monitoring Plan Comments:   Post Op Pain Control Plan: per primary service following discharge from PACU  Post Op Pain Control Plan Comments:   Induction:    Beta Blocker:  Patient is on a Beta-Blocker and has received one dose within the past 24 hours (No further documentation required).       Informed Consent: Patient understands risks and agrees with Anesthesia plan.  Questions answered. Anesthesia consent signed with patient.  ASA Score: 3     Day of Surgery Review of History & Physical:    H&P update referred to the surgeon.     Anesthesia Plan Notes: MAC with propofol        Ready For Surgery From Anesthesia Perspective.

## 2020-02-29 NOTE — NURSING
Discharge instructions reviewed with pt and wife,  Medications reviewed and changes noted.  Pt has outpt EGD and colonoscopy on Monday.  I reviewed all instructions and pt and wife voiced understanding.  Pt and wife both educated on how to give a lovenox injection and when to stop the medication.  Pt instructed on clear liquid diet and npo after midnight.  Pt and wife voiced understanding.  Pt discharged with all personal belongings via WC in private vehicle.

## 2020-03-01 LAB
BLD PROD TYP BPU: NORMAL
BLD PROD TYP BPU: NORMAL
BLOOD UNIT EXPIRATION DATE: NORMAL
BLOOD UNIT EXPIRATION DATE: NORMAL
BLOOD UNIT TYPE CODE: 6200
BLOOD UNIT TYPE CODE: 6200
BLOOD UNIT TYPE: NORMAL
BLOOD UNIT TYPE: NORMAL
CODING SYSTEM: NORMAL
CODING SYSTEM: NORMAL
DISPENSE STATUS: NORMAL
DISPENSE STATUS: NORMAL
NUM UNITS TRANS PACKED RBC: NORMAL
NUM UNITS TRANS PACKED RBC: NORMAL

## 2020-03-02 ENCOUNTER — PATIENT OUTREACH (OUTPATIENT)
Dept: ADMINISTRATIVE | Facility: HOSPITAL | Age: 85
End: 2020-03-02

## 2020-03-02 ENCOUNTER — ANESTHESIA (OUTPATIENT)
Dept: SURGERY | Facility: HOSPITAL | Age: 85
End: 2020-03-02
Payer: MEDICARE

## 2020-03-02 ENCOUNTER — HOSPITAL ENCOUNTER (OUTPATIENT)
Facility: HOSPITAL | Age: 85
Discharge: HOME | End: 2020-03-02
Attending: INTERNAL MEDICINE | Admitting: INTERNAL MEDICINE
Payer: MEDICARE

## 2020-03-02 ENCOUNTER — ANESTHESIA EVENT (OUTPATIENT)
Dept: SURGERY | Facility: HOSPITAL | Age: 85
End: 2020-03-02
Payer: MEDICARE

## 2020-03-02 VITALS
RESPIRATION RATE: 18 BRPM | DIASTOLIC BLOOD PRESSURE: 71 MMHG | OXYGEN SATURATION: 97 % | TEMPERATURE: 98 F | HEART RATE: 63 BPM | WEIGHT: 232 LBS | SYSTOLIC BLOOD PRESSURE: 159 MMHG | HEIGHT: 75 IN | BODY MASS INDEX: 28.85 KG/M2

## 2020-03-02 DIAGNOSIS — D64.9 ANEMIA: ICD-10-CM

## 2020-03-02 PROCEDURE — 27201028 HC NEEDLE, SCLERO: Performed by: INTERNAL MEDICINE

## 2020-03-02 PROCEDURE — 27000671 HC TUBING MICROBORE EXT: Performed by: ANESTHESIOLOGY

## 2020-03-02 PROCEDURE — 45381 COLONOSCOPY SUBMUCOUS NJX: CPT | Performed by: INTERNAL MEDICINE

## 2020-03-02 PROCEDURE — 45390 COLONOSCOPY W/RESECTION: CPT | Performed by: INTERNAL MEDICINE

## 2020-03-02 PROCEDURE — 25000003 PHARM REV CODE 250: Performed by: INTERNAL MEDICINE

## 2020-03-02 PROCEDURE — 37000009 HC ANESTHESIA EA ADD 15 MINS: Performed by: INTERNAL MEDICINE

## 2020-03-02 PROCEDURE — 25000003 PHARM REV CODE 250

## 2020-03-02 PROCEDURE — 27200997: Performed by: INTERNAL MEDICINE

## 2020-03-02 PROCEDURE — 37000008 HC ANESTHESIA 1ST 15 MINUTES: Performed by: INTERNAL MEDICINE

## 2020-03-02 PROCEDURE — 88305 TISSUE EXAM BY PATHOLOGIST: CPT | Mod: TC,59

## 2020-03-02 PROCEDURE — 27201089 HC SNARE, DISP (ANY): Performed by: INTERNAL MEDICINE

## 2020-03-02 PROCEDURE — 63600175 PHARM REV CODE 636 W HCPCS: Performed by: NURSE ANESTHETIST, CERTIFIED REGISTERED

## 2020-03-02 PROCEDURE — 43251 EGD REMOVE LESION SNARE: CPT | Performed by: INTERNAL MEDICINE

## 2020-03-02 PROCEDURE — 45385 COLONOSCOPY W/LESION REMOVAL: CPT | Performed by: INTERNAL MEDICINE

## 2020-03-02 PROCEDURE — 27201114 HC TRAP (ANY): Performed by: INTERNAL MEDICINE

## 2020-03-02 PROCEDURE — 27202438 HC MUCOSAL LIFTING AGENT: Performed by: INTERNAL MEDICINE

## 2020-03-02 RX ORDER — PROPOFOL 10 MG/ML
VIAL (ML) INTRAVENOUS
Status: DISCONTINUED | OUTPATIENT
Start: 2020-03-02 | End: 2020-03-02

## 2020-03-02 RX ORDER — SODIUM CHLORIDE 9 MG/ML
INJECTION, SOLUTION INTRAVENOUS CONTINUOUS
Status: DISCONTINUED | OUTPATIENT
Start: 2020-03-02 | End: 2020-03-02 | Stop reason: HOSPADM

## 2020-03-02 RX ORDER — SODIUM CHLORIDE 9 MG/ML
INJECTION, SOLUTION INTRAVENOUS CONTINUOUS PRN
Status: DISCONTINUED | OUTPATIENT
Start: 2020-03-02 | End: 2020-03-02

## 2020-03-02 RX ORDER — SODIUM CHLORIDE 0.9 % (FLUSH) 0.9 %
2 SYRINGE (ML) INJECTION
Status: DISCONTINUED | OUTPATIENT
Start: 2020-03-02 | End: 2020-03-02 | Stop reason: HOSPADM

## 2020-03-02 RX ADMIN — PROPOFOL 20 MG: 10 INJECTION, EMULSION INTRAVENOUS at 09:03

## 2020-03-02 RX ADMIN — SODIUM CHLORIDE: 900 INJECTION, SOLUTION INTRAVENOUS at 09:03

## 2020-03-02 RX ADMIN — SIMETHICONE 30 ML: 20 SUSPENSION/ DROPS ORAL at 09:03

## 2020-03-02 RX ADMIN — PROPOFOL 40 MG: 10 INJECTION, EMULSION INTRAVENOUS at 09:03

## 2020-03-02 NOTE — DISCHARGE INSTRUCTIONS

## 2020-03-02 NOTE — ANESTHESIA POSTPROCEDURE EVALUATION
Anesthesia Post Evaluation    Patient: Ric Guzmán    Procedure(s) Performed: Procedure(s) (LRB):  COLONOSCOPY (N/A)  EGD (ESOPHAGOGASTRODUODENOSCOPY) (N/A)    Final Anesthesia Type: MAC    Patient location during evaluation: GI PACU            Comments: The patient states that he was comfortable for the procedure and is without recall the procedure.          Vitals Value Taken Time   /80 3/2/2020 10:24 AM   Temp 36.8 °C (98.2 °F) 3/2/2020 10:20 AM   Pulse 64 3/2/2020 10:20 AM   Resp 18 3/2/2020 10:20 AM   SpO2 98 % 3/2/2020 10:20 AM         No case tracking events are documented in the log.      Pain/Alysa Score: Alysa Score: 10 (3/2/2020 10:24 AM)

## 2020-03-02 NOTE — ANESTHESIA PREPROCEDURE EVALUATION
03/02/2020  Ric Guzmán is a 84 y.o., male   Patient Active Problem List   Diagnosis    Other spondylosis, lumbar region    Lumbar radiculitis    Chronic SI joint pain    Back pain    Type 2 diabetes mellitus without complication, without long-term current use of insulin    Essential hypertension    Iron deficiency anemia    Anemia       Past Surgical History:   Procedure Laterality Date    CAUDAL EPIDURAL STEROID INJECTION N/A 12/10/2018    Procedure: Injection-steroid-epidural-caudal;  Surgeon: Joe Matos MD;  Location: [facility] OR;  Service: Pain Management;  Laterality: N/A;  caudal CIARA     COLON SURGERY      EYE SURGERY      FRACTURE SURGERY      HERNIA REPAIR      INJECTION OF ANESTHETIC AGENT AROUND LATERAL BRANCH NERVES OF SACROILIAC JOINT Bilateral 1/17/2019    Procedure: BLOCK, NERVE, SACROILIAC JOINT, LATERAL BRANCH;  Surgeon: Joe Matos MD;  Location: [facility] OR;  Service: Pain Management;  Laterality: Bilateral;  bilateral SI joint injection    JOINT REPLACEMENT      RADIOFREQUENCY ABLATION Left 3/1/2019    Procedure: Radiofrequency Ablation;  Surgeon: Joe Matos MD;  Location: [facility] OR;  Service: Pain Management;  Laterality: Left;  L5, S1, S2    SPINE SURGERY      TONSILLECTOMY      VASECTOMY          Tobacco Use:  The patient  reports that he has quit smoking. He has quit using smokeless tobacco.     Results for orders placed or performed during the hospital encounter of 02/26/20   EKG 12-lead    Collection Time: 02/26/20  8:33 AM    Narrative    Test Reason : R07.9,    Vent. Rate : 095 BPM     Atrial Rate : 095 BPM     P-R Int : 164 ms          QRS Dur : 128 ms      QT Int : 388 ms       P-R-T Axes : 077 -23 002 degrees     QTc Int : 487 ms    Sinus rhythm with Premature atrial complexes  Right bundle branch block  Abnormal ECG  No previous ECGs available  Confirmed by  John Tse MD ([id number]) on 2/26/2020 8:16:33 PM    Referred By: [email or web address]   SELF           Confirmed By:John Tse MD             Lab Results   Component Value Date    WBC 16.08 (H) 02/28/2020    HGB 9.5 (L) 02/28/2020    HCT 29.8 (L) 02/28/2020    MCV 82 02/28/2020     02/28/2020     BMP  Lab Results   Component Value Date     (L) 02/28/2020    K 4.2 02/28/2020     02/28/2020    CO2 26 02/28/2020    BUN 33 (H) 02/28/2020    CREATININE 1.2 02/28/2020    CALCIUM 8.6 (L) 02/28/2020    ANIONGAP 9 02/28/2020    ESTGFRAFRICA >60.0 02/28/2020    EGFRNONAA 55.6 (A) 02/28/2020       Echo 2/26/20  · The estimated PA systolic pressure is 69 mmHg.  · Normal left ventricular systolic function. The estimated ejection fraction is 70%.  · Normal LV diastolic function.  · No wall motion abnormalities.  · Normal right ventricular systolic function.  · Moderate tricuspid regurgitation.  · Moderate right ventricular enlargement.  · Mild left atrial enlargement.  · Moderate right atrial enlargement.  · Mild mitral regurgitation.  · Intermediate central venous pressure (8 mmHg).  · Moderate to significant pulmonary hypertension is present      Pre-op Assessment    I have reviewed the Patient Summary Reports.     I have reviewed the Nursing Notes.   I have reviewed the Medications.     Review of Systems  Anesthesia Hx:  No problems with previous Anesthesia  Denies Family Hx of Anesthesia complications.   Denies Personal Hx of Anesthesia complications.   Social:  Former Smoker    Hematology/Oncology:         -- Anemia: Hematology Comments: Factor V Leiden mutation, IVC filter   Cardiovascular:   Exercise tolerance: good Hypertension CHF (HFpEF) Reportedly normal stress July 2019 Functional Capacity good / => 4 METS    Pulmonary:   Shortness of breath (on admission, improved s/p diuresis)    Hepatic/GI:   PUD, No Active N/V  No Intestinal Obstruction   Musculoskeletal:   Arthritis   Spine Disorders: lumbar     Neurological:  Neurology Normal    Endocrine:   Diabetes, type 2        Physical Exam  General:  Well nourished    Airway/Jaw/Neck:  Airway Findings: Mouth Opening: Normal Mallampati: III  TM Distance: Normal, at least 6 cm  Jaw/Neck Findings:  Neck ROM: Normal ROM      Dental:  Dental Findings: Edentulous   Chest/Lungs:  Chest/Lungs Findings: Clear to auscultation, Normal Respiratory Rate     Heart/Vascular:  Heart Findings: Rate: Normal  Rhythm: Regular Rhythm  Sounds: Normal        Mental Status:  Mental Status Findings:  Cooperative, Alert and Oriented         Anesthesia Plan  Type of Anesthesia, risks & benefits discussed:  Anesthesia Type:  MAC  Patient's Preference:   Intra-op Monitoring Plan: standard ASA monitors  Intra-op Monitoring Plan Comments:   Post Op Pain Control Plan: per primary service following discharge from PACU  Post Op Pain Control Plan Comments:   Induction:    Beta Blocker:  Patient is on a Beta-Blocker and has received one dose within the past 24 hours (No further documentation required).       Informed Consent: Patient understands risks and agrees with Anesthesia plan.  Questions answered. Anesthesia consent signed with patient.  ASA Score: 3     Day of Surgery Review of History & Physical:    H&P update referred to the surgeon.     Anesthesia Plan Notes: MAC with propofol        Ready For Surgery From Anesthesia Perspective.

## 2020-03-02 NOTE — LETTER
March 2, 2020    Ric Guzmán  [address] [place] [address]             [facility] [address] [facility] [address]  Phone: [phone] Dear Jay [facility] is committed to your overall health and would like to ensure that you are up to date on your recommended test and/or procedures.   Dave Enriquez MD  has found that your chart shows you may be due for the following:    Health Maintenance Due   Topic Date Due    Foot Exam  08/26/1945    TETANUS VACCINE  08/26/1954    Shingles Vaccine (1 of 2) 08/26/1986    Pneumococcal Vaccine (65+ Low/Medium Risk) (1 of 2 - PCV13) 08/26/2001    Urine Microalbumin  01/26/2018    Influenza Vaccine (1) 09/01/2019     If you have had any of the above done at another facility, please let us know so that we may obtain copies from that facility.  If you have a copy of these records, please provide a copy for us to scan into your chart.  You are welcome to request that the report be faxed to us at  ([phone]).     Otherwise, please schedule these appointments at your earliest convenience by calling [phone] or going to [email or web address].    If you have an upcoming scheduled appointment for the item above, please disregard this letter.    Sincerely,  Your [facility]  MD Katrina Romero L.P.N. Clinical Care Coordinator  [address] [place] [address], [place] [address] [phone]

## 2020-03-02 NOTE — TRANSFER OF CARE
"Anesthesia Transfer of Care Note    Patient: Ric Guzmán    Procedure(s) Performed: Procedure(s) (LRB):  COLONOSCOPY (N/A)  EGD (ESOPHAGOGASTRODUODENOSCOPY) (N/A)    Patient location: GI    Anesthesia Type: MAC    Post pain: adequate analgesia    Post assessment: no apparent anesthetic complications    Post vital signs: stable    Level of consciousness: awake and alert    Nausea/Vomiting: no nausea/vomiting    Complications: none    Transfer of care protocol was followed      Last vitals:   Visit Vitals  BP (!) 176/72   Pulse 70   Temp 36.9 °C (98.5 °F) (Oral)   Resp 15   Ht 6' 3" (1.905 m)   Wt 105.2 kg (232 lb)   SpO2 100%   BMI 29.00 kg/m²     "

## 2020-03-02 NOTE — ANESTHESIA POSTPROCEDURE EVALUATION
Anesthesia Post Evaluation    Patient: Ric Guzmán    Procedure(s) Performed: Procedure(s) (LRB):  COLONOSCOPY (N/A)  EGD (ESOPHAGOGASTRODUODENOSCOPY) (N/A)    Final Anesthesia Type: MAC    Patient location during evaluation: GI PACU                    Vitals Value Taken Time   /80 3/2/2020 10:24 AM   Temp 36.8 °C (98.2 °F) 3/2/2020 10:20 AM   Pulse 64 3/2/2020 10:20 AM   Resp 18 3/2/2020 10:20 AM   SpO2 98 % 3/2/2020 10:20 AM         No case tracking events are documented in the log.      Pain/Alysa Score: Alysa Score: 10 (3/2/2020 10:24 AM)

## 2020-03-02 NOTE — [employer] PATIENT INSTRUCTIONS
Discharge Summary/Instructions after an Endoscopic Procedure  Patient Name: Ric Ramos  Patient MRN: [id number]  Patient YOB: 1935 Monday, March 02, 2020  Justin Ward MD  RESTRICTIONS:  During your procedure today, you received medications for sedation.  These   medications may affect your judgment, balance and coordination.  Therefore,   for 24 hours, you have the following restrictions:   - DO NOT drive a car, operate machinery, make legal/financial decisions,   sign important papers or drink alcohol.    ACTIVITY:  Today: no heavy lifting, straining or running due to procedural   sedation/anesthesia.  The following day: return to full activity including work.  DIET:  Eat and drink normally unless instructed otherwise.     TREATMENT FOR COMMON SIDE EFFECTS:  - Mild abdominal pain, nausea, belching, bloating or excessive gas:  rest,   eat lightly and use a heating pad.  - Sore Throat: treat with throat lozenges and/or gargle with warm salt   water.  - Because air was used during the procedure, expelling large amounts of air   from your rectum or belching is normal.  - If a bowel prep was taken, you may not have a bowel movement for 1-3 days.    This is normal.  SYMPTOMS TO WATCH FOR AND REPORT TO YOUR PHYSICIAN:  1. Abdominal pain or bloating, other than gas cramps.  2. Chest pain.  3. Back pain.  4. Signs of infection such as: chills or fever occurring within 24 hours   after the procedure.  5. Rectal bleeding, which would show as bright red, maroon, or black stools.   (A tablespoon of blood from the rectum is not serious, especially if   hemorrhoids are present.)  6. Vomiting.  7. Weakness or dizziness.  GO DIRECTLY TO THE NEAREST EMERGENCY ROOM IF YOU HAVE ANY OF THE FOLLOWING:      Difficulty breathing              Chills and/or fever over 101 F   Persistent vomiting and/or vomiting blood   Severe abdominal pain   Severe chest pain   Black, tarry stools   Bleeding- more than one tablespoon   Any  other symptom or condition that you feel may need urgent attention  Your doctor recommends these additional instructions:  If any biopsies were taken, your doctors clinic will contact you in 1 to 2   weeks with any results.  - Patient has a contact number available for emergencies.  The signs and   symptoms of potential delayed complications were discussed with the   patient.  Return to normal activities tomorrow.  Written discharge   instructions were provided to the patient.   - Resume previous diet.   - Continue present medications.   - Await pathology results.   - Repeat upper endoscopy for surveillance based on pathology results.   - Return to GI clinic in 2 weeks.   - Resume blood thinner in 48 hours  - Discharge patient to home (with escort).  For questions, problems or results please call your physician - Justin Ward MD at Work:  [phone].  [facility], EMERGENCY ROOM PHONE NUMBER: [phone]  IF A COMPLICATION OR EMERGENCY SITUATION ARISES AND YOU ARE UNABLE TO REACH   YOUR PHYSICIAN - GO DIRECTLY TO THE EMERGENCY ROOM.  MD Justin Sampson MD  3/2/2020 10:02:23 AM  This report has been verified and signed electronically.  [employer]

## 2020-03-02 NOTE — PROVATION PATIENT INSTRUCTIONS
Discharge Summary/Instructions after an Endoscopic Procedure  Patient Name: Ric Ramos  Patient MRN: [id number]  Patient YOB: 1935 Monday, March 02, 2020  Justin Ward MD  RESTRICTIONS:  During your procedure today, you received medications for sedation.  These   medications may affect your judgment, balance and coordination.  Therefore,   for 24 hours, you have the following restrictions:   - DO NOT drive a car, operate machinery, make legal/financial decisions,   sign important papers or drink alcohol.    ACTIVITY:  Today: no heavy lifting, straining or running due to procedural   sedation/anesthesia.  The following day: return to full activity including work.  DIET:  Eat and drink normally unless instructed otherwise.     TREATMENT FOR COMMON SIDE EFFECTS:  - Mild abdominal pain, nausea, belching, bloating or excessive gas:  rest,   eat lightly and use a heating pad.  - Sore Throat: treat with throat lozenges and/or gargle with warm salt   water.  - Because air was used during the procedure, expelling large amounts of air   from your rectum or belching is normal.  - If a bowel prep was taken, you may not have a bowel movement for 1-3 days.    This is normal.  SYMPTOMS TO WATCH FOR AND REPORT TO YOUR PHYSICIAN:  1. Abdominal pain or bloating, other than gas cramps.  2. Chest pain.  3. Back pain.  4. Signs of infection such as: chills or fever occurring within 24 hours   after the procedure.  5. Rectal bleeding, which would show as bright red, maroon, or black stools.   (A tablespoon of blood from the rectum is not serious, especially if   hemorrhoids are present.)  6. Vomiting.  7. Weakness or dizziness.  GO DIRECTLY TO THE NEAREST EMERGENCY ROOM IF YOU HAVE ANY OF THE FOLLOWING:      Difficulty breathing              Chills and/or fever over 101 F   Persistent vomiting and/or vomiting blood   Severe abdominal pain   Severe chest pain   Black, tarry stools   Bleeding- more than one tablespoon   Any  other symptom or condition that you feel may need urgent attention  Your doctor recommends these additional instructions:  If any biopsies were taken, your doctors clinic will contact you in 1 to 2   weeks with any results.  - Patient has a contact number available for emergencies.  The signs and   symptoms of potential delayed complications were discussed with the   patient.  Return to normal activities tomorrow.  Written discharge   instructions were provided to the patient.   - Resume previous diet.   - Continue present medications.   - Await pathology results.   - Repeat colonoscopy in 2 months for retreatment.   - Return to GI clinic in 2 weeks.   - Resume blood thinners in 48 hours given extensive polypectomies, emr...  - Discharge patient to home (with escort).  For questions, problems or results please call your physician - Justin Ward MD at Work:  [phone].  [facility], EMERGENCY ROOM PHONE NUMBER: [phone]  IF A COMPLICATION OR EMERGENCY SITUATION ARISES AND YOU ARE UNABLE TO REACH   YOUR PHYSICIAN - GO DIRECTLY TO THE EMERGENCY ROOM.  MD Justin Sampson MD  3/2/2020 10:11:35 AM  This report has been verified and signed electronically.  [employer]

## 2020-03-16 ENCOUNTER — LAB VISIT (OUTPATIENT)
Dept: LAB | Facility: HOSPITAL | Age: 85
End: 2020-03-16
Attending: FAMILY MEDICINE
Payer: MEDICARE

## 2020-03-16 ENCOUNTER — OFFICE VISIT (OUTPATIENT)
Dept: FAMILY MEDICINE | Facility: CLINIC | Age: 85
End: 2020-03-16
Payer: MEDICARE

## 2020-03-16 VITALS
HEIGHT: 75 IN | SYSTOLIC BLOOD PRESSURE: 164 MMHG | HEART RATE: 82 BPM | RESPIRATION RATE: 16 BRPM | OXYGEN SATURATION: 97 % | WEIGHT: 233.38 LBS | BODY MASS INDEX: 29.02 KG/M2 | DIASTOLIC BLOOD PRESSURE: 77 MMHG

## 2020-03-16 DIAGNOSIS — E11.9 DIABETES MELLITUS WITHOUT COMPLICATION: ICD-10-CM

## 2020-03-16 DIAGNOSIS — I10 ESSENTIAL HYPERTENSION: ICD-10-CM

## 2020-03-16 DIAGNOSIS — E11.9 DIABETES MELLITUS WITHOUT COMPLICATION: Primary | ICD-10-CM

## 2020-03-16 DIAGNOSIS — D50.8 OTHER IRON DEFICIENCY ANEMIA: ICD-10-CM

## 2020-03-16 LAB
ALBUMIN SERPL BCP-MCNC: 3.3 G/DL (ref 3.5–5.2)
ALP SERPL-CCNC: 69 U/L (ref 55–135)
ALT SERPL W/O P-5'-P-CCNC: 20 U/L (ref 10–44)
ANION GAP SERPL CALC-SCNC: 9 MMOL/L (ref 8–16)
AST SERPL-CCNC: 24 U/L (ref 10–40)
BASOPHILS # BLD AUTO: 0.06 K/UL (ref 0–0.2)
BASOPHILS NFR BLD: 1 % (ref 0–1.9)
BILIRUB SERPL-MCNC: 0.7 MG/DL (ref 0.1–1)
BUN SERPL-MCNC: 21 MG/DL (ref 8–23)
CALCIUM SERPL-MCNC: 8.6 MG/DL (ref 8.7–10.5)
CHLORIDE SERPL-SCNC: 104 MMOL/L (ref 95–110)
CO2 SERPL-SCNC: 25 MMOL/L (ref 23–29)
CREAT SERPL-MCNC: 1.1 MG/DL (ref 0.5–1.4)
DIFFERENTIAL METHOD: ABNORMAL
EOSINOPHIL # BLD AUTO: 0.2 K/UL (ref 0–0.5)
EOSINOPHIL NFR BLD: 3.5 % (ref 0–8)
ERYTHROCYTE [DISTWIDTH] IN BLOOD BY AUTOMATED COUNT: 18.6 % (ref 11.5–14.5)
EST. GFR  (AFRICAN AMERICAN): >60 ML/MIN/1.73 M^2
EST. GFR  (NON AFRICAN AMERICAN): >60 ML/MIN/1.73 M^2
FERRITIN SERPL-MCNC: 89 NG/ML (ref 20–300)
GLUCOSE SERPL-MCNC: 127 MG/DL (ref 70–110)
HCT VFR BLD AUTO: 34.4 % (ref 40–54)
HGB BLD-MCNC: 10.6 G/DL (ref 14–18)
IMM GRANULOCYTES # BLD AUTO: 0.06 K/UL (ref 0–0.04)
IMM GRANULOCYTES NFR BLD AUTO: 1 % (ref 0–0.5)
IRON SERPL-MCNC: 90 UG/DL (ref 45–160)
LYMPHOCYTES # BLD AUTO: 1 K/UL (ref 1–4.8)
LYMPHOCYTES NFR BLD: 15.5 % (ref 18–48)
MCH RBC QN AUTO: 26.8 PG (ref 27–31)
MCHC RBC AUTO-ENTMCNC: 30.8 G/DL (ref 32–36)
MCV RBC AUTO: 87 FL (ref 82–98)
MONOCYTES # BLD AUTO: 0.9 K/UL (ref 0.3–1)
MONOCYTES NFR BLD: 14.7 % (ref 4–15)
NEUTROPHILS # BLD AUTO: 4 K/UL (ref 1.8–7.7)
NEUTROPHILS NFR BLD: 64.3 % (ref 38–73)
NRBC BLD-RTO: 0 /100 WBC
PLATELET # BLD AUTO: 264 K/UL (ref 150–350)
PMV BLD AUTO: 12.3 FL (ref 9.2–12.9)
POTASSIUM SERPL-SCNC: 4.2 MMOL/L (ref 3.5–5.1)
PROT SERPL-MCNC: 7.8 G/DL (ref 6–8.4)
RBC # BLD AUTO: 3.96 M/UL (ref 4.6–6.2)
SATURATED IRON: 28 % (ref 20–50)
SODIUM SERPL-SCNC: 138 MMOL/L (ref 136–145)
TOTAL IRON BINDING CAPACITY: 326 UG/DL (ref 250–450)
TRANSFERRIN SERPL-MCNC: 220 MG/DL (ref 200–375)
WBC # BLD AUTO: 6.25 K/UL (ref 3.9–12.7)

## 2020-03-16 PROCEDURE — 99214 PR OFFICE/OUTPT VISIT, EST, LEVL IV, 30-39 MIN: ICD-10-PCS | Mod: S$PBB,,, | Performed by: FAMILY MEDICINE

## 2020-03-16 PROCEDURE — 99999 PR PBB SHADOW E&M-EST. PATIENT-LVL III: CPT | Mod: PBBFAC,,, | Performed by: FAMILY MEDICINE

## 2020-03-16 PROCEDURE — 85025 COMPLETE CBC W/AUTO DIFF WBC: CPT

## 2020-03-16 PROCEDURE — 82728 ASSAY OF FERRITIN: CPT

## 2020-03-16 PROCEDURE — 99214 OFFICE O/P EST MOD 30 MIN: CPT | Mod: S$PBB,,, | Performed by: FAMILY MEDICINE

## 2020-03-16 PROCEDURE — 99213 OFFICE O/P EST LOW 20 MIN: CPT | Mod: PBBFAC,PN | Performed by: FAMILY MEDICINE

## 2020-03-16 PROCEDURE — 99999 PR PBB SHADOW E&M-EST. PATIENT-LVL III: ICD-10-PCS | Mod: PBBFAC,,, | Performed by: FAMILY MEDICINE

## 2020-03-16 PROCEDURE — 80053 COMPREHEN METABOLIC PANEL: CPT

## 2020-03-16 PROCEDURE — 83540 ASSAY OF IRON: CPT

## 2020-03-16 PROCEDURE — 36415 COLL VENOUS BLD VENIPUNCTURE: CPT

## 2020-03-16 RX ORDER — FERROUS SULFATE 325(65) MG
1 TABLET ORAL 2 TIMES DAILY
COMMUNITY
Start: 2020-03-11

## 2020-03-16 RX ORDER — LISINOPRIL 2.5 MG/1
TABLET ORAL
COMMUNITY
Start: 2020-03-05

## 2020-03-16 NOTE — PROGRESS NOTES
Subjective:       Patient ID: Ric Guzmán is a 84 y.o. male.    Chief Complaint: Hospital Follow Up (lab review)    In regard to the patient's diabetes, patient reports that blood sugars have been well controlled. Patient Denies hypoglycemia. Patient is not currently on insulin therapy. Patient is on ACE/ARB and is on statin. Last a1c was Hemoglobin A1C       Date                     Value               Ref Range           Status                02/26/2020               7.2 (H)             4.5 - 6.2 %         Final              Comment:    According to ADA guidelines, hemoglobin A1C <7.0% represents  optimal control in non-pregnant diabetic patients.  Different  metrics may apply to specific populations.   Standards of Medical Care in Diabetes - 2016.  For the purpose of screening for the presence of diabetes:  <5.7%     Consistent with the absence of diabetes  5.7-6.4%  Consistent with increasing risk for diabetes   (prediabetes)  >or=6.5%  Consistent with diabetes  Currently no consensus exists for use of hemoglobin A1C  for diagnosis of diabetes for children.         01/15/2019               6.9 (H)             4.5 - 6.2 %         Final              Comment:    According to ADA guidelines, hemoglobin A1C <7.0% represents  optimal control in non-pregnant diabetic patients.  Different  metrics may apply to specific populations.   Standards of Medical Care in Diabetes - 2016.  For the purpose of screening for the presence of diabetes:  <5.7%     Consistent with the absence of diabetes  5.7-6.4%  Consistent with increasing risk for diabetes   (prediabetes)  >or=6.5%  Consistent with diabetes  Currently no consensus exists for use of hemoglobin A1C  for diagnosis of diabetes for children.    ----------    In regards to the patient's hypertension, patient denies chest pain/sob, and reports compliance with medication regimen.    Review of Systems   Constitutional: Negative for activity change, appetite change,  "fatigue and fever.   HENT: Negative for congestion, dental problem, ear discharge, hearing loss, postnasal drip, sinus pain, sneezing and trouble swallowing.    Eyes: Negative for photophobia and discharge.   Respiratory: Negative for apnea, cough and shortness of breath.    Cardiovascular: Negative for chest pain.   Gastrointestinal: Negative for abdominal distention, abdominal pain, blood in stool, diarrhea, nausea and vomiting.   Endocrine: Negative for cold intolerance.   Genitourinary: Negative for difficulty urinating, flank pain, frequency, hematuria and testicular pain.   Musculoskeletal: Positive for arthralgias, back pain, gait problem and myalgias.   Skin: Negative for color change.   Allergic/Immunologic: Negative for environmental allergies, food allergies and immunocompromised state.   Neurological: Negative for dizziness, syncope, numbness and headaches.   Hematological: Negative for adenopathy. Does not bruise/bleed easily.   Psychiatric/Behavioral: Negative for agitation, confusion, decreased concentration, hallucinations, self-injury and sleep disturbance.   All other systems reviewed and are negative.        Reviewed family, medical, surgical, and social history.    Objective:      BP (!) 164/77 (BP Location: Left arm, Patient Position: Sitting, BP Method: Medium (Automatic))   Pulse 82   Resp 16   Ht 6' 3" (1.905 m)   Wt 105.9 kg (233 lb 6.4 oz)   SpO2 97%   BMI 29.17 kg/m²   Physical Exam   Constitutional: He is oriented to person, place, and time. He appears well-developed and well-nourished. No distress.   HENT:   Head: Normocephalic and atraumatic.   Nose: Nose normal.   Mouth/Throat: Oropharynx is clear and moist. No oropharyngeal exudate.   Eyes: Pupils are equal, round, and reactive to light. Conjunctivae and EOM are normal.   Neck: Normal range of motion. No thyromegaly present.   Cardiovascular: Normal rate, regular rhythm, normal heart sounds and intact distal pulses. Exam reveals " no gallop and no friction rub.   No murmur heard.  Pulmonary/Chest: Effort normal and breath sounds normal. No respiratory distress. He has no wheezes. He has no rales.   Abdominal: Soft. He exhibits no distension. There is no tenderness. There is no guarding.   Musculoskeletal: Normal range of motion. He exhibits tenderness and deformity. He exhibits no edema.   Neurological: He is alert and oriented to person, place, and time. He displays normal reflexes. No cranial nerve deficit or sensory deficit. He exhibits normal muscle tone. Coordination normal.   Skin: Skin is warm and dry. No rash noted. He is not diaphoretic. No erythema. No pallor.   Psychiatric: He has a normal mood and affect. His behavior is normal. Judgment and thought content normal.   Nursing note and vitals reviewed.      Assessment:       1. Diabetes mellitus without complication    2. Essential hypertension    3. Other iron deficiency anemia        Plan:       Diabetes mellitus without complication  -     CBC auto differential; Future; Expected date: 03/16/2020  -     Comprehensive metabolic panel; Future; Expected date: 03/16/2020    Essential hypertension  -     CBC auto differential; Future; Expected date: 03/16/2020  -     Comprehensive metabolic panel; Future; Expected date: 03/16/2020    Other iron deficiency anemia  -     Iron and TIBC; Future; Expected date: 03/16/2020  -     Ferritin; Future; Expected date: 03/16/2020    Continue current medicines            Risks, benefits, and side effects were discussed with the patient. All questions were answered to the fullest satisfaction of the patient, and pt verbalized understanding and agreement to treatment plan. Pt was to call with any new or worsening symptoms, or present to the ER.

## 2020-03-27 ENCOUNTER — HOSPITAL ENCOUNTER (EMERGENCY)
Facility: HOSPITAL | Age: 85
Discharge: HOME | End: 2020-03-27
Attending: EMERGENCY MEDICINE
Payer: MEDICARE

## 2020-03-27 ENCOUNTER — NURSE TRIAGE (OUTPATIENT)
Dept: ADMINISTRATIVE | Facility: CLINIC | Age: 85
End: 2020-03-27

## 2020-03-27 VITALS
HEIGHT: 75 IN | TEMPERATURE: 99 F | RESPIRATION RATE: 20 BRPM | SYSTOLIC BLOOD PRESSURE: 172 MMHG | HEART RATE: 84 BPM | DIASTOLIC BLOOD PRESSURE: 84 MMHG | WEIGHT: 230 LBS | OXYGEN SATURATION: 98 % | BODY MASS INDEX: 28.6 KG/M2

## 2020-03-27 DIAGNOSIS — R51.9 SINUS HEADACHE: Primary | ICD-10-CM

## 2020-03-27 PROCEDURE — 99284 EMERGENCY DEPT VISIT MOD MDM: CPT | Mod: 25

## 2020-03-27 PROCEDURE — 63600175 PHARM REV CODE 636 W HCPCS: Performed by: EMERGENCY MEDICINE

## 2020-03-27 PROCEDURE — 96372 THER/PROPH/DIAG INJ SC/IM: CPT

## 2020-03-27 RX ORDER — KETOROLAC TROMETHAMINE 30 MG/ML
30 INJECTION, SOLUTION INTRAMUSCULAR; INTRAVENOUS
Status: COMPLETED | OUTPATIENT
Start: 2020-03-27 | End: 2020-03-27

## 2020-03-27 RX ADMIN — KETOROLAC TROMETHAMINE 30 MG: 30 INJECTION, SOLUTION INTRAMUSCULAR at 08:03

## 2020-03-27 NOTE — ED PROVIDER NOTES
"Encounter Date: 3/27/2020       History     Chief Complaint   Patient presents with    Headache     84-year-old male with past medical history significant for CHF, diabetes mellitus type 2, hypertension, osteoarthritis presents to the ED via EMS for evaluation intermittent, aching, non-radiating, generalized headache over last 2 days.  He has not taken any abortive medication prior to arrival.  Denies blurred vision, confusion, syncope, gait abnormality, speech difficulty, facial asymmetry, incontinence, numbness, weakness, tingling.  Denies chest pain, dyspnea, diaphoresis, palpitations.    Upon arrival patient states he needs a "break from his wife."        Review of patient's allergies indicates:   Allergen Reactions    Cymbalta [duloxetine]      Passes out    Nsaids (non-steroidal anti-inflammatory drug)      Other reaction(s): gi distress  Previous ulcer     Past Medical History:   Diagnosis Date    Allergy     Anticoagulant long-term use     Arthritis     Cancer     Cataract     CHF (congestive heart failure)     Clotting disorder     Diabetes mellitus, type 2     Encounter for blood transfusion     Hypertension     Iron deficiency anemia 2/26/2020    Lumbar radiculitis 12/10/2018    Ulcer      Past Surgical History:   Procedure Laterality Date    CAUDAL EPIDURAL STEROID INJECTION N/A 12/10/2018    Procedure: Injection-steroid-epidural-caudal;  Surgeon: Joe Matos MD;  Location: [facility] OR;  Service: Pain Management;  Laterality: N/A;  caudal CIARA     COLON SURGERY      COLONOSCOPY N/A 3/2/2020    Procedure: COLONOSCOPY;  Surgeon: Justin Ward MD;  Location: [facility];  Service: Endoscopy;  Laterality: N/A;    ESOPHAGOGASTRODUODENOSCOPY N/A 3/2/2020    Procedure: EGD (ESOPHAGOGASTRODUODENOSCOPY);  Surgeon: Justin Ward MD;  Location: [facility];  Service: Endoscopy;  Laterality: N/A;    EYE SURGERY      FRACTURE SURGERY      HERNIA REPAIR      INJECTION OF ANESTHETIC AGENT AROUND " LATERAL BRANCH NERVES OF SACROILIAC JOINT Bilateral 1/17/2019    Procedure: BLOCK, NERVE, SACROILIAC JOINT, LATERAL BRANCH;  Surgeon: Joe Matos MD;  Location: [facility] OR;  Service: Pain Management;  Laterality: Bilateral;  bilateral SI joint injection    JOINT REPLACEMENT      RADIOFREQUENCY ABLATION Left 3/1/2019    Procedure: Radiofrequency Ablation;  Surgeon: Joe Matos MD;  Location: [facility] OR;  Service: Pain Management;  Laterality: Left;  L5, S1, S2    SMALL BOWEL ENTEROSCOPY Left 2/28/2020    Procedure: ENTEROSCOPY;  Surgeon: Justin Ward MD;  Location: [facility] ENDO;  Service: Endoscopy;  Laterality: Left;    SPINE SURGERY      TONSILLECTOMY      VASECTOMY       No family history on file.  Social History     Tobacco Use    Smoking status: Former Smoker    Smokeless tobacco: Former User   Substance Use Topics    Alcohol use: Not Currently     Alcohol/week: 4.0 standard drinks     Types: 4 Cans of beer per week    Drug use: No     Review of Systems   Constitutional: Negative for appetite change, chills, diaphoresis, fatigue and fever.   HENT: Negative for congestion, ear pain, rhinorrhea, sinus pressure, sinus pain, sore throat and tinnitus.    Eyes: Negative for photophobia and visual disturbance.   Respiratory: Negative for cough, chest tightness, shortness of breath and wheezing.    Cardiovascular: Negative for chest pain, palpitations and leg swelling.   Gastrointestinal: Negative for abdominal pain, constipation, diarrhea, nausea and vomiting.   Endocrine: Negative for cold intolerance, heat intolerance, polydipsia, polyphagia and polyuria.   Genitourinary: Negative for decreased urine volume, difficulty urinating, dysuria, flank pain, frequency, hematuria and urgency.   Musculoskeletal: Negative for arthralgias, back pain, gait problem, joint swelling, myalgias, neck pain and neck stiffness.   Skin: Negative for color change, pallor, rash and wound.   Allergic/Immunologic: Negative for  immunocompromised state.   Neurological: Positive for headaches. Negative for dizziness, syncope, weakness, light-headedness and numbness.   Hematological: Negative for adenopathy. Does not bruise/bleed easily.   Psychiatric/Behavioral: Negative for decreased concentration, dysphoric mood and sleep disturbance. The patient is not nervous/anxious.    All other systems reviewed and are negative.      Physical Exam     Initial Vitals [03/27/20 0851]   BP Pulse Resp Temp SpO2   (!) 172/84 84 20 98.5 °F (36.9 °C) 98 %      MAP       --         Physical Exam    Nursing note and vitals reviewed.  Constitutional: He appears well-developed and well-nourished. He is not diaphoretic. No distress.   HENT:   Head: Normocephalic and atraumatic.   Right Ear: External ear normal.   Left Ear: External ear normal.   Nose: Nose normal.   Mouth/Throat: Oropharynx is clear and moist.   Eyes: Conjunctivae are normal. Pupils are equal, round, and reactive to light. No scleral icterus.   Neck: Normal range of motion. Neck supple. No JVD present.   Cardiovascular: Normal rate, regular rhythm, normal heart sounds and intact distal pulses.   Pulmonary/Chest: Breath sounds normal. No respiratory distress. He has no wheezes. He has no rhonchi. He has no rales. He exhibits no tenderness.   Abdominal: Soft. Bowel sounds are normal. He exhibits no distension. There is no tenderness. There is no rebound and no guarding.   Musculoskeletal: Normal range of motion. He exhibits no edema or tenderness.   Lymphadenopathy:     He has no cervical adenopathy.   Neurological: He is alert and oriented to person, place, and time. He has normal strength and normal reflexes. He displays normal reflexes. No cranial nerve deficit or sensory deficit. GCS score is 15. GCS eye subscore is 4. GCS verbal subscore is 5. GCS motor subscore is 6.   Skin: Skin is warm and dry. Capillary refill takes less than 2 seconds. No rash and no abscess noted. No erythema. No pallor.    Psychiatric: He has a normal mood and affect. His behavior is normal. Judgment and thought content normal.         ED Course   Procedures  Labs Reviewed - No data to display       Imaging Results    None          Medical Decision Making:   Differential Diagnosis:   Hypertensive headache, migraine headache, tension headache, sinus headache, cluster headache  ED Management:  Neurologically intact on arrival with GCS 15.  No focal deficits.  Medicated with Toradol with improvement in symptoms.                                 Clinical Impression:       ICD-10-CM ICD-9-CM   1. Sinus headache R51 784.0         Disposition:   Disposition: Discharged  Condition: Stable     ED Disposition Condition    Discharge Stable        ED Prescriptions     None        Follow-up Information     Follow up With Specialties Details Why Contact Info    Dave Enriquez MD Family Medicine Schedule an appointment as soon as possible for a visit in 3 days  [address]  [place] [address]  [phone]                                       Isha Alfredo MD  03/27/20 1016

## 2020-03-27 NOTE — TELEPHONE ENCOUNTER
Pt was called x 2 and voicemail was left x 2.     Reason for Disposition   Message left on identified voice mail    Additional Information   Negative: Caller is angry or rude (e.g., hangs up, verbally abusive, yelling)   Negative: Caller hangs up   Negative: Caller has already spoken with the PCP and has no further questions.   Negative: Caller has already spoken with another triager and has no further questions.   Negative: Caller has already spoken with another triager or PCP AND has further questions AND triager able to answer questions.   Negative: Busy signal.  First attempt to contact caller.  Follow-up call scheduled within 15 minutes.   Negative: No answer.  First attempt to contact caller.  Follow-up call scheduled within 15 minutes.    Protocols used: NO CONTACT OR DUPLICATE CONTACT CALL-A-

## 2020-05-05 ENCOUNTER — PATIENT MESSAGE (OUTPATIENT)
Dept: ADMINISTRATIVE | Facility: HOSPITAL | Age: 85
End: 2020-05-05

## 2020-05-20 ENCOUNTER — PATIENT MESSAGE (OUTPATIENT)
Dept: ADMINISTRATIVE | Facility: HOSPITAL | Age: 85
End: 2020-05-20

## 2020-10-01 ENCOUNTER — PATIENT MESSAGE (OUTPATIENT)
Dept: OTHER | Facility: OTHER | Age: 85
End: 2020-10-01

## 2020-11-24 NOTE — INTERVAL H&P NOTE
The patient has been examined and the H&P has been reviewed:    I concur with the findings and no changes have occurred since H&P was written.   This patient has been cleared for surgery in an ambulatory surgical facility    ASA 3,  Mallampatti Score 3  No history of anesthetic complications  Plan for RN IV sedation      Anesthesia/Surgery risks, benefits and alternative options discussed and understood by patient/family.          Active Hospital Problems    Diagnosis  POA    Chronic SI joint pain [M53.3, G89.29]  Yes      Resolved Hospital Problems   No resolved problems to display.     
not examined

## 2020-12-11 ENCOUNTER — PATIENT MESSAGE (OUTPATIENT)
Dept: OTHER | Facility: OTHER | Age: 85
End: 2020-12-11

## 2021-03-17 DIAGNOSIS — E11.9 TYPE 2 DIABETES MELLITUS WITHOUT COMPLICATION, WITHOUT LONG-TERM CURRENT USE OF INSULIN: ICD-10-CM

## 2021-03-23 ENCOUNTER — PATIENT OUTREACH (OUTPATIENT)
Dept: ADMINISTRATIVE | Facility: HOSPITAL | Age: 86
End: 2021-03-23

## 2023-06-30 NOTE — ANESTHESIA POSTPROCEDURE EVALUATION
Anesthesia Post Evaluation    Patient: Ric Guzmán    Procedure(s) Performed: Procedure(s) (LRB):  COLONOSCOPY (N/A)  EGD (ESOPHAGOGASTRODUODENOSCOPY) (N/A)    Final Anesthesia Type: MAC    Patient location during evaluation: GI PACU  Patient participation: Yes- Able to Participate  Level of consciousness: awake and alert, oriented and awake  Post-procedure vital signs: reviewed and stable  Pain management: adequate  Airway patency: patent    PONV status at discharge: No PONV  Anesthetic complications: no      Cardiovascular status: blood pressure returned to baseline, hemodynamically stable and stable  Respiratory status: unassisted, spontaneous ventilation and room air  Hydration status: euvolemic  Follow-up not needed.  Comments: The patient states that he was comfortable for the procedure and is without recall of the procedure.          Vitals Value Taken Time   /80 3/2/2020 10:24 AM   Temp 36.8 °C (98.2 °F) 3/2/2020 10:20 AM   Pulse 64 3/2/2020 10:20 AM   Resp 18 3/2/2020 10:20 AM   SpO2 98 % 3/2/2020 10:20 AM         No case tracking events are documented in the log.      Pain/Alysa Score: Alysa Score: 10 (3/2/2020 10:24 AM)         Procedure:  EGD    Relevant Problems   ANESTHESIA (within normal limits)      CARDIO (within normal limits)      ENDO   (+) Acquired hypothyroidism      GI/HEPATIC (within normal limits)      /RENAL (within normal limits)      GYN (within normal limits)      HEMATOLOGY   (+) Iron deficiency anemia      MUSCULOSKELETAL   (+) Mid back pain   (+) Myofascial pain syndrome      NEURO/PSYCH   (+) Headache   (+) Myofascial pain syndrome   (+) Numbness and tingling in left hand   (+) Numbness and tingling in right hand      PULMONARY   (+) Obstructive sleep apnea (adult) (pediatric)      Cardiovascular and Mediastinum   (+) Pericardial cyst      Digestive   (+) Acute superficial gastritis without hemorrhage      Endocrine   (+) Pituitary microadenoma (HCC)      Nervous and Auditory   (+) Cerebellar tonsillar ectopia (HCC)   (+) Chiari I malformation (HCC)      Other   (+) History of Chiari malformation   (+) History of gastric bypass   (+) Lupus erythematosus        Physical Exam    Airway    Mallampati score: II  TM Distance: >3 FB  Neck ROM: full     Dental   No notable dental hx     Cardiovascular  Cardiovascular exam normal    Pulmonary  Pulmonary exam normal Breath sounds clear to auscultation,     Other Findings        Anesthesia Plan  ASA Score- 3     Anesthesia Type- IV sedation with anesthesia with ASA Monitors  Additional Monitors:   Airway Plan:           Plan Factors-Exercise tolerance (METS): >4 METS  Chart reviewed  EKG reviewed  Imaging results reviewed  Existing labs reviewed  Patient summary reviewed  Induction- intravenous  Postoperative Plan-     Informed Consent- Anesthetic plan and risks discussed with patient  I personally reviewed this patient with the CRNA  Discussed and agreed on the Anesthesia Plan with the CRNA  Chadwick Colmenares

## (undated) DEVICE — NDL SAFETY 25G X 1.5 ECLIPSE

## (undated) DEVICE — STRAP OR TABLE 5IN X 72IN

## (undated) DEVICE — KIT PROBE COOLIEF RF 17G 75MM

## (undated) DEVICE — GLOVE PROTEXIS PI CLASSIC 7.5

## (undated) DEVICE — BANDAGE ADHESIVE

## (undated) DEVICE — APPLICATOR CHLORAPREP ORN 26ML

## (undated) DEVICE — SEE MEDLINE ITEM 146292

## (undated) DEVICE — SEE MEDLINE ITEM 157131

## (undated) DEVICE — SYS LABEL CORRECT MED

## (undated) DEVICE — SYR 10CC LUER LOCK

## (undated) DEVICE — APPLICATOR CHLORAPREP CLR 10.5

## (undated) DEVICE — PAD GROUNDING DISPER ELECTRODE

## (undated) DEVICE — NDL HYPODERMIC BLUNT 18G 1.5IN

## (undated) DEVICE — SYR DISP LL 5CC

## (undated) DEVICE — NDL FLTR 5MCRN BLNT TIP 18GX1

## (undated) DEVICE — PAD ELECTROSURGICAL PAT PLATE

## (undated) DEVICE — GLOVE SURG ULTRA TOUCH 7.5

## (undated) DEVICE — KIT COOLED RF 75MM SGL PROBE

## (undated) DEVICE — NDL SPINAL SPINOCAN 22GX3.5

## (undated) DEVICE — CANNULA CVD 100MM X 20G

## (undated) DEVICE — TUBING MINIBORE EXTENSION

## (undated) DEVICE — NDL TUOHY EPIDURAL 20G X 3.5

## (undated) DEVICE — SPONGE SUPER KERLIX 6X6.75IN

## (undated) DEVICE — SHEET DRAPE MEDIUM

## (undated) DEVICE — SEE MEDLINE ITEM 152680